# Patient Record
Sex: FEMALE | Race: WHITE | NOT HISPANIC OR LATINO | Employment: FULL TIME | URBAN - METROPOLITAN AREA
[De-identification: names, ages, dates, MRNs, and addresses within clinical notes are randomized per-mention and may not be internally consistent; named-entity substitution may affect disease eponyms.]

---

## 2018-02-14 ENCOUNTER — OFFICE VISIT (OUTPATIENT)
Dept: URGENT CARE | Facility: RETAIL CLINIC | Age: 48
End: 2018-02-14
Payer: COMMERCIAL

## 2018-02-14 VITALS
DIASTOLIC BLOOD PRESSURE: 86 MMHG | TEMPERATURE: 99.2 F | HEART RATE: 75 BPM | OXYGEN SATURATION: 100 % | SYSTOLIC BLOOD PRESSURE: 136 MMHG

## 2018-02-14 DIAGNOSIS — J01.90 ACUTE SINUSITIS WITH COEXISTING CONDITION REQUIRING PROPHYLACTIC TREATMENT: Primary | ICD-10-CM

## 2018-02-14 DIAGNOSIS — R05.9 COUGH: ICD-10-CM

## 2018-02-14 DIAGNOSIS — R09.81 NASAL SINUS CONGESTION: ICD-10-CM

## 2018-02-14 DIAGNOSIS — R43.8 BAD TASTE IN MOUTH: ICD-10-CM

## 2018-02-14 LAB
FLUAV AG UPPER RESP QL IA.RAPID: NORMAL
FLUBV AG UPPER RESP QL IA.RAPID: NORMAL

## 2018-02-14 PROCEDURE — 99203 OFFICE O/P NEW LOW 30 MIN: CPT | Performed by: PHYSICIAN ASSISTANT

## 2018-02-14 PROCEDURE — 87804 INFLUENZA ASSAY W/OPTIC: CPT | Mod: QW | Performed by: PHYSICIAN ASSISTANT

## 2018-02-14 RX ORDER — FLUTICASONE PROPIONATE 50 MCG
1-2 SPRAY, SUSPENSION (ML) NASAL DAILY
Qty: 1 BOTTLE | Refills: 0 | Status: ON HOLD | OUTPATIENT
Start: 2018-02-14 | End: 2019-10-22

## 2018-02-14 NOTE — LETTER
[address] [address]        2/14/2018    Zuleyma Amor was seen 2/14/2018 at [facility]. Please excuse Zuleyma from work yesterday and tomorrow due to illness. Zuleyma may return to work when no fever x 1 day and feeling better.      Cordially,        Fransisca Guevara, PAC

## 2018-02-14 NOTE — MR AVS SNAPSHOT
"              After Visit Summary   2018    Zuleyma Reed    MRN: [id number]           Patient Information     Date Of Birth          1970        Visit Information        Provider Department      2018 6:30 PM Fransisca Guevara PA-C [facility]        Today's Diagnoses     Cough    -  1    Nasal sinus congestion        Acute sinusitis with coexisting condition requiring prophylactic treatment           Follow-ups after your visit        Who to contact     You can reach your care team any time of the day by calling [phone].  Notification of test results:  If you have an abnormal lab result, we will notify you by phone as soon as possible.         Additional Information About Your Visit        MyChart Information     [employer] lets you send messages to your doctor, view your test results, renew your prescriptions, schedule appointments and more. To sign up, go to [email or web address] . Click on \"Log in\" on the left side of the screen, which will take you to the Welcome page. Then click on \"Sign up Now\" on the right side of the page.     You will be asked to enter the access code listed below, as well as some personal information. Please follow the directions to create your username and password.     Your access code is: [email or web address]  Expires: 5/15/2018  7:24 PM     Your access code will  in 90 days. If you need help or a new code, please call your [facility] clinic or [phone].        Care EveryWhere ID     This is your [facility] EveryWhere ID. This could be used by other organizations to access your [facility] medical records  [id number]        Your Vitals Were     Pulse Temperature Pulse Oximetry             75 99.2  F (37.3  C) (Oral) 100%          Blood Pressure from Last 3 Encounters:   18 136/86   06 122/70    Weight from Last 3 Encounters:   06 178 lb 1.6 oz (80.8 kg)              We Performed the Following     INFLUENZA A/B ANTIGEN        "   Today's Medication Changes          These changes are accurate as of 2/14/18  7:24 PM.  If you have any questions, ask your nurse or doctor.               Start taking these medicines.        Dose/Directions    amoxicillin-clavulanate 875-125 MG per tablet   Commonly known as:  AUGMENTIN   Used for:  Acute sinusitis with coexisting condition requiring prophylactic treatment        Dose:  1 tablet   Take 1 tablet by mouth 2 times daily for 10 days   Quantity:  20 tablet   Refills:  0       fluticasone 50 MCG/ACT spray   Commonly known as:  FLONASE   Used for:  Nasal sinus congestion        Dose:  1-2 spray   Spray 1-2 sprays into both nostrils daily   Quantity:  1 Bottle   Refills:  0            Where to get your medicines      These medications were sent to [address]  [address] [facility] [address]     Phone:  [phone]     amoxicillin-clavulanate 875-125 MG per tablet    fluticasone 50 MCG/ACT spray                Primary Care Provider Office Phone # Fax #    Speedy Quevedo [phone]       [facility] [address] [facility] [address]        Equal Access to Services     [facility]: Hadii mary jama hadasho Somichael, waaxda luqadaha, qaybta kaalmada glynn, seema kraft . So [facility] [phone].    ATENCIÓN: Si habla español, tiene a jeff disposición servicios gratuitos de asistencia lingüística. [facility] [phone].    We comply with applicable federal civil rights laws and [place] laws. We do not discriminate on the basis of race, color, national origin, age, disability, sex, sexual orientation, or gender identity.            Thank you!     Thank you for choosing [facility]  for your care. Our goal is always to provide you with excellent care. Hearing back from our patients is one way we can continue to improve our services. Please take a few minutes to complete the written survey that  you may receive in the mail after your visit with us. Thank you!             Your Updated Medication List - Protect others around you: Learn how to safely use, store and throw away your medicines at www.disposemymeds.org.          This list is accurate as of 2/14/18  7:24 PM.  Always use your most recent med list.                   Brand Name Dispense Instructions for use Diagnosis    amoxicillin-clavulanate 875-125 MG per tablet    AUGMENTIN    20 tablet    Take 1 tablet by mouth 2 times daily for 10 days    Acute sinusitis with coexisting condition requiring prophylactic treatment       fluticasone 50 MCG/ACT spray    FLONASE    1 Bottle    Spray 1-2 sprays into both nostrils daily    Nasal sinus congestion       SINUS & COLD-D PO

## 2018-02-15 NOTE — PROGRESS NOTES
Chief Complaint   Patient presents with     Sinus Problem     x2- 3 days     Fever     Headache     Dizziness         SUBJECTIVE:   Pt. presenting to [facility] -  with a chief complaint of funny taste in mouth. Sinus HA and pressure.  Some purulent nasal discharge.  Temp today - low grade. No cough.    See CC.  Hx of asthma no.  Onset of symptoms sudden  Course of illness is worsening.    Severity moderate  Current and Associated symptoms: fever, runny nose, stuffy nose, facial pain/pressure, headache, body aches and fatigue  Treatment measures tried include Tylenol/Ibuprofen and Decongestants.  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  Last antibiotic none > 6 years    Pregnancy Mirena IUD  Smoker no    Influenza A and B  -neg    ROS:  Energy level is <  ENT - denies ear pain, throat pain.    CP - Occ dry cough. No SOB or chest pain   GI- - appetite <. No nausea, vomiting or diarrhea.   No bowel or bladder changes   MSK - no joint pain or swelling   Skin: No rashes  No past medical history on file.  No past surgical history on file.  There is no problem list on file for this patient.    Current Outpatient Prescriptions   Medication     Pseudoephedrine-Naproxen Na (SINUS & COLD-D PO)     No current facility-administered medications for this visit.        OBJECTIVE:  /86  Pulse 75  Temp 99.2  F (37.3  C) (Oral)  SpO2 100%    GENERAL APPEARANCE: cooperative, alert and no distress. Appears well hydrated.  EYES: conjunctiva clear  HENT: Rt ear canal  clear and TM normal   Lt ear canal clear and TM normal   Nose marked congestion. thick discharge  Mouth without ulcers or lesions. no erythema. no exudate. Some PND  NECK: supple, few small shoddy NT ant nodes. No  posterior nodes.  RESP: lungs clear to auscultation - no rales, rhonchi or wheezes. Breathing easily.  CV: regular rates and rhythm  ABDOMEN:  soft, nontender, no HSM or masses and bowel sounds normal   SKIN: no suspicious lesions  or rashes  mod tenderness to palpate over tre max sinus areas.    Influenza A and B neg    ASSESSMENT:     Cough  Nasal sinus congestion  Acute sinusitis with coexisting condition requiring prophylactic treatment  Bad taste in mouth          PLAN:  Symptomatic measures   Prescriptions as below. Discussed indications, dosing, side affects and adverse reactions of medications with  Patient -Augmentin and Flonase.   Afrin x 3 days only  Eat yogurt daily or take a probiotic supplement when on antibiotics.  OTC cough suppressant/expectorant discussed  Salt water gargles - throat lozenges or honey/lemon tea if soothing and has a ST  saline nasal spray for  nasal congestion   Cool mist vaporizer   Stay in clean air environment.  > rest.  > fluids.  Contagiousness and hygiene discussed.  Fever and pain  control measures discussed.   If unable to swallow or any breathing difficulty to go to ED   See letter for work    Pt is comfortable with this plan.  Electronically signed,  ANTONIA Guevara, PAC

## 2018-02-15 NOTE — NURSING NOTE
"Chief Complaint   Patient presents with     Sinus Problem     x2- 3 days     Fever     Headache     Dizziness       Initial /86  Pulse 75  Temp 99.2  F (37.3  C) (Oral)  SpO2 100% Estimated body mass index is 26.3 kg/(m^2) as calculated from the following:    Height as of 5/17/06: 5' 9\" (1.753 m).    Weight as of 5/17/06: 178 lb 1.6 oz (80.8 kg).  Medication Reconciliation: complete   Zahida Dan      "

## 2018-03-08 ENCOUNTER — HOSPITAL ENCOUNTER (EMERGENCY)
Facility: CLINIC | Age: 48
Discharge: HOME | End: 2018-03-08
Attending: EMERGENCY MEDICINE | Admitting: EMERGENCY MEDICINE

## 2018-03-08 ENCOUNTER — APPOINTMENT (OUTPATIENT)
Dept: GENERAL RADIOLOGY | Facility: CLINIC | Age: 48
End: 2018-03-08
Attending: EMERGENCY MEDICINE

## 2018-03-08 VITALS
HEART RATE: 88 BPM | DIASTOLIC BLOOD PRESSURE: 80 MMHG | BODY MASS INDEX: 24.25 KG/M2 | RESPIRATION RATE: 20 BRPM | SYSTOLIC BLOOD PRESSURE: 158 MMHG | TEMPERATURE: 98.6 F | WEIGHT: 160 LBS | HEIGHT: 68 IN | OXYGEN SATURATION: 100 %

## 2018-03-08 DIAGNOSIS — S63.502A WRIST SPRAIN, LEFT, INITIAL ENCOUNTER: ICD-10-CM

## 2018-03-08 PROCEDURE — 73110 X-RAY EXAM OF WRIST: CPT | Mod: LT

## 2018-03-08 PROCEDURE — 99284 EMERGENCY DEPT VISIT MOD MDM: CPT

## 2018-03-08 PROCEDURE — 29125 APPL SHORT ARM SPLINT STATIC: CPT | Mod: LT

## 2018-03-08 NOTE — ED AVS SNAPSHOT
Emergency Department    [address] [facility] [address]    Phone:  [phone]    Fax:  [phone]                                       Zuleyma Reed   MRN: [id number]    Department:   Emergency Department   Date of Visit:  3/8/2018           Patient Information     Date Of Birth          1970        Your diagnoses for this visit were:     Wrist sprain, left, initial encounter        You were seen by Shar Verma MD.      Follow-up Information     Follow up with Shanti Alcantar MD In 1 week.    Specialty:  Orthopedics    Why:  recheck ed, If symptoms worsen    Contact information:    [facility]  [address] [id number] [phone]        Discharge References/Attachments     WRIST SPRAIN (ENGLISH)      24 Hour Appointment Hotline       To make an appointment at any [facility], call [phone] ([phone]). If you don't have a family doctor or clinic, we will help you find one. [place] clinics are conveniently located to serve the needs of you and your family.             Review of your medicines      Our records show that you are taking the medicines listed below. If these are incorrect, please call your family doctor or clinic.        Dose / Directions Last dose taken    fluticasone 50 MCG/ACT spray   Commonly known as:  FLONASE   Dose:  1-2 spray   Quantity:  1 Bottle        Spray 1-2 sprays into both nostrils daily   Refills:  0        SINUS & COLD-D PO        Refills:  0                Procedures and tests performed during your visit     Wrist XR, G/E 3 views, left      Orders Needing Specimen Collection     None      Pending Results     No orders found from 3/6/2018 to 3/9/2018.            Pending Culture Results     No orders found from 3/6/2018 to 3/9/2018.            Pending Results Instructions     If you had any lab results that were not finalized at the time of your Discharge, you can call the ED Lab Result RN at  [phone]. You will be contacted by this team for any positive Lab results or changes in treatment. The nurses are available 7 days a week from 10A to 6:30P.  You can leave a message 24 hours per day and they will return your call.        Test Results From Your Hospital Stay        3/8/2018  3:28 PM      Narrative     XR WRIST LEFT G/E 3 VIEWS 3/8/2018 3:18 PM    COMPARISON: None.    HISTORY: Injury.        Impression     IMPRESSION: No fractures are seen in the left wrist. Joints are  preserved and in normal alignment.    RANDI WONG MD                Clinical Quality Measure: Blood Pressure Screening     Your blood pressure was checked while you were in the emergency department today. The last reading we obtained was  BP: 158/80 . Please read the guidelines below about what these numbers mean and what you should do about them.  If your systolic blood pressure (the top number) is less than 120 and your diastolic blood pressure (the bottom number) is less than 80, then your blood pressure is normal. There is nothing more that you need to do about it.  If your systolic blood pressure (the top number) is 120-139 or your diastolic blood pressure (the bottom number) is 80-89, your blood pressure may be higher than it should be. You should have your blood pressure rechecked within a year by a primary care provider.  If your systolic blood pressure (the top number) is 140 or greater or your diastolic blood pressure (the bottom number) is 90 or greater, you may have high blood pressure. High blood pressure is treatable, but if left untreated over time it can put you at risk for heart attack, stroke, or kidney failure. You should have your blood pressure rechecked by a primary care provider within the next 4 weeks.  If your provider in the emergency department today gave you specific instructions to follow-up with your doctor or provider even sooner than that, you should follow that instruction and not wait for up to 4  "weeks for your follow-up visit.        Thank you for choosing [facility]       Thank you for choosing [facility] for your care. Our goal is always to provide you with excellent care. Hearing back from our patients is one way we can continue to improve our services. Please take a few minutes to complete the written survey that you may receive in the mail after you visit with us. Thank you!        [employer] Information     [employer] lets you send messages to your doctor, view your test results, renew your prescriptions, schedule appointments and more. To sign up, go to [email or web address] . Click on \"Log in\" on the left side of the screen, which will take you to the Welcome page. Then click on \"Sign up Now\" on the right side of the page.     You will be asked to enter the access code listed below, as well as some personal information. Please follow the directions to create your username and password.     Your access code is: [email or web address]  Expires: 5/15/2018  7:24 PM     Your access code will  in 90 days. If you need help or a new code, please call your [facility] clinic or [phone].        Care EveryWhere ID     This is your Care EveryWhere ID. This could be used by other organizations to access your [facility] medical records  [id number]        Equal Access to Services     STACEY MARTINEZ : Garrett Hickey, waaxda luqadaha, qaybta kaalmada aderuth annyaneil, seema alvarez. So [facility] [phone].    ATENCIÓN: Si habla español, tiene a jeff disposición servicios gratuitos de asistencia lingüística. Llame al [phone].    We comply with applicable federal civil rights laws and [place] laws. We do not discriminate on the basis of race, color, national origin, age, disability, sex, sexual orientation, or gender identity.            After Visit Summary       This is your record. Keep this with you and show to your community pharmacist(s) and doctor(s) at your next visit.                "

## 2018-03-08 NOTE — ED AVS SNAPSHOT
Emergency Department    [address] [place] [address]    Phone:  [phone]    Fax:  [phone]                                       Zuleyma Reed   MRN: [id number]    Department:   Emergency Department   Date of Visit:  3/8/2018           After Visit Summary Signature Page     I have received my discharge instructions, and my questions have been answered. I have discussed any challenges I see with this plan with the nurse or doctor.    ..........................................................................................................................................  Patient/Patient Representative Signature      ..........................................................................................................................................  Patient Representative Print Name and Relationship to Patient    ..................................................               ................................................  Date                                            Time    ..........................................................................................................................................  Reviewed by Signature/Title    ...................................................              ..............................................  Date                                                            Time
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
## 2018-05-11 ENCOUNTER — HOSPITAL ENCOUNTER (EMERGENCY)
Facility: CLINIC | Age: 48
Discharge: HOME | End: 2018-05-11
Attending: EMERGENCY MEDICINE | Admitting: EMERGENCY MEDICINE
Payer: COMMERCIAL

## 2018-05-11 ENCOUNTER — APPOINTMENT (OUTPATIENT)
Dept: GENERAL RADIOLOGY | Facility: CLINIC | Age: 48
End: 2018-05-11
Attending: EMERGENCY MEDICINE
Payer: COMMERCIAL

## 2018-05-11 VITALS
RESPIRATION RATE: 16 BRPM | TEMPERATURE: 98.6 F | OXYGEN SATURATION: 97 % | DIASTOLIC BLOOD PRESSURE: 99 MMHG | SYSTOLIC BLOOD PRESSURE: 153 MMHG | BODY MASS INDEX: 25.76 KG/M2 | WEIGHT: 170 LBS | HEIGHT: 68 IN

## 2018-05-11 DIAGNOSIS — R05.9 COUGH: ICD-10-CM

## 2018-05-11 DIAGNOSIS — M79.10 MYALGIA: ICD-10-CM

## 2018-05-11 DIAGNOSIS — J06.9 VIRAL UPPER RESPIRATORY TRACT INFECTION: ICD-10-CM

## 2018-05-11 DIAGNOSIS — R09.81 NASAL CONGESTION: ICD-10-CM

## 2018-05-11 DIAGNOSIS — J01.01 ACUTE RECURRENT MAXILLARY SINUSITIS: ICD-10-CM

## 2018-05-11 LAB
ANION GAP SERPL CALCULATED.3IONS-SCNC: 7 MMOL/L (ref 3–14)
BASOPHILS # BLD AUTO: 0.1 10E9/L (ref 0–0.2)
BASOPHILS NFR BLD AUTO: 1.8 %
BUN SERPL-MCNC: 7 MG/DL (ref 7–30)
CALCIUM SERPL-MCNC: 8.6 MG/DL (ref 8.5–10.1)
CHLORIDE SERPL-SCNC: 109 MMOL/L (ref 94–109)
CO2 SERPL-SCNC: 25 MMOL/L (ref 20–32)
CREAT SERPL-MCNC: 0.85 MG/DL (ref 0.52–1.04)
DIFFERENTIAL METHOD BLD: NORMAL
EOSINOPHIL # BLD AUTO: 0.2 10E9/L (ref 0–0.7)
EOSINOPHIL NFR BLD AUTO: 4 %
ERYTHROCYTE [DISTWIDTH] IN BLOOD BY AUTOMATED COUNT: 12.6 % (ref 10–15)
GFR SERPL CREATININE-BSD FRML MDRD: 72 ML/MIN/1.7M2
GLUCOSE SERPL-MCNC: 90 MG/DL (ref 70–99)
HCT VFR BLD AUTO: 42.9 % (ref 35–47)
HGB BLD-MCNC: 14.7 G/DL (ref 11.7–15.7)
IMM GRANULOCYTES # BLD: 0 10E9/L (ref 0–0.4)
IMM GRANULOCYTES NFR BLD: 0.4 %
LYMPHOCYTES # BLD AUTO: 2.2 10E9/L (ref 0.8–5.3)
LYMPHOCYTES NFR BLD AUTO: 38.8 %
MCH RBC QN AUTO: 30.7 PG (ref 26.5–33)
MCHC RBC AUTO-ENTMCNC: 34.3 G/DL (ref 31.5–36.5)
MCV RBC AUTO: 90 FL (ref 78–100)
MONOCYTES # BLD AUTO: 0.8 10E9/L (ref 0–1.3)
MONOCYTES NFR BLD AUTO: 15.2 %
NEUTROPHILS # BLD AUTO: 2.2 10E9/L (ref 1.6–8.3)
NEUTROPHILS NFR BLD AUTO: 39.8 %
NRBC # BLD AUTO: 0 10*3/UL
NRBC BLD AUTO-RTO: 0 /100
PLATELET # BLD AUTO: 213 10E9/L (ref 150–450)
POTASSIUM SERPL-SCNC: 3.7 MMOL/L (ref 3.4–5.3)
RBC # BLD AUTO: 4.79 10E12/L (ref 3.8–5.2)
SODIUM SERPL-SCNC: 141 MMOL/L (ref 133–144)
WBC # BLD AUTO: 5.5 10E9/L (ref 4–11)

## 2018-05-11 PROCEDURE — 25000125 ZZHC RX 250: Performed by: EMERGENCY MEDICINE

## 2018-05-11 PROCEDURE — 99284 EMERGENCY DEPT VISIT MOD MDM: CPT | Mod: 25

## 2018-05-11 PROCEDURE — 96361 HYDRATE IV INFUSION ADD-ON: CPT

## 2018-05-11 PROCEDURE — 96374 THER/PROPH/DIAG INJ IV PUSH: CPT

## 2018-05-11 PROCEDURE — 85025 COMPLETE CBC W/AUTO DIFF WBC: CPT | Performed by: EMERGENCY MEDICINE

## 2018-05-11 PROCEDURE — 71046 X-RAY EXAM CHEST 2 VIEWS: CPT

## 2018-05-11 PROCEDURE — 80048 BASIC METABOLIC PNL TOTAL CA: CPT | Performed by: EMERGENCY MEDICINE

## 2018-05-11 PROCEDURE — 25000128 H RX IP 250 OP 636: Performed by: EMERGENCY MEDICINE

## 2018-05-11 RX ORDER — OXYMETAZOLINE HYDROCHLORIDE 0.05 G/100ML
2 SPRAY NASAL ONCE
Status: COMPLETED | OUTPATIENT
Start: 2018-05-11 | End: 2018-05-11

## 2018-05-11 RX ORDER — SODIUM CHLORIDE 9 MG/ML
1000 INJECTION, SOLUTION INTRAVENOUS CONTINUOUS
Status: DISCONTINUED | OUTPATIENT
Start: 2018-05-11 | End: 2018-05-11 | Stop reason: HOSPADM

## 2018-05-11 RX ORDER — KETOROLAC TROMETHAMINE 15 MG/ML
15 INJECTION, SOLUTION INTRAMUSCULAR; INTRAVENOUS ONCE
Status: COMPLETED | OUTPATIENT
Start: 2018-05-11 | End: 2018-05-11

## 2018-05-11 RX ADMIN — SODIUM CHLORIDE 1000 ML: 9 INJECTION, SOLUTION INTRAVENOUS at 02:54

## 2018-05-11 RX ADMIN — KETOROLAC TROMETHAMINE 15 MG: 15 INJECTION, SOLUTION INTRAMUSCULAR; INTRAVENOUS at 02:57

## 2018-05-11 RX ADMIN — OXYMETAZOLINE HYDROCHLORIDE 2 SPRAY: 5 SPRAY NASAL at 02:59

## 2018-05-11 ASSESSMENT — ENCOUNTER SYMPTOMS
COUGH: 1
DIARRHEA: 0
SORE THROAT: 1
DYSURIA: 0
HEMATURIA: 0
FREQUENCY: 0
FEVER: 1
VOMITING: 0

## 2018-05-11 NOTE — ED NOTES
Bed: ED20  Expected date:   Expected time:   Means of arrival:   Comments:  Hold for a patient coming

## 2018-05-11 NOTE — DISCHARGE INSTRUCTIONS
Please return to the ED if you have worsening cough, fevers >101, chest pain, shortness of breath, intractable vomiting, or other acute changes.  Please follow up with your PCP in the next 2-3 days.      Use tylenol and ibuprofen for pain.  Drink plenty of fluids and rest.      Discharge Instructions  Upper Respiratory Infection    The upper respiratory tract includes the sinuses, nasal passages, pharynx, and larynx. A URI, or upper respiratory infection, is an infection of any of the parts of the upper airway. Symptoms include runny nose, congestion, sneezing, sore throat, cough, and fever. URIs are almost always caused by a virus. Antibiotics do not help with viral infections, so are generally not prescribed. A URI is very contagious through coughing and nasal secretions; make sure you wash your hands often and clean surfaces after sneezing, coughing or touching them. While you should start to improve in 3 - 5 days, remember that sometimes a cough can linger for several weeks.    Generally, every Emergency Department visit should have a follow-up clinic visit with either a primary or a specialty clinic/provider. Please follow-up as instructed by your emergency provider today.    Return to the Emergency Department if:    Any of your symptoms get much worse.    You seem very sick, like being too weak to get up.    You have chest pain or shortness of breath.     You have a severe headache.    You are vomiting (throwing up) so much you cannot keep fluids or medicines down.    You have confusion or seem unusually drowsy.    You have a seizure.    What can I do to help myself?    Fill any prescriptions the provider gave you and take them right away    If you have a fever, get plenty of rest and drink lots of fluids, especially water.    Using a humidifier or saline nose spray will also help loosen mucous.     Clothes or blankets will not change your fever. Do what is comfortable for you.    Bathing or sponging in  lukewarm water may help you feel better.    Acetaminophen (Tylenol ) or ibuprofen (Advil , Motrin ) will help bring fever down and may help you feel more comfortable. Be sure to read and follow the package directions, and ask your provider if you have questions.    Do not drink alcohol.    Decongestants may help you feel better. You may use decongestant nose sprays Afrin  (oxymetazoline) or Waqar-Synephrine  (phenylephrine hydrochloride) for up to 3 days, or may use a decongestant tablet like Sudafed  (pseudoephedrine).  If you were given a prescription for medicine here today, be sure to read all of the information (including the package insert) that comes with your prescription.  This will include important information about the medicine, its side effects, and any warnings that you need to know about.  The pharmacist who fills the prescription can provide more information and answer questions you may have about the medicine.  If you have questions or concerns that the pharmacist cannot address, please call or return to the Emergency Department.   Remember that you can always come back to the Emergency Department if you are not able to see your regular provider in the amount of time listed above, if you get any new symptoms, or if there is anything that worries you.

## 2018-05-11 NOTE — LETTER
May 11, 2018      To Whom It May Concern:      Zuleyma Reed was seen in our Emergency Department today, 05/11/18.  I expect her condition to improve over the next 3 days.  She may return to work/school when improved.    Sincerely,        Julio HOWARD RN

## 2018-05-11 NOTE — ED PROVIDER NOTES
"  History     Chief Complaint:  Cough    HPI   Zuleyma Reed is a 47 year old female who presents to the ED for evaluation of a cough. The patient reports that she has had a wet cough with congestion and a sore throat for the last 4 days. Her symptoms have progressively worsened, so she presented to the ED tonight for evaluation. She additionally notes having some associated fevers and feeling dehydrated. She has some chest \"burning\" with her cough. She denies any vomiting, diarrhea, ill contacts, or urinary symptoms. She has been taking Tylenol Cold & Flu for her symptoms, though has not used Afrin.    Allergies:  NKDA    Medications:    Flonase    Past Medical History:    No past pertinent family history.    Past Surgical History:    The patient does not have any pertinent past surgical history.    Family History:    No past pertinent family history.    Social History:  Marital Status:  Single [1]  Former smoker  Alcohol use: yes    Review of Systems   Constitutional: Positive for fever.   HENT: Positive for congestion and sore throat.    Respiratory: Positive for cough.    Gastrointestinal: Negative for diarrhea and vomiting.   Genitourinary: Negative for dysuria, frequency and hematuria.   All other systems reviewed and are negative.    Physical Exam     Patient Vitals for the past 24 hrs:   BP Temp Temp src Heart Rate Resp SpO2 Height Weight   05/11/18 0300 - - - - - 97 % - -   05/11/18 0245 - - - - - 97 % - -   05/11/18 0215 (!) 153/99 98.6  F (37  C) Oral 78 16 98 % 1.727 m (5' 8\") 77.1 kg (170 lb)     Physical Exam   General: Resting on the bed.  Ears, Nose, Throat:  External ears normal.  Nose normal.  No pharyngeal erythema, swelling or exudate.  Midline uvula.  no trismus.    Eyes:  Conjunctivae clear.  Pupils are equal, round, and reactive.   Neck: Normal range of motion.  Neck supple. minimal right submandibular LAD.    CV: Regular rate and rhythm.  No murmurs.      Respiratory: Effort normal and " breath sounds normal.  No wheezing or crackles.   Gastrointestinal: Soft.  No distension. There is no tenderness.    Neuro: Alert. Moving all extremities appropriately.  Normal speech.    Skin: Skin is warm and dry.  No rash noted.   Emergency Department Course     Imaging:  Radiographic findings were communicated with the patient who voiced understanding of the findings.  XR Chest 2 views:   No acute abnormality, as per radiology.     Laboratory:  CBC: WBC: 5.5, HGB: 14.7, PLT: 213  BMP: All WNL (Creatinine: 0.85)    Interventions:  0254 NS 1L IV  0257 Toradol, 15 mg, IV injection  0259 Afrin 2 sprays Nasal  Please see MAR for full list of medications administered in the ED.    Emergency Department Course:  Nursing notes and vitals reviewed. (0240) I performed an exam of the patient as documented above.     IV inserted. Medicine administered as documented above. Blood drawn. This was sent to the lab for further testing, results above.    The patient was sent for a Chest XR while in the emergency department, findings above.     (0340) I rechecked the patient and discussed the results of her workup thus far.     Findings and plan explained to the Patient. Patient discharged home with instructions regarding supportive care, medications, and reasons to return. The importance of close follow-up was reviewed. The patient was prescribed Augmentin.    I personally reviewed the laboratory results with the Patient and answered all related questions prior to discharge.     Impression & Plan      Medical Decision Making:  Zuleyma Reed is a 47 year old female, otherwise healthy, who presents with a cough, congestion, and generalized malaise. Vitals are unremarkable. Broad differential pursued including, but not limited to, URI, pneumonia, sinus infection, electrolyte or metabolic abnormality, etc. Overall she is well appearing and non toxic. She denies any urinary symptoms to suggest UTI. She has a benign abdominal exam. I  am not concerned for any acute surgical process. CBC shows no leukocytosis or anemia. BMP shows no metabolic, electrolyte, or renal abnormality. Chest xray was obtained and there is no evidence for any pneumonia, pneumothorax, or effusion. She is otherwise non toxic. Not concerning for meningitis or encephalitis. He was given Toradol and IV fluid with improvement in symptoms. I suggest she likely has a viral URI.  On reassessment the patient she feels improved but reports that she has had this on and off nasal congestion for 2-3 weeks.  She reports that she has not had increased drainage that has been colored.  She had low-grade temperatures at home.  Given the persistence of symptoms greater than 10-14 days opted to treat with Augmentin.  She reports this feels like her sinus infection.  Her sinus infections previously have responded well to Augmentin. I advised supportive cares including Afrin, saline, tylenol, and ibuprofen. I advised good fluid hydration. She was discharged in stable but improved condition. I advised follow up with her PCP and to return with any worsening symptoms.     Diagnosis:    ICD-10-CM   1. Nasal congestion R09.81   2. Cough R05   3. Myalgia M79.1   4. Viral upper respiratory tract infection J06.9    B97.89   5. Acute recurrent maxillary sinusitis J01.01     Disposition:  discharged to home    Discharge Medications:  New Prescriptions    AMOXICILLIN-CLAVULANATE (AUGMENTIN) 875-125 MG PER TABLET    Take 1 tablet by mouth 2 times daily for 7 days     Scribe Disclosure:  Chandni CARDONA, am serving as a scribe on 5/11/2018 at 2:20 AM to personally document services performed by Chandni Chong MD based on my observations and the provider's statements to me.     Chandni Leon  5/11/2018    EMERGENCY DEPARTMENT       Chandni Chong MD  05/11/18 0454

## 2018-05-11 NOTE — ED AVS SNAPSHOT
Emergency Department    [address] [place] [address]    Phone:  [phone]    Fax:  [phone]                                       Zuleyma Reed   MRN: [id number]    Department:   Emergency Department   Date of Visit:  5/11/2018           After Visit Summary Signature Page     I have received my discharge instructions, and my questions have been answered. I have discussed any challenges I see with this plan with the nurse or doctor.    ..........................................................................................................................................  Patient/Patient Representative Signature      ..........................................................................................................................................  Patient Representative Print Name and Relationship to Patient    ..................................................               ................................................  Date                                            Time    ..........................................................................................................................................  Reviewed by Signature/Title    ...................................................              ..............................................  Date                                                            Time

## 2018-05-11 NOTE — ED AVS SNAPSHOT
Emergency Department    [address] [facility] [address]    Phone:  [phone]    Fax:  [phone]                                       Zuleyma Reed   MRN: [id number]    Department:   Emergency Department   Date of Visit:  5/11/2018           Patient Information     Date Of Birth          1970        Your diagnoses for this visit were:     Nasal congestion     Cough     Myalgia     Viral upper respiratory tract infection     Acute recurrent maxillary sinusitis        You were seen by Chandni Chong MD.      Follow-up Information     Follow up with Jyotsna Ellis CNM. Schedule an appointment as soon as possible for a visit in 2 days.    Contact information:    [facility]  [address] [facility] [address]  [phone]          Discharge Instructions       Please return to the ED if you have worsening cough, fevers >101, chest pain, shortness of breath, intractable vomiting, or other acute changes.  Please follow up with your PCP in the next 2-3 days.      Use tylenol and ibuprofen for pain.  Drink plenty of fluids and rest.      Discharge Instructions  Upper Respiratory Infection    The upper respiratory tract includes the sinuses, nasal passages, pharynx, and larynx. A URI, or upper respiratory infection, is an infection of any of the parts of the upper airway. Symptoms include runny nose, congestion, sneezing, sore throat, cough, and fever. URIs are almost always caused by a virus. Antibiotics do not help with viral infections, so are generally not prescribed. A URI is very contagious through coughing and nasal secretions; make sure you wash your hands often and clean surfaces after sneezing, coughing or touching them. While you should start to improve in 3 - 5 days, remember that sometimes a cough can linger for several weeks.    Generally, every Emergency Department visit should have a follow-up clinic visit with either a primary or a specialty  clinic/provider. Please follow-up as instructed by your emergency provider today.    Return to the Emergency Department if:    Any of your symptoms get much worse.    You seem very sick, like being too weak to get up.    You have chest pain or shortness of breath.     You have a severe headache.    You are vomiting (throwing up) so much you cannot keep fluids or medicines down.    You have confusion or seem unusually drowsy.    You have a seizure.    What can I do to help myself?    Fill any prescriptions the provider gave you and take them right away    If you have a fever, get plenty of rest and drink lots of fluids, especially water.    Using a humidifier or saline nose spray will also help loosen mucous.     Clothes or blankets will not change your fever. Do what is comfortable for you.    Bathing or sponging in lukewarm water may help you feel better.    Acetaminophen (Tylenol ) or ibuprofen (Advil , Motrin ) will help bring fever down and may help you feel more comfortable. Be sure to read and follow the package directions, and ask your provider if you have questions.    Do not drink alcohol.    Decongestants may help you feel better. You may use decongestant nose sprays Afrin  (oxymetazoline) or Waqar-Synephrine  (phenylephrine hydrochloride) for up to 3 days, or may use a decongestant tablet like Sudafed  (pseudoephedrine).  If you were given a prescription for medicine here today, be sure to read all of the information (including the package insert) that comes with your prescription.  This will include important information about the medicine, its side effects, and any warnings that you need to know about.  The pharmacist who fills the prescription can provide more information and answer questions you may have about the medicine.  If you have questions or concerns that the pharmacist cannot address, please call or return to the Emergency Department.   Remember that you can always come back to the Emergency  Department if you are not able to see your regular provider in the amount of time listed above, if you get any new symptoms, or if there is anything that worries you.      24 Hour Appointment Hotline       To make an appointment at any [facility], call [phone] ([phone]). If you don't have a family doctor or clinic, we will help you find one. [facility] are conveniently located to serve the needs of you and your family.             Review of your medicines      START taking        Dose / Directions Last dose taken    amoxicillin-clavulanate 875-125 MG per tablet   Commonly known as:  AUGMENTIN   Dose:  1 tablet   Quantity:  14 tablet        Take 1 tablet by mouth 2 times daily for 7 days   Refills:  0          Our records show that you are taking the medicines listed below. If these are incorrect, please call your family doctor or clinic.        Dose / Directions Last dose taken    fluticasone 50 MCG/ACT spray   Commonly known as:  FLONASE   Dose:  1-2 spray   Quantity:  1 Bottle        Spray 1-2 sprays into both nostrils daily   Refills:  0        SINUS & COLD-D PO        Refills:  0                Prescriptions were sent or printed at these locations (1 Prescription)                   Other Prescriptions                Printed at Department/Unit printer (1 of 1)         amoxicillin-clavulanate (AUGMENTIN) 875-125 MG per tablet                Procedures and tests performed during your visit     Basic metabolic panel    CBC with platelets differential    XR Chest 2 Views      Orders Needing Specimen Collection     None      Pending Results     No orders found from 5/9/2018 to 5/12/2018.            Pending Culture Results     No orders found from 5/9/2018 to 5/12/2018.            Pending Results Instructions     If you had any lab results that were not finalized at the time of your Discharge, you can call the ED Lab Result RN at [phone]. You will be contacted by this team for any positive  Lab results or changes in treatment. The nurses are available 7 days a week from 10A to 6:30P.  You can leave a message 24 hours per day and they will return your call.        Test Results From Your Hospital Stay        5/11/2018  3:02 AM      Component Results     Component Value Ref Range & Units Status    WBC 5.5 4.0 - 11.0 10e9/L Final    RBC Count 4.79 3.8 - 5.2 10e12/L Final    Hemoglobin 14.7 11.7 - 15.7 g/dL Final    Hematocrit 42.9 35.0 - 47.0 % Final    MCV 90 78 - 100 fl Final    MCH 30.7 26.5 - 33.0 pg Final    MCHC 34.3 31.5 - 36.5 g/dL Final    RDW 12.6 10.0 - 15.0 % Final    Platelet Count 213 150 - 450 10e9/L Final    Diff Method Automated Method  Final    % Neutrophils 39.8 % Final    % Lymphocytes 38.8 % Final    % Monocytes 15.2 % Final    % Eosinophils 4.0 % Final    % Basophils 1.8 % Final    % Immature Granulocytes 0.4 % Final    Nucleated RBCs 0 0 /100 Final    Absolute Neutrophil 2.2 1.6 - 8.3 10e9/L Final    Absolute Lymphocytes 2.2 0.8 - 5.3 10e9/L Final    Absolute Monocytes 0.8 0.0 - 1.3 10e9/L Final    Absolute Eosinophils 0.2 0.0 - 0.7 10e9/L Final    Absolute Basophils 0.1 0.0 - 0.2 10e9/L Final    Abs Immature Granulocytes 0.0 0 - 0.4 10e9/L Final    Absolute Nucleated RBC 0.0  Final         5/11/2018  3:17 AM      Component Results     Component Value Ref Range & Units Status    Sodium 141 133 - 144 mmol/L Final    Potassium 3.7 3.4 - 5.3 mmol/L Final    Chloride 109 94 - 109 mmol/L Final    Carbon Dioxide 25 20 - 32 mmol/L Final    Anion Gap 7 3 - 14 mmol/L Final    Glucose 90 70 - 99 mg/dL Final    Urea Nitrogen 7 7 - 30 mg/dL Final    Creatinine 0.85 0.52 - 1.04 mg/dL Final    GFR Estimate 72 >60 mL/min/1.7m2 Final    Non  GFR Calc    GFR Estimate If Black 87 >60 mL/min/1.7m2 Final    African American GFR Calc    Calcium 8.6 8.5 - 10.1 mg/dL Final         5/11/2018  3:16 AM      Narrative     XR CHEST 2 VW   5/11/2018 3:13 AM     HISTORY: Cough.    COMPARISON:  5/5/2006.    FINDINGS: The heart size is normal. The lungs are clear. No  pneumothorax or pleural effusion.        Impression     IMPRESSION: No acute abnormality.    DANA IBRAHIM MD                Clinical Quality Measure: Blood Pressure Screening     Your blood pressure was checked while you were in the emergency department today. The last reading we obtained was  BP: (!) 153/99 . Please read the guidelines below about what these numbers mean and what you should do about them.  If your systolic blood pressure (the top number) is less than 120 and your diastolic blood pressure (the bottom number) is less than 80, then your blood pressure is normal. There is nothing more that you need to do about it.  If your systolic blood pressure (the top number) is 120-139 or your diastolic blood pressure (the bottom number) is 80-89, your blood pressure may be higher than it should be. You should have your blood pressure rechecked within a year by a primary care provider.  If your systolic blood pressure (the top number) is 140 or greater or your diastolic blood pressure (the bottom number) is 90 or greater, you may have high blood pressure. High blood pressure is treatable, but if left untreated over time it can put you at risk for heart attack, stroke, or kidney failure. You should have your blood pressure rechecked by a primary care provider within the next 4 weeks.  If your provider in the emergency department today gave you specific instructions to follow-up with your doctor or provider even sooner than that, you should follow that instruction and not wait for up to 4 weeks for your follow-up visit.        Thank you for choosing [facility]       Thank you for choosing [facility] for your care. Our goal is always to provide you with excellent care. Hearing back from our patients is one way we can continue to improve our services. Please take a few minutes to complete the written survey that you may receive in the mail after  "you visit with us. Thank you!        [employer] Information     [employer] lets you send messages to your doctor, view your test results, renew your prescriptions, schedule appointments and more. To sign up, go to [email or web address] . Click on \"Log in\" on the left side of the screen, which will take you to the Welcome page. Then click on \"Sign up Now\" on the right side of the page.     You will be asked to enter the access code listed below, as well as some personal information. Please follow the directions to create your username and password.     Your access code is: [email or web address]  Expires: 5/15/2018  8:24 PM     Your access code will  in 90 days. If you need help or a new code, please call your [facility] clinic or [phone].        Care EveryWhere ID     This is your Care EveryWhere ID. This could be used by other organizations to access your [facility] medical records  [id number]        Equal Access to Services     [facility] : Hadii mary jama hadasho Solyndsayali, waaxda luqadaha, qaybta kaalmada adeegyaneil, seema kraft . So [facility] [phone].    ATENCIÓN: Si habla español, tiene a jeff disposición servicios gratuitos de asistencia lingüística. Llame al [phone].    We comply with applicable federal civil rights laws and [place] laws. We do not discriminate on the basis of race, color, national origin, age, disability, sex, sexual orientation, or gender identity.            After Visit Summary       This is your record. Keep this with you and show to your community pharmacist(s) and doctor(s) at your next visit.                  "

## 2019-01-08 ENCOUNTER — VIRTUAL VISIT (OUTPATIENT)
Dept: FAMILY MEDICINE | Facility: OTHER | Age: 49
End: 2019-01-08

## 2019-01-08 NOTE — PROGRESS NOTES
"Date:   Clinician: Yoseph Zheng  Clinician NPI: [id number]  Patient: Zuleyma Reed  Patient : 1970  Patient Address: [address] [place] [address], [place] [address]  Patient Phone: [phone]  Visit Protocol: URI  Patient Summary:  Zuleyma is a 48 year old ( : 1970 ) female who initiated a Visit for cold, sinus infection, or influenza. When asked the question \"Please sign me up to receive news, health information and promotions from [employer].\", uZleyma responded \"No\".    Zuleyma states her symptoms started gradually 2-3 weeks ago. After her symptoms started, they improved and then got worse again.   Her symptoms consist of myalgia, a headache, malaise, facial pain or pressure, rhinitis, and nasal congestion. She is experiencing difficulty breathing due to nasal congestion but she is not short of breath.   Symptom details     Nasal secretions: The color of her mucus is yellow.    Facial pain or pressure: The facial pain or pressure does not feel worse when bending or leaning forward.     Headache: She states the headache is moderate (4-6 on a 10 point pain scale).      Zuleyma denies having enlarged lymph nodes, sore throat, ear pain, fever, cough, chills, wheezing, and teeth pain. She also denies taking antibiotic medication for the symptoms and having recent facial or sinus surgery in the past 60 days.   She has not recently been exposed to someone with influenza. Zuleyma has not been in close contact with any high risk individuals.   Zuleyma had 2 sinus infections within the past year.   Weight: 175 lbs   Zuleyma does not smoke or use smokeless tobacco.   She denies pregnancy and denies breastfeeding. She has menstruated in the past month.   Additional information as reported by the patient (free text): augmentin is the one drug that helps with my Sinus infections.   MEDICATIONS: Sudafed PE Pressure+Pain oral, vitamin A-vitamin D3-vitamin E-vitamin K oral, ALLERGIES: NKDA  Clinician Response:  Deaamber Amor,  Based on " the information provided, you have acute bacterial sinusitis, also known as a sinus infection. Sinus infections are caused by bacteria or a virus and symptoms are almost always identical. The difference between the 2 types of infections is timing.  Sinus infections start as viral infections and symptoms improve on their own in about 7 days. If symptoms have not improved after 7 days or have even worsened, a bacterial infection may have developed.  Medication information  I am prescribing:     Amoxicillin-pot clavulanate 875-125 mg oral tablet. Take 1 tablet by mouth every 12 hours for 10 days. There are no refills with this prescription.   Antibiotics can cause an allergic reaction even if you have taken them without a problem in the past. If you develop a new rash, swelling, or difficulty breathing, stop the medication and be seen in a clinic or urgent care immediately.  A yeast infection is a side effect of taking antibiotics in some women. Please use [facility] to get treatment if you have symptoms of a yeast infection.  If you become pregnant during this course of treatment, stop taking the medication and contact your primary care provider.  Unless you are allergic to the over-the-counter medication(s) below, I recommend using:   A sinus irrigation kit such as Sinus Rinse, Neti Pot, SinuCleanse, or store brand. Be sure to use sterile or previously boiled water to prevent unwanted infections.   Over-the-counter medications do not require a prescription. Ask the pharmacist if you have any questions.  Self care  The following tips will keep you as comfortable as possible while you recover:     Rest    Drink plenty of water and other liquids    Take a hot shower to loosen congestion     When to seek care  Please be seen in a clinic or urgent care if any of the following occur:     Symptoms do not start to improve after 3 days of treatment    New symptoms develop, or symptoms become worse      Diagnosis: Acute bacterial  sinusitis  Diagnosis ICD: J01.90  Prescription: amoxicillin-pot clavulanate 875-125 mg oral tablet 20 tablet, 10 days supply. Take 1 tablet by mouth every 12 hours for 10 days. Refills: 0, Refill as needed: no, Allow substitutions: yes  Pharmacy: [facility]/pharmacy #[id number] - [phone] - [address] [facility], [place] [address]

## 2019-02-02 ENCOUNTER — OFFICE VISIT (OUTPATIENT)
Dept: URGENT CARE | Facility: URGENT CARE | Age: 49
End: 2019-02-02
Payer: COMMERCIAL

## 2019-02-02 VITALS
RESPIRATION RATE: 14 BRPM | HEART RATE: 69 BPM | SYSTOLIC BLOOD PRESSURE: 136 MMHG | BODY MASS INDEX: 27.58 KG/M2 | WEIGHT: 182 LBS | OXYGEN SATURATION: 98 % | HEIGHT: 68 IN | TEMPERATURE: 98.6 F | DIASTOLIC BLOOD PRESSURE: 80 MMHG

## 2019-02-02 DIAGNOSIS — K04.7 TOOTH INFECTION: Primary | ICD-10-CM

## 2019-02-02 PROCEDURE — 99214 OFFICE O/P EST MOD 30 MIN: CPT | Mod: 25 | Performed by: PHYSICIAN ASSISTANT

## 2019-02-02 PROCEDURE — 96372 THER/PROPH/DIAG INJ SC/IM: CPT | Performed by: PHYSICIAN ASSISTANT

## 2019-02-02 RX ORDER — KETOROLAC TROMETHAMINE 30 MG/ML
60 INJECTION, SOLUTION INTRAMUSCULAR; INTRAVENOUS ONCE
Status: COMPLETED
Start: 2019-02-02 | End: 2019-02-02

## 2019-02-02 RX ORDER — KETOROLAC TROMETHAMINE 10 MG/1
10 TABLET, FILM COATED ORAL EVERY 6 HOURS PRN
Qty: 20 TABLET | Refills: 0 | Status: SHIPPED | OUTPATIENT
Start: 2019-02-02 | End: 2019-04-11

## 2019-02-02 RX ADMIN — KETOROLAC TROMETHAMINE 60 MG: 30 INJECTION, SOLUTION INTRAMUSCULAR; INTRAVENOUS at 13:04

## 2019-02-02 ASSESSMENT — PAIN SCALES - GENERAL: PAINLEVEL: SEVERE PAIN (7)

## 2019-02-02 ASSESSMENT — MIFFLIN-ST. JEOR: SCORE: 1514.05

## 2019-02-02 NOTE — NURSING NOTE
"Chief Complaint   Patient presents with     Mouth Problem     Root canal 1/30/19 followed by fever of 103 on thursday. she is currently on amoxicillin and vicodin for pain. Swelling in mouth, cheeks, lips, and nose. Pain radiating to sinuses. Ice packs have helped with pain and swelling, she is able to eat ice cream.        Initial /80 (BP Location: Right arm, Patient Position: Chair, Cuff Size: Adult Regular)   Pulse 69   Temp 98.6  F (37  C) (Tympanic)   Resp 14   Ht 1.727 m (5' 8\")   Wt 82.6 kg (182 lb)   SpO2 98%   BMI 27.67 kg/m   Estimated body mass index is 27.67 kg/m  as calculated from the following:    Height as of this encounter: 1.727 m (5' 8\").    Weight as of this encounter: 82.6 kg (182 lb).    Medication Reconciliation: complete  Joy Bo MA    "

## 2019-02-02 NOTE — PROGRESS NOTES
SUBJECTIVE:   Zuleyma Reed is a 48 year old female presenting with a chief complaint of   Chief Complaint   Patient presents with     Mouth Problem     Root canal 1/30/19 followed by fever of 103 on thursday. she is currently on amoxicillin and vicodin for pain. Swelling in mouth, cheeks, lips, and nose. Pain radiating to sinuses. Ice packs have helped with pain and swelling, she is able to eat ice cream.        She is an established patient of [facility].    Dental pain    Onset of symptoms was 4 day(s) ago.  Course of illness is worsening.    Severity moderate  Current and Associated symptoms: dental pain and sinus pain  Treatment measures tried include Tylenol/Ibuprofen.  Predisposing factors include recently had root canal but now has worsening swelling and pain.      Review of Systems   Constitutional: Negative for chills, fatigue, fever and unexpected weight change.   HENT: Positive for dental problem. Negative for congestion, ear pain, postnasal drip, rhinorrhea, sinus pressure, sore throat and trouble swallowing.    Eyes: Negative for pain, redness and visual disturbance.   Respiratory: Negative for cough, chest tightness, shortness of breath and wheezing.    Cardiovascular: Negative for chest pain and palpitations.   Gastrointestinal: Negative for abdominal pain, diarrhea, nausea and vomiting.   Musculoskeletal: Negative for arthralgias, back pain and myalgias.   Skin: Negative for rash.       History reviewed. No pertinent past medical history.  History reviewed. No pertinent family history.  Current Outpatient Medications   Medication Sig Dispense Refill     amoxicillin-clavulanate (AUGMENTIN) 875-125 MG tablet Take 1 tablet by mouth 2 times daily for 10 days 20 tablet 0     amoxicillin-clavulanate (AUGMENTIN) 875-125 MG tablet Take 1 tablet by mouth 2 times daily 20 tablet 0     ketorolac (TORADOL) 10 MG tablet Take 1 tablet (10 mg) by mouth every 6 hours as needed for moderate pain 20 tablet 0      "fluticasone (FLONASE) 50 MCG/ACT spray Spray 1-2 sprays into both nostrils daily 1 Bottle 0     Pseudoephedrine-Naproxen Na (SINUS & COLD-D PO)        Social History     Tobacco Use     Smoking status: Former Smoker     Smokeless tobacco: Never Used   Substance Use Topics     Alcohol use: Yes       OBJECTIVE  /80 (BP Location: Right arm, Patient Position: Chair, Cuff Size: Adult Regular)   Pulse 69   Temp 98.6  F (37  C) (Tympanic)   Resp 14   Ht 1.727 m (5' 8\")   Wt 82.6 kg (182 lb)   SpO2 98%   BMI 27.67 kg/m      Physical Exam   Constitutional: She appears well-developed and well-nourished. No distress.   HENT:   Head: Normocephalic.   Right Ear: Tympanic membrane and ear canal normal.   Left Ear: Tympanic membrane and ear canal normal.   Mouth/Throat: Oropharynx is clear and moist.       Redness and swelling at gumline of left front tooth. No drainage/discharge    Eyes: Conjunctivae are normal. Pupils are equal, round, and reactive to light.   Cardiovascular: Normal rate and normal heart sounds.   Pulmonary/Chest: Effort normal and breath sounds normal.   Skin: Skin is warm and dry. No rash noted.   Psychiatric: She has a normal mood and affect. Her behavior is normal.       Labs:  No results found for this or any previous visit (from the past 24 hour(s)).    X-Ray was not done.    ASSESSMENT:      ICD-10-CM    1. Tooth infection K04.7 amoxicillin-clavulanate (AUGMENTIN) 875-125 MG tablet     ketorolac (TORADOL) injection 60 mg     amoxicillin-clavulanate (AUGMENTIN) 875-125 MG tablet     ketorolac (TORADOL) 10 MG tablet        Medical Decision Making:    Differential Diagnosis:  Dental abscess    Serious Comorbid Conditions:  Adult:  None    PLAN:    Will switch to Augmentin two times daily x 10 day. Gave torodol 60mg IM for pain relief. She has Vicodin at home from dentist. Follow up with dentist this week.     Followup:    If not improving or if condition worsens, follow up with your Primary Care " Provider    Patient Instructions

## 2019-02-06 ASSESSMENT — ENCOUNTER SYMPTOMS
RHINORRHEA: 0
ABDOMINAL PAIN: 0
UNEXPECTED WEIGHT CHANGE: 0
BACK PAIN: 0
SHORTNESS OF BREATH: 0
VOMITING: 0
TROUBLE SWALLOWING: 0
WHEEZING: 0
MYALGIAS: 0
DIARRHEA: 0
FATIGUE: 0
EYE REDNESS: 0
CHEST TIGHTNESS: 0
PALPITATIONS: 0
ARTHRALGIAS: 0
SINUS PRESSURE: 0
NAUSEA: 0
EYE PAIN: 0
COUGH: 0
CHILLS: 0
FEVER: 0
SORE THROAT: 0

## 2019-04-11 ENCOUNTER — OFFICE VISIT (OUTPATIENT)
Dept: FAMILY MEDICINE | Facility: CLINIC | Age: 49
End: 2019-04-11
Payer: COMMERCIAL

## 2019-04-11 VITALS
DIASTOLIC BLOOD PRESSURE: 92 MMHG | WEIGHT: 186 LBS | RESPIRATION RATE: 16 BRPM | SYSTOLIC BLOOD PRESSURE: 133 MMHG | HEART RATE: 94 BPM | TEMPERATURE: 99.1 F | BODY MASS INDEX: 28.28 KG/M2

## 2019-04-11 DIAGNOSIS — M25.552 HIP PAIN, LEFT: ICD-10-CM

## 2019-04-11 DIAGNOSIS — M25.532 PAIN IN BOTH WRISTS: ICD-10-CM

## 2019-04-11 DIAGNOSIS — M25.531 PAIN IN BOTH WRISTS: ICD-10-CM

## 2019-04-11 DIAGNOSIS — M54.50 BILATERAL LOW BACK PAIN WITHOUT SCIATICA, UNSPECIFIED CHRONICITY: Primary | ICD-10-CM

## 2019-04-11 PROCEDURE — 99214 OFFICE O/P EST MOD 30 MIN: CPT | Performed by: FAMILY MEDICINE

## 2019-04-11 RX ORDER — CYCLOBENZAPRINE HCL 5 MG
TABLET ORAL
Qty: 90 TABLET | Refills: 0 | Status: ON HOLD | OUTPATIENT
Start: 2019-04-11 | End: 2019-10-22

## 2019-04-11 ASSESSMENT — PAIN SCALES - GENERAL: PAINLEVEL: SEVERE PAIN (6)

## 2019-04-11 NOTE — PATIENT INSTRUCTIONS
Acute Injury Clinic  [facility] in [place] now offers an Acute Injury Clinic for orthopedic injuries such as broken bones, strains, sprains or tears. You can walk right in! Providers who specialize in sports medicine will see you without an appointment.  Our Acute Injury Clinic gets you to the right expert, right off the bat. Unless your injury is life-threatening, you can come straight to the clinic instead of going to the emergency room and having to make a follow-up appointment with an orthopedic specialist. We have imaging, pharmacy and physical therapy services on site, too.   Acute injury care hours:  Monday-Thursday, 8 a.m. - 8 p.m.  Friday, 8 a.m. - 5 p.m.  Saturday, 8 a.m.- 2 p.m.  [address] [place] [address]  Conditions we treat  Sports concussions   Fractures   Shoulder, elbow, wrist, knee or foot injuries   Muscle strain   Tears or sprains to ligaments and tendons   Running injuries    Or Check your insurance [facility]  About [facility] [place] is located just off [address] [place] [address] near [facility].  [facility] [place] is your first stop after sustaining an acute injury. Our Orthopedic Urgent Care is available to patients from 8 AM - 8 PM daily. From sports injuries to accidents, the specialists at our OU will diagnose and treat your injury on the spot.  Aside from Orthopedic Urgent Care, the clinic in [place] offers programming tailored to various orthopedic specialties, including physical therapy, hand therapy and a sports performance space. [facility] also is home to the [place] [facility], as well as on-site care suites that cater to [facility] Recovery program, specifically total joint replacement.

## 2019-04-11 NOTE — PROGRESS NOTES
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  And since then has been having pain in her lower back  Both wrists also bothering her she thinks from the position of the keyboard  No numbness or weakness     She describes that the work desk doesn't seem to be ergonomic for her and complains of discomfort   She is taking a lot of ibuprofen  Pain is worse at night especially with her back  No loss of control of bowel or bladder, no numbness or weakness down the legs  Last night she threw up because of what she feels was intolerance from the ibuprofen  She has not thrown up since then  No fevers or chills chest pain or shortness of breath  History of trauma: none   History of abdominal aortic aneurysm or male age 65-75 ever smoked: none     Denies any history of ulcers or kidney disease or any known contraindication to NSAIDs      Problem list, Medication list, Allergies, and Medical/Social/Surgical histories reviewed in [facility] and updated as appropriate.        REVIEW OF SYSTEMS  General: negative for fever, constitutional symptoms or weight loss  Resp: negative for chest pain or shortness of breath  CV: negative for chest pain  : negative for dysuria , incontinence, frequency  Musculoskeletal: as above  Neurologic: negative for ataxia, saddle anesthesia, fecal or urinary incontinence, one sided weakness,  Paresthesias  Constitutional, HEENT, cardiovascular, pulmonary, gi and gu systems are negative, except as otherwise noted.    Physical Exam:  Vitals: BP (!) 133/92   Pulse 94   Temp 99.1  F (37.3  C) (Oral)   Resp 16   Wt 84.4 kg (186 lb)   Breastfeeding? No   BMI 28.28 kg/m     BMI= Body mass index is 28.28 kg/m .  Constitutional: healthy, alert and no acute distress   Head: atraumatic  CARDIO: regular in rate and rhythm no murmurs rubs or gallops  RESP: lungs clear to auscultation  ABDOMEN: soft nontender  NEURO: Patellar reflexes intact and equal b/l  BACK:  Straight leg raise intact, No spine tenderness  Positive bilateral lumbar paraspinal muscle tenderness to palpation, strength intact and equal b/l lower extremities. Sensory intact. Rectal exam declined/deferred.   Patient had pain and tenderness over the left SI joint  Also increased pain in the groin with flexion adduction internal rotation of left hip  Bilateral wrist pain over volar aspect of wrist. Negative finkelstein. Negative tinnel. Negative phalen test  No erythema no warmth no swelling   GAIT: intact  Psychiatric: mentation appears normal and affect normal/bright      Impression:    ICD-10-CM    1. Bilateral low back pain without sciatica, unspecified chronicity M54.5 cyclobenzaprine (FLEXERIL) 5 MG tablet     ORTHO  REFERRAL   2. Hip pain, left M25.552 cyclobenzaprine (FLEXERIL) 5 MG tablet     ORTHO  REFERRAL   3. Pain in both wrists M25.531 cyclobenzaprine (FLEXERIL) 5 MG tablet    M25.532 ORTHO  REFERRAL         Plan:  Prescribed with flexeril   Do not take with other sedating meds  Sedating medications given. Aware not to drive or operate machinery while on these medications. Caution with .   Instructions for back care and return precautions discussed.    back pain stretching excercises discussed. supportive treatment.  considery physical therapy if not better despite supportive treatment.  Recommend orthopedic follow up for back and hip pain and wrist pain  activity modifications advised.  Over the counter medications discussed. Patient aware to avoid NSAIDS if with any kidney disease or ulcers. Proper dosing of over the counter medications likewise discussed.  Adverse reaction  to medication discussed.  aware to come in right away if with any fever or chills, worsening symptoms, headache, bowel or bladder incontinence, motor or sensory deficits or gait disturbances.   follow-up recommended.    Patient given bilateral wrist brace  Work note with recommendations for ergonomic work station given  Restrictions also given. No working past 6 hour shift at this time  Restrictions until seen by orthopedics    Alarm signs or symptoms discussed, if present recommend go to ER       Merary Carrillo MD

## 2019-04-11 NOTE — LETTER
[facility]  [address] [address]  Phone: [phone]    April 11, 2019        Zuleyma Reed  [address]  [facility] [address]          To whom it may concern:    RE: Zuleyma Reed    Patient was seen and treated today at our clinic.  Patient needs an ergonomic work station adjusted to her height and adequate desk space to avoid twisting of the back and hip.     Please contact me for questions or concerns.      Sincerely,        Merary Carrillo MD

## 2019-04-12 ENCOUNTER — ANCILLARY PROCEDURE (OUTPATIENT)
Dept: GENERAL RADIOLOGY | Facility: CLINIC | Age: 49
End: 2019-04-12
Attending: PEDIATRICS
Payer: COMMERCIAL

## 2019-04-12 ENCOUNTER — OFFICE VISIT (OUTPATIENT)
Dept: ORTHOPEDICS | Facility: CLINIC | Age: 49
End: 2019-04-12
Payer: OTHER MISCELLANEOUS

## 2019-04-12 VITALS
WEIGHT: 186 LBS | SYSTOLIC BLOOD PRESSURE: 122 MMHG | BODY MASS INDEX: 28.19 KG/M2 | DIASTOLIC BLOOD PRESSURE: 84 MMHG | HEIGHT: 68 IN

## 2019-04-12 DIAGNOSIS — M25.552 LEFT HIP PAIN: ICD-10-CM

## 2019-04-12 DIAGNOSIS — M54.50 ACUTE BILATERAL LOW BACK PAIN WITHOUT SCIATICA: ICD-10-CM

## 2019-04-12 DIAGNOSIS — M25.552 LEFT HIP PAIN: Primary | ICD-10-CM

## 2019-04-12 PROCEDURE — 73502 X-RAY EXAM HIP UNI 2-3 VIEWS: CPT

## 2019-04-12 PROCEDURE — 99214 OFFICE O/P EST MOD 30 MIN: CPT | Mod: 25 | Performed by: PEDIATRICS

## 2019-04-12 ASSESSMENT — MIFFLIN-ST. JEOR: SCORE: 1532.19

## 2019-04-12 NOTE — PROGRESS NOTES
Sports Medicine Clinic Visit    PCP: Jyotsna Ellis    Zuleyma Reed is a 48 year old female who is seen in consultation as a referral from Dr Carrillo presenting with low back and left hip pain.     Injury: No specific injury or accident.  Currently using work station that is not ergonomic.    **  Began initially lower back. Has not been sleeping well. Tried muscle relaxant.  Now radiating to left lateral hip, anterior hip.  No N/T.  No radiating symptoms.  No prior back issues.    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 Has been at [facility] [employer] [facility] [employer] for the past 1 month. Has sit-stand desk. Keyboard is not ergonomic either, having to extend wrists.  Has adjusted chair.      Location of Pain: bilateral lower back pain radiating to left buttock, left hip pain radiating to groin   Duration of Pain: 1 month(s)  Rating of Pain at worst: 7/10  Rating of Pain Currently: 5/10  Symptoms are better with: Flexeril   Symptoms are worse with: prolonged sitting and standing   Additional Features:   Positive: pain    Negative: paresthesias, numbness and weakness  Other evaluation and/or treatments so far consists of: stretching, Ibuprofen   Prior History of related problems: none     Social History: [employer] workforce.      Zuleyma was asked to complete the Oswestry Low Back Disability Index and Avani Start Back screening tool.  today in the office.  Disability score: 44.44%.     Review of Systems  Musculoskeletal: as above  Remainder of review of systems is negative including constitutional, CV, pulmonary, GI, Skin and Neurologic except as noted in HPI or medical history.    PMHx: No prior significant low back issues  PSHx: Noncontributory  fam hx: Noncontributory    Social History     Socioeconomic History     Marital status: Single     Spouse name: Not on file     Number of children: Not on file     Years of education: Not on file     Highest education level: Not on file   Occupational History     Not on file   Social Needs     Financial resource strain: Not on  "file     Food insecurity:     Worry: Not on file     Inability: Not on file     Transportation needs:     Medical: Not on file     Non-medical: Not on file   Tobacco Use     Smoking status: Former Smoker     Smokeless tobacco: Never Used   Substance and Sexual Activity     Alcohol use: Yes     Drug use: No     Sexual activity: Not on file   Lifestyle     Physical activity:     Days per week: Not on file     Minutes per session: Not on file     Stress: Not on file   Relationships     Social connections:     Talks on phone: Not on file     Gets together: Not on file     Attends Judaism service: Not on file     Active member of club or organization: Not on file     Attends meetings of clubs or organizations: Not on file     Relationship status: Not on file     Intimate partner violence:     Fear of current or ex partner: Not on file     Emotionally abused: Not on file     Physically abused: Not on file     Forced sexual activity: Not on file   Other Topics Concern     Not on file   Social History Narrative     Not on file       Objective  /84   Ht 1.727 m (5' 8\")   Wt 84.4 kg (186 lb)   BMI 28.28 kg/m      GENERAL APPEARANCE: healthy, alert and no distress   GAIT: NORMAL  SKIN: no suspicious lesions or rashes  NEURO: Normal strength and tone, mentation intact and speech normal  PSYCH:  mentation appears normal and affect normal/bright  HEENT: no scleral icterus  CV: no extremity edema  RESP: nonlabored breathing    Low back exam:    Inspection:       no visible deformity in the low back       normal skin       normal vascular       normal lymphatic    ROM:       limited flexion due to pain       limited extension due to pain    Tender:       paraspinal muscles    Non Tender:       remainder of lumbar spine    Strength:       hip flexion 5/5       knee extension 5/5       ankle dorsiflexion 5/5       ankle plantarflexion 5/5       dorsiflexion of the great toe 5/5    Reflexes:       patellar (L3, L4) " symmetric normal       achilles tendons (S1) symmetric normal    Sensation:      grossly intact throughout lower extremities    Skin:       well perfused       capillary refill brisk    Special tests:       straight leg raise left neg        straight leg raise right neg       Minimal change with RAGHAV        slump test with low back pain      Radiology:  Visualized radiographs of left hip, and reviewed the images with the patient.  Impression: No acute bony abnormality.  Hip joint spaces appear preserved.    Recent Results (from the past 744 hour(s))   XR Pelvis w Hip LT 1 View    Narrative    PELVIS AND HIP LEFT ONE VIEW   4/12/2019 2:06 PM     HISTORY: Left hip pain.    COMPARISON: None.      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 Joint spaces are well-preserved. No acute  fracture or subluxation. There is an IUD in place.    HARVINDER PARK MD         Assessment:  1. Left hip pain    2. Acute bilateral low back pain without sciatica        Plan:  Discussed the assessment with the patient.  Likely related to position/posture, ergonomics.  Some radiating symptoms, but does not clearly appear neurologic currently.    We discussed the following: symptom treatment, activity modification/rest, imaging, rehab, injection therapy, chiropractic care, acupuncture, massage therapy and medication. Following discussion, plan:  Topical Treatments: Ice, Heat or Topical Analgesics as needed  Over the counter medication: Patient's preferred OTC medication as needed  Prescription Medication: Has prescription for muscle relaxant, may continue.  Discussed potential for imaging of the low back.  I do not think required currently.  Activity Modification: Discussed what to alter/avoid  Work Restrictions letter provided  Rehab: Physical Therapy: Referral placed  Follow up: 3-4 weeks, sooner if needed.  We briefly discussed additional considerations of chiropractic care,  acupuncture, massage, also potential for injection therapy if advanced imaging ultimately obtained.  Questions answered. The patient indicates understanding of these issues and agrees with the plan.    Israel Munoz DO, CAQ    CC: Dr Carrillo          Disclaimer: This note consists of symbols derived from keyboarding, dictation and/or voice recognition software. As a result, there may be errors in the script that have gone undetected. Please consider this when interpreting information found in this chart.

## 2019-04-12 NOTE — LETTER
4/12/2019         RE: Zuleyma Reed  [address]  [place] [address]        Dear Colleague,    Thank you for referring your patient, Zuleyma Reed, to the [facility]. Please see a copy of my visit note below.    Sports Medicine Clinic Visit    PCP: Jyotsna Ellis    Zuleyma Reed is a 48 year old female who is seen in consultation as a referral from Dr Carrillo presenting with low back and left hip pain.     Injury: No specific injury or accident.  Currently using work station that is not ergonomic.    **  Began initially lower back. Has not been sleeping well. Tried muscle relaxant.  Now radiating to left lateral hip, anterior hip.  No N/T.  No radiating symptoms.  No prior back issues.    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 Has been at [facility] for the past 1 month. Has sit-stand desk. Keyboard is not ergonomic either, having to extend wrists.  Has adjusted chair.      Location of Pain: bilateral lower back pain radiating to left buttock, left hip pain radiating to groin   Duration of Pain: 1 month(s)  Rating of Pain at worst: 7/10  Rating of Pain Currently: 5/10  Symptoms are better with: Flexeril   Symptoms are worse with: prolonged sitting and standing   Additional Features:   Positive: pain    Negative: paresthesias, numbness and weakness  Other evaluation and/or treatments so far consists of: stretching, Ibuprofen   Prior History of related problems: none     Social History: [employer] workforce.      Zuleyma was asked to complete the Oswestry Low Back Disability Index and Avani Start Back screening tool.  today in the office.  Disability score: 44.44%.     Review of Systems  Musculoskeletal: as above  Remainder of review of systems is negative including constitutional, CV, pulmonary, GI, Skin and Neurologic except as noted in HPI or medical history.    PMHx: No prior significant low back issues  PSHx: Noncontributory  fam hx: Noncontributory    Social History     Socioeconomic History  "    Marital status: Single     Spouse name: Not on file     Number of children: Not on file     Years of education: Not on file     Highest education level: Not on file   Occupational History     Not on file   Social Needs     Financial resource strain: Not on file     Food insecurity:     Worry: Not on file     Inability: Not on file     Transportation needs:     Medical: Not on file     Non-medical: Not on file   Tobacco Use     Smoking status: Former Smoker     Smokeless tobacco: Never Used   Substance and Sexual Activity     Alcohol use: Yes     Drug use: No     Sexual activity: Not on file   Lifestyle     Physical activity:     Days per week: Not on file     Minutes per session: Not on file     Stress: Not on file   Relationships     Social connections:     Talks on phone: Not on file     Gets together: Not on file     Attends Quaker service: Not on file     Active member of club or organization: Not on file     Attends meetings of clubs or organizations: Not on file     Relationship status: Not on file     Intimate partner violence:     Fear of current or ex partner: Not on file     Emotionally abused: Not on file     Physically abused: Not on file     Forced sexual activity: Not on file   Other Topics Concern     Not on file   Social History Narrative     Not on file       Objective  /84   Ht 1.727 m (5' 8\")   Wt 84.4 kg (186 lb)   BMI 28.28 kg/m       GENERAL APPEARANCE: healthy, alert and no distress   GAIT: NORMAL  SKIN: no suspicious lesions or rashes  NEURO: Normal strength and tone, mentation intact and speech normal  PSYCH:  mentation appears normal and affect normal/bright  HEENT: no scleral icterus  CV: no extremity edema  RESP: nonlabored breathing    Low back exam:    Inspection:       no visible deformity in the low back       normal skin       normal vascular       normal lymphatic    ROM:       limited flexion due to pain       limited extension due to pain    Tender:       " paraspinal muscles    Non Tender:       remainder of lumbar spine    Strength:       hip flexion 5/5       knee extension 5/5       ankle dorsiflexion 5/5       ankle plantarflexion 5/5       dorsiflexion of the great toe 5/5    Reflexes:       patellar (L3, L4) symmetric normal       achilles tendons (S1) symmetric normal    Sensation:      grossly intact throughout lower extremities    Skin:       well perfused       capillary refill brisk    Special tests:       straight leg raise left neg        straight leg raise right neg       Minimal change with RAGHAV        slump test with low back pain      Radiology:  Visualized radiographs of left hip, and reviewed the images with the patient.  Impression: No acute bony abnormality.  Hip joint spaces appear preserved.    Recent Results (from the past 744 hour(s))   XR Pelvis w Hip LT 1 View    Narrative    PELVIS AND HIP LEFT ONE VIEW   4/12/2019 2:06 PM     HISTORY: Left hip pain.    COMPARISON: None.      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 Joint spaces are well-preserved. No acute  fracture or subluxation. There is an IUD in place.    HARVINDER PARK MD         Assessment:  1. Left hip pain    2. Acute bilateral low back pain without sciatica        Plan:  Discussed the assessment with the patient.  Likely related to position/posture, ergonomics.  Some radiating symptoms, but does not clearly appear neurologic currently.    We discussed the following: symptom treatment, activity modification/rest, imaging, rehab, injection therapy, chiropractic care, acupuncture, massage therapy and medication. Following discussion, plan:  Topical Treatments: Ice, Heat or Topical Analgesics as needed  Over the counter medication: Patient's preferred OTC medication as needed  Prescription Medication: Has prescription for muscle relaxant, may continue.  Discussed potential for imaging of the low back.  I do not think  required currently.  Activity Modification: Discussed what to alter/avoid  Work Restrictions letter provided  Rehab: Physical Therapy: Referral placed  Follow up: 3-4 weeks, sooner if needed.  We briefly discussed additional considerations of chiropractic care, acupuncture, massage, also potential for injection therapy if advanced imaging ultimately obtained.  Questions answered. The patient indicates understanding of these issues and agrees with the plan.    Israel Munoz DO, MICHAEL    CC: Dr Carrillo          Disclaimer: This note consists of symbols derived from keyboarding, dictation and/or voice recognition software. As a result, there may be errors in the script that have gone undetected. Please consider this when interpreting information found in this chart.        Again, thank you for allowing me to participate in the care of your patient.        Sincerely,        Israel Munoz DO

## 2019-04-12 NOTE — LETTER
REPORT OF WORK ABILITY    NOTE TO EMPLOYEE: You must promptly provide a copy of this report to your  employer or worker's compensation insurer, and Qualified Rehabilitation Consultant.    Date: 4/12/2019                     Employee Name: Zuleyma Reed         YOB: 1970  Medical Record Number: [id number]   Soc.Sec.No: [id number]  Employer: None                Date of Injury: 3/18/19  Managed Care Organization / Insurance Company Name: UNKNOWN    Diagnosis: low back pain with radiation, left hip pain  Work Related: yes     MMI: NO  Permanent Partial Disability (PPD) likely: UNKNOWN    EMPLOYEE IS ABLE TO WORK: Return to work with restrictions on next scheduled work date.     RESTRICTIONS IF ANY:  Restrict to maximum 6 hour work shift per 24 hour period.  Stand:  Occasionally (2-4 hours)  Sit:  Occasionally (2-4 hours)  Walk: Occasionally (2-4 hours)   Advise she should have the ability to get up and move for 5 minutes at least every 30 minutes if doing seated work.  Kneel/[place]:  avoid  Lift/carry:  10 lb. or less  Push/Pull:  10 lb. or less  Bend:  avoid  Stoop:  avoid  Twist:  avoid    OTHER RESTRICTIONS: None    TREATMENT PLAN/NOTES: change work position for comfort, best work height mid chest to mid thigh, may need to restrict work activities for comfort, start PT, continue medications as discussed and heat/cold pack for comfort and Rest as needed.          Israel BECKER   Subjective   Ms. Alayna Gomez is a 62 y.o. female who presents to the ED with c/o HTN. Her daughter gives most of her medical history 2/2 language barrier. She reports that a few days before Christmas Ms. Gomez began having waxing and waning blood pressures up to 200/100. On Christmas, Ms. Gomez developed a blood pressure of 220/120 so she went to the [facility]. She then had an appointment with her PCP a week later but had to come back into the ED again because she has continued to have waxing and waning blood pressures. She states that her blood pressure typically goes up when standing and goes down when lying down. During her episodes of hypertension she states that she is imbalanced, shakes, nauseous, and has blurred vision as well as abdominal pain. She also notes a cyst to the back of her head. She has a hx of HTN and anxiety. She states that many years ago she took medications of her anxiety once but it gave her palpitations so she stopped taking it. She has a hx of her grandfather passing away 2/2 HTN. No other acute complaints at this time.            History provided by:  Patient and relative  Hypertension   Severity:  Moderate  Onset quality:  Gradual  Duration:  1 month  Timing:  Intermittent  Progression:  Unchanged  Notable PTA blood pressures:  220/120  Associated symptoms: abdominal pain, anxiety, dizziness and nausea        Review of Systems   Eyes: Positive for visual disturbance (Blurred vision).   Gastrointestinal: Positive for abdominal pain and nausea.   Neurological: Positive for dizziness and tremors.   Psychiatric/Behavioral: The patient is nervous/anxious.    All other systems reviewed and are negative.      Past Medical History:   Diagnosis Date   • Hypertension        No Known Allergies    Past Surgical History:   Procedure Laterality Date   • HYSTERECTOMY         History reviewed. No pertinent family history.    Social History     Socioeconomic History   • Marital status:      Spouse name:  Not on file   • Number of children: Not on file   • Years of education: Not on file   • Highest education level: Not on file   Tobacco Use   • Smoking status: Never Smoker   Substance and Sexual Activity   • Alcohol use: No     Frequency: Never   • Drug use: No         Objective   Physical Exam   Constitutional: She is oriented to person, place, and time. She appears well-developed and well-nourished. No distress.   HENT:   Head: Normocephalic and atraumatic.   Nose: Nose normal.   Eyes: Conjunctivae are normal. No scleral icterus.   Neck: Normal range of motion. Neck supple.   Cardiovascular: Normal rate, regular rhythm and normal heart sounds.   No murmur heard.  Pulmonary/Chest: Effort normal and breath sounds normal. No respiratory distress.   Abdominal: Soft. There is no tenderness.   Musculoskeletal: Normal range of motion.   Neurological: She is alert and oriented to person, place, and time.   Skin: Skin is warm and dry. She is not diaphoretic.   Psychiatric: She has a normal mood and affect. Her behavior is normal.   Nursing note and vitals reviewed.      Procedures         ED Course  ED Course as of Jan 22 2129   Sun Jan 20, 2019   1359 I spoke with the patient, her , her daughter in great detail about hypertension as well as anxiety leading to hypertension.  They are very relieved.  The patient has now relaxed.  Her blood pressure slowly declined without further treatment.    Current untreated pressure is 140/80.  [MS]   1401 Dr. Park is at bedside re-evaluating the patient and updating them on plan.   [AT]      ED Course User Index  [AT] Sugar Alva  [MS] Rai Park MD       No results found for this or any previous visit (from the past 24 hour(s)).  Note: In addition to lab results from this visit, the labs listed above may include labs taken at another facility or during a different encounter within the last 24 hours. Please correlate lab times with ED admission and discharge times  "for further clarification of the services performed during this visit.    No orders to display     Vitals:    01/20/19 1236 01/20/19 1252 01/20/19 1345   BP: (!) 184/99 159/85 140/80   BP Location: Left arm     Patient Position: Sitting     Pulse: 74  62   Resp: 18     Temp: 98.4 °F (36.9 °C)     TempSrc: Oral     SpO2: 97%  94%   Weight: 93 kg (205 lb 0.4 oz)     Height: 160 cm (63\")       Medications - No data to display  ECG/EMG Results (last 24 hours)     Procedure Component Value Units Date/Time    ECG 12 Lead [[id number]] Collected:  01/20/19 1245     Updated:  01/20/19 1246        ECG 12 Lead   Final Result   Test Reason : DIZZY   Blood Pressure : **/** mmHG   Vent. Rate : 068 BPM     Atrial Rate : 068 BPM      P-R Int : 148 ms          QRS Dur : 098 ms       QT Int : 420 ms       P-R-T Axes : 059 017 040 degrees      QTc Int : 446 ms      Normal sinus rhythm   T wave abnormality, consider anterior ischemia   Abnormal ECG   No previous ECGs available   Confirmed by JOSUÉ PARK MD ([id number]) on 1/22/2019 3:51:39 PM      Referred By:  ED MD           Confirmed By:JOSUÉ PARK MD                      [facility]    Final diagnoses:   Uncontrolled hypertension   Anxiety       Documentation assistance provided by chong Alva.  Information recorded by the scribe was done at my direction and has been verified and validated by me.     Sugar Alva  01/20/19 1350       Sugar Alva  01/20/19 1401       Josué Park MD  01/22/19 4417    "

## 2019-04-18 ENCOUNTER — THERAPY VISIT (OUTPATIENT)
Dept: PHYSICAL THERAPY | Facility: CLINIC | Age: 49
End: 2019-04-18
Payer: COMMERCIAL

## 2019-04-18 DIAGNOSIS — M54.50 ACUTE BILATERAL LOW BACK PAIN WITHOUT SCIATICA: ICD-10-CM

## 2019-04-18 DIAGNOSIS — M54.42 BILATERAL LOW BACK PAIN WITH LEFT-SIDED SCIATICA: ICD-10-CM

## 2019-04-18 DIAGNOSIS — M25.552 LEFT HIP PAIN: ICD-10-CM

## 2019-04-18 PROCEDURE — 97161 PT EVAL LOW COMPLEX 20 MIN: CPT | Mod: GP | Performed by: PHYSICAL THERAPIST

## 2019-04-18 PROCEDURE — 97110 THERAPEUTIC EXERCISES: CPT | Mod: GP | Performed by: PHYSICAL THERAPIST

## 2019-04-18 ASSESSMENT — ACTIVITIES OF DAILY LIVING (ADL)
STEPPING_UP_AND_DOWN_CURBS: NO DIFFICULTY AT ALL
WALKING_UP_STEEP_HILLS: SLIGHT DIFFICULTY
WALKING_INITIALLY: MODERATE DIFFICULTY
HOS_ADL_COUNT: 17
DEEP_SQUATTING: MODERATE DIFFICULTY
WALKING_15_MINUTES_OR_GREATER: SLIGHT DIFFICULTY
GOING_DOWN_1_FLIGHT_OF_STAIRS: SLIGHT DIFFICULTY
GOING_UP_1_FLIGHT_OF_STAIRS: SLIGHT DIFFICULTY
STANDING_FOR_15_MINUTES: SLIGHT DIFFICULTY
TWISTING/PIVOTING_ON_INVOLVED_LEG: SLIGHT DIFFICULTY
GETTING_INTO_AND_OUT_OF_A_BATHTUB: NO DIFFICULTY AT ALL
HOS_ADL_HIGHEST_POTENTIAL_SCORE: 68
LIGHT_TO_MODERATE_WORK: SLIGHT DIFFICULTY
WALKING_APPROXIMATELY_10_MINUTES: SLIGHT DIFFICULTY
ROLLING_OVER_IN_BED: MODERATE DIFFICULTY
HEAVY_WORK: MODERATE DIFFICULTY
RECREATIONAL_ACTIVITIES: MODERATE DIFFICULTY
SITTING_FOR_15_MINUTES: SLIGHT DIFFICULTY
PUTTING_ON_SOCKS_AND_SHOES: SLIGHT DIFFICULTY
WALKING_DOWN_STEEP_HILLS: MODERATE DIFFICULTY
HOS_ADL_SCORE(%): 67.65
HOS_ADL_ITEM_SCORE_TOTAL: 46
GETTING_INTO_AND_OUT_OF_AN_AVERAGE_CAR: MODERATE DIFFICULTY

## 2019-04-18 NOTE — PROGRESS NOTES
[facility] Initial Evaluation  Subjective:  The history is provided by the patient. No  was used.   Zuleyma Reed is a 48 year old female with a left hip (Started in the low back and now it is in the left hip> R hip) condition.  Occurance: sitting.  Condition occurred: at work.  This is a new condition     Patient reports pain:  Anterior and groin.  Radiates to:  Low back and hip.  Pain is described as aching (spasm) and is intermittent and reported as 5/10.     Symptoms are exacerbated by sitting Relieved by: standing, walking.  Since onset symptoms are gradually worsening.        General health as reported by patient is good.  Past medical history: mental illness, depression, migraines, pain at night/rest.  Medical allergies: no.  Other surgeries include:  Orthopedic surgery.  Medication history: Muscle relaxants.  Current occupation is Patient , 6 hours per day on work restrictions    .  Patient is working in normal job with restrictions.      Barriers include:  None as reported by the patient.    Red flags:  None as reported by the patient.    Goal: perform a full work day without increased pain.                    Objective:  System    Physical Exam      Luis Felipe Lumbar Evaluation    Posture:  Sitting: good  Standing: good  Lordosis: WNL  Lateral Shift: no  Correction of Posture: better    Movement Loss:  Flexion (Flex): mod  Extension (EXT): major      Test Movements:  FIS: During: increases  After: worse    Repeat FIS: During: increases  After: worse and peripheralizing  Mechanical Response: DecrROM  EIS: During: increases  After: worse    Repeat EIS: During: increases  After: worse      EIL: During: increases  After: worse    Repeat EIL: During: increases  After: worse        Static Tests:          Lying Prone in Extension: decreases/ better. increases ROM and centralizing symptoms    Conclusion: derangement  Principle of  Treatment:      Extension: pt responding to sustained extension in lying with 2 pillows under the chest and elevated HOB x3-5 minutes                                           ROS    Assessment/Plan:    Patient is a 48 year old female with lumbar and left side hip complaints.    Patient has the following significant findings with corresponding treatment plan.                Diagnosis 1: Low Back Pain with Radiculopathy into B hips  Pain -  hot/cold therapy, manual therapy, self management, education, directional preference exercise and home program  Decreased ROM/flexibility - manual therapy and therapeutic exercise  Decreased joint mobility - manual therapy and therapeutic exercise      Previous and current functional limitations:  (See Goal Flow Sheet for this information)    Short term and Long term goals: (See Goal Flow Sheet for this information)     Communication ability:  Patient appears to be able to clearly communicate and understand verbal and written communication and follow directions correctly.  Treatment Explanation - The following has been discussed with the patient:   RX ordered/plan of care  Anticipated outcomes  Possible risks and side effects  This patient would benefit from PT intervention to resume normal activities.   Rehab potential is fair.    Frequency:  2 X week, once daily  Duration:  for 6 weeks  Discharge Plan:  Achieve all LTG.  Independent in home treatment program.  Reach maximal therapeutic benefit.    Please refer to the daily flowsheet for treatment today, total treatment time and time spent performing 1:1 timed codes.

## 2019-04-19 ENCOUNTER — HEALTH MAINTENANCE LETTER (OUTPATIENT)
Age: 49
End: 2019-04-19

## 2019-04-24 ENCOUNTER — OFFICE VISIT (OUTPATIENT)
Dept: ORTHOPEDICS | Facility: CLINIC | Age: 49
End: 2019-04-24
Payer: OTHER MISCELLANEOUS

## 2019-04-24 VITALS
BODY MASS INDEX: 28.19 KG/M2 | SYSTOLIC BLOOD PRESSURE: 128 MMHG | WEIGHT: 186 LBS | DIASTOLIC BLOOD PRESSURE: 84 MMHG | HEIGHT: 68 IN

## 2019-04-24 DIAGNOSIS — M54.50 ACUTE BILATERAL LOW BACK PAIN WITHOUT SCIATICA: ICD-10-CM

## 2019-04-24 DIAGNOSIS — M25.552 LEFT HIP PAIN: Primary | ICD-10-CM

## 2019-04-24 PROCEDURE — 99213 OFFICE O/P EST LOW 20 MIN: CPT | Performed by: PEDIATRICS

## 2019-04-24 ASSESSMENT — MIFFLIN-ST. JEOR: SCORE: 1532.19

## 2019-04-24 NOTE — PROGRESS NOTES
"Sports Medicine Clinic Visit    PCP: Jyotsna Ellis    Zuleyma Reed is a 48 year old female who is seen in f/u up for    Left hip pain  Acute bilateral low back pain without sciatica. Since last visit on 4/12/2019 patient has continued to have pain. She states she was barely able to move for the past 2 days.  Did begin PT on Friday, and feels this has increased her pain.  Woke up with a spasm on Saturday which caused her to fall.    Would also like to discuss her wrist pain.  She states it needs to be documented since it was part of the original w/c order.  Pain when she was working at a certain work station.  Caused her to have her wrists in a extended positions as she was working.  She states that since they have had her at different work stations following her issues, she has not had any pain in her wrists.       **  Pain is mostly in her back. Very occasional pain radiating into the front. Pain with laughing, sneezing, and coughing. Pain is located over the low band distribution. Reports a pulling sensation. Reports that something doesn't feel right in the back. For treatment, she was taking 1 tablet of Flexeril (5 mg). Improves when she is walking.     Review of Systems  All other systems reviewed and are negative unless noted above.    Past medical history: Reviewed. No pertinent past medical history.   Past surgical history: Reviewed. No pertinent past surgical history.     This document serves as a record of the services and decisions personally performed and made by DO MICHAEL Torres. It was created on his behalf by Arpita Sanabria, a trained medical scribe. The creation of this document is based the provider's statements to the medical scribe.    Scribe Arpita Sanabria 1:19 PM 4/24/2019    Objective  /84   Ht 1.727 m (5' 8\")   Wt 84.4 kg (186 lb)   BMI 28.28 kg/m      GENERAL APPEARANCE: healthy, alert and no distress   GAIT: methodical   SKIN: no suspicious lesions or rashes  NEURO: Normal " strength and tone, mentation intact and speech normal  PSYCH:  mentation appears normal and affect normal/bright  HEENT: no scleral icterus  CV: no extremity edema  RESP: nonlabored breathing       Exam  Low back exam:    Inspection:       no visible deformity in the low back       normal skin       normal vascular       normal lymphatic    ROM:       limited flexion due to pain, hands to mid thigh       limited extension, no pain       Hip flexion: grossly full but painful.     Tender:       Nontender to palpation     Sensation:       grossly intact throughout lower extremities    Special tests:        straight leg raise left negative         straight leg raise right negative        slump test negative      Radiology  None reviewed.     Assessment:  1. Left hip pain    2. Acute bilateral low back pain without sciatica        Plan:  Discussed the assessment with the patient.   Reviewed course.  We discussed potential for additional imaging, given lack of significant improvement so far.  I do not think MRI is required currently, in the absence of trauma.  However, MRI offered; patient deferred today.  Topical Treatments: Ice, Heat or Topical Analgesics (OTC icyhot, salonpas, or OTC lidocaine patch) as needed    Over the counter medication: Patient's preferred OTC medication as needed.   Also discussed she can continue taking Flexeril as prescribed as needed   Activity Modification: discussed.  Work Restrictions updated  Rehab: Continue with physical therapy.   Follow up: 4 weeks. Consider MRI at that time if not improving, or sooner if any concerns with her course.  Questions answered. The patient indicates understanding of these issues and agrees with the plan.    Israel Munoz, , CAQ         Disclaimer: This note consists of symbols derived from keyboarding, dictation and/or voice recognition software. As a result, there may be errors in the script that have gone undetected. Please consider this when  interpreting information found in this chart.    The information in this document, created by a scribe for me, accurately reflects the services I personally performed and the decisions made by me. I have reviewed and approved this document for accuracy.

## 2019-04-24 NOTE — LETTER
REPORT OF WORK ABILITY    NOTE TO EMPLOYEE: You must promptly provide a copy of this report to your  employer or worker's compensation insurer, and Qualified Rehabilitation Consultant.    Date: April 24, 2019                   Employee Name: Zuleyma Reed         YOB: 1970  Medical Record Number: [id number]   Soc.Sec.No: [id number]  Employer: None                Date of Injury: 3/18/19  Managed Care Organization / Insurance Company Name: UNKNOWN    Diagnosis: low back pain with radiation, left hip pain  Work Related: yes     MMI: NO  Permanent Partial Disability (PPD) likely: UNKNOWN    EMPLOYEE IS ABLE TO WORK: Return to work with restrictions on next scheduled work date.     RESTRICTIONS IF ANY:  Restrict to maximum 6 hour work shift per 24 hour period.  Stand:  Occasionally (2-4 hours)  Sit:  Occasionally (2-4 hours)  Walk: Occasionally (2-4 hours)   Advise she should have the ability to get up and move for 5 minutes at least every 30 minutes if doing seated work.  Kneel/[place]:  avoid  Lift/carry:  10 lb. or less  Push/Pull:  10 lb. or less  Bend:  avoid  Stoop:  avoid  Twist:  avoid    OTHER RESTRICTIONS: None    TREATMENT PLAN/NOTES: change work position for comfort, best work height mid chest to mid thigh, may need to restrict work activities for comfort, continue PT, may continue medications as discussed and heat/cold pack for comfort and Rest as needed.          Israel ARRIAGA.

## 2019-04-24 NOTE — PATIENT INSTRUCTIONS
- Ice/heat as needed   - Can try over-the-counter IcyHot, SalonPas patches, Lidocaine patches.  - Continue with physical therapy.  - Workability letter updated.    - Follow up in 4 weeks.

## 2019-04-24 NOTE — LETTER
REPORT OF WORK ABILITY    NOTE TO EMPLOYEE: You must promptly provide a copy of this report to your  employer or worker's compensation insurer, and Qualified Rehabilitation Consultant.    Date: April 24, 2019                   Employee Name: Zuleyma Reed         YOB: 1970  Medical Record Number: [id number]   Soc.Sec.No: [id number]  Employer: None                Date of Injury: 3/18/19  Managed Care Organization / Insurance Company Name: UNKNOWN    Diagnosis: low back pain with radiation, left hip pain  Work Related: yes     MMI: NO  Permanent Partial Disability (PPD) likely: UNKNOWN    EMPLOYEE IS ABLE TO WORK: Return to work with restrictions on next scheduled work date. Days missed this week (Mon, 4/22 and Tues, 4/23) should be medically excused due to increase in symptoms.     RESTRICTIONS IF ANY:  Restrict to maximum 6 hour work shift per 24 hour period.  Stand:  Occasionally (2-4 hours)  Sit:  Occasionally (2-4 hours)  Walk: Occasionally (2-4 hours)   Advise she should have the ability to get up and move for 5 minutes at least every 30 minutes if doing seated work.  Kneel/[place]:  avoid  Lift/carry:  10 lb. or less  Push/Pull:  10 lb. or less  Bend:  avoid  Stoop:  avoid  Twist:  avoid    OTHER RESTRICTIONS: None    TREATMENT PLAN/NOTES: change work position for comfort, best work height mid chest to mid thigh, may need to restrict work activities for comfort, continue PT, may continue medications as discussed and heat/cold pack for comfort and Rest as needed.        Israel BECKER

## 2019-04-24 NOTE — LETTER
4/24/2019         RE: Zuleyma Reed  [address]  [place] [address]        Dear Colleague,    Thank you for referring your patient, Zuleyma Reed, to the [facility] [place]. Please see a copy of my visit note below.    Sports Medicine Clinic Visit    PCP: Jyotsna Ellis    Zuleyma Reed is a 48 year old female who is seen in f/u up for    Left hip pain  Acute bilateral low back pain without sciatica. Since last visit on 4/12/2019 patient has continued to have pain. She states she was barely able to move for the past 2 days.  Did begin PT on Friday, and feels this has increased her pain.  Woke up with a spasm on Saturday which caused her to fall.    Would also like to discuss her wrist pain.  She states it needs to be documented since it was part of the original w/c order.  Pain when she was working at a certain work station.  Caused her to have her wrists in a extended positions as she was working.  She states that since they have had her at different work stations following her issues, she has not had any pain in her wrists.       **  Pain is mostly in her back. Very occasional pain radiating into the front. Pain with laughing, sneezing, and coughing. Pain is located over the low band distribution. Reports a pulling sensation. Reports that something doesn't feel right in the back. For treatment, she was taking 1 tablet of Flexeril (5 mg). Improves when she is walking.     Review of Systems  All other systems reviewed and are negative unless noted above.    Past medical history: Reviewed. No pertinent past medical history.   Past surgical history: Reviewed. No pertinent past surgical history.     This document serves as a record of the services and decisions personally performed and made by DO MICHAEL Torres. It was created on his behalf by Arpita Sanabria, a trained medical scribe. The creation of this document is based the provider's statements to the medical  "harjite.    Tito Sanabria 1:19 PM 4/24/2019    Objective  /84   Ht 1.727 m (5' 8\")   Wt 84.4 kg (186 lb)   BMI 28.28 kg/m       GENERAL APPEARANCE: healthy, alert and no distress   GAIT: methodical   SKIN: no suspicious lesions or rashes  NEURO: Normal strength and tone, mentation intact and speech normal  PSYCH:  mentation appears normal and affect normal/bright  HEENT: no scleral icterus  CV: no extremity edema  RESP: nonlabored breathing       Exam  Low back exam:    Inspection:       no visible deformity in the low back       normal skin       normal vascular       normal lymphatic    ROM:       limited flexion due to pain, hands to mid thigh       limited extension, no pain       Hip flexion: grossly full but painful.     Tender:       Nontender to palpation     Sensation:       grossly intact throughout lower extremities    Special tests:        straight leg raise left negative         straight leg raise right negative        slump test negative      Radiology  None reviewed.     Assessment:  1. Left hip pain    2. Acute bilateral low back pain without sciatica        Plan:  Discussed the assessment with the patient.   Reviewed course.  We discussed potential for additional imaging, given lack of significant improvement so far.  I do not think MRI is required currently, in the absence of trauma.  However, MRI offered; patient deferred today.  Topical Treatments: Ice, Heat or Topical Analgesics (OTC icyhot, salonpas, or OTC lidocaine patch) as needed    Over the counter medication: Patient's preferred OTC medication as needed.   Also discussed she can continue taking Flexeril as prescribed as needed   Activity Modification: discussed.  Work Restrictions updated  Rehab: Continue with physical therapy.   Follow up: 4 weeks. Consider MRI at that time if not improving, or sooner if any concerns with her course.  Questions answered. The patient indicates understanding of these issues and agrees with the " plan.    Israel Munoz DO, CAQ         Disclaimer: This note consists of symbols derived from keyboarding, dictation and/or voice recognition software. As a result, there may be errors in the script that have gone undetected. Please consider this when interpreting information found in this chart.    The information in this document, created by a scribe for me, accurately reflects the services I personally performed and the decisions made by me. I have reviewed and approved this document for accuracy.          Again, thank you for allowing me to participate in the care of your patient.        Sincerely,        Israel Munoz DO

## 2019-04-25 ENCOUNTER — TELEPHONE (OUTPATIENT)
Dept: ORTHOPEDICS | Facility: CLINIC | Age: 49
End: 2019-04-25

## 2019-04-25 ENCOUNTER — THERAPY VISIT (OUTPATIENT)
Dept: PHYSICAL THERAPY | Facility: CLINIC | Age: 49
End: 2019-04-25
Payer: COMMERCIAL

## 2019-04-25 DIAGNOSIS — M54.42 BILATERAL LOW BACK PAIN WITH LEFT-SIDED SCIATICA: ICD-10-CM

## 2019-04-25 PROCEDURE — 97140 MANUAL THERAPY 1/> REGIONS: CPT | Mod: GP | Performed by: PHYSICAL THERAPIST

## 2019-04-25 PROCEDURE — 97110 THERAPEUTIC EXERCISES: CPT | Mod: GP | Performed by: PHYSICAL THERAPIST

## 2019-04-25 NOTE — TELEPHONE ENCOUNTER
Patient wants the issue with her wrist addressed in her workability letter. Please give her a call if questions

## 2019-04-25 NOTE — LETTER
REPORT OF WORK ABILITY    NOTE TO EMPLOYEE: You must promptly provide a copy of this report to your  employer or worker's compensation insurer, and Qualified Rehabilitation Consultant.    Date: April 29, 2019                 Employee Name: Zuleyma Reed         YOB: 1970  Medical Record Number: [id number]   Soc.Sec.No: [id number]  Employer: None                Date of Injury: 3/18/19  Managed Care Organization / Insurance Company Name: UNKNOWN    Diagnosis: low back pain with radiation, left hip pain,    bilateral wrist pain (resolved)  Work Related: yes     MMI: NO  Permanent Partial Disability (PPD) likely: UNKNOWN    EMPLOYEE IS ABLE TO WORK: Return to work with restrictions on next scheduled work date. Days missed this week (Mon, 4/22 and Tues, 4/23) should be medically excused due to increase in symptoms.     RESTRICTIONS IF ANY:  Restrict to maximum 6 hour work shift per 24 hour period.  Stand:  Occasionally (2-4 hours)  Sit:  Occasionally (2-4 hours)  Walk: Occasionally (2-4 hours)   Advise she should have the ability to get up and move for 5 minutes at least every 30 minutes if doing seated work.  Kneel/[place]:  avoid  Lift/carry:  10 lb. or less  Push/Pull:  10 lb. or less  Bend:  avoid  Stoop:  avoid  Twist:  Avoid  No restrictions for wrists.    OTHER RESTRICTIONS: None    TREATMENT PLAN/NOTES: change work position for comfort, best work height mid chest to mid thigh, may need to restrict work activities for comfort, continue PT, may continue medications as discussed and heat/cold pack for comfort and Rest as needed.        Israel BECKER

## 2019-04-27 NOTE — TELEPHONE ENCOUNTER
Her wrist issue was noted in the chart note from her most recent visit. She indicated that her symptoms had resolved. As such, we didn't discuss further, I didn't do an evaluation of the wrist.   If needed, can update/revise the workability letter to include wrist, but would be to include that she had wrist pain that resolved and does not require further evaluation or treatment. Or separate letter is ok by me for her to submit.  Thanks.  Israel Munoz, , CAQ

## 2019-04-29 NOTE — TELEPHONE ENCOUNTER
Called and spoke with patient.  She states  needs this on the workability. Workability updated with no restrictions for wrist pain.  Would like it emailed to [email or web address]   Lupe Adames MS ATC

## 2019-04-30 ENCOUNTER — THERAPY VISIT (OUTPATIENT)
Dept: PHYSICAL THERAPY | Facility: CLINIC | Age: 49
End: 2019-04-30
Payer: COMMERCIAL

## 2019-04-30 DIAGNOSIS — M54.42 BILATERAL LOW BACK PAIN WITH LEFT-SIDED SCIATICA: ICD-10-CM

## 2019-04-30 PROCEDURE — 97140 MANUAL THERAPY 1/> REGIONS: CPT | Mod: GP | Performed by: PHYSICAL THERAPIST

## 2019-04-30 PROCEDURE — 97110 THERAPEUTIC EXERCISES: CPT | Mod: GP | Performed by: PHYSICAL THERAPIST

## 2019-05-06 ENCOUNTER — THERAPY VISIT (OUTPATIENT)
Dept: PHYSICAL THERAPY | Facility: CLINIC | Age: 49
End: 2019-05-06
Payer: OTHER MISCELLANEOUS

## 2019-05-06 DIAGNOSIS — M54.42 BILATERAL LOW BACK PAIN WITH LEFT-SIDED SCIATICA: ICD-10-CM

## 2019-05-06 PROCEDURE — 97110 THERAPEUTIC EXERCISES: CPT | Mod: GP | Performed by: PHYSICAL THERAPIST

## 2019-05-06 PROCEDURE — 97140 MANUAL THERAPY 1/> REGIONS: CPT | Mod: GP | Performed by: PHYSICAL THERAPIST

## 2019-05-21 ENCOUNTER — OFFICE VISIT (OUTPATIENT)
Dept: ORTHOPEDICS | Facility: CLINIC | Age: 49
End: 2019-05-21
Payer: OTHER MISCELLANEOUS

## 2019-05-21 VITALS
WEIGHT: 188 LBS | DIASTOLIC BLOOD PRESSURE: 88 MMHG | BODY MASS INDEX: 28.49 KG/M2 | SYSTOLIC BLOOD PRESSURE: 122 MMHG | HEIGHT: 68 IN

## 2019-05-21 DIAGNOSIS — M25.552 LEFT HIP PAIN: Primary | ICD-10-CM

## 2019-05-21 DIAGNOSIS — M54.50 ACUTE BILATERAL LOW BACK PAIN WITHOUT SCIATICA: ICD-10-CM

## 2019-05-21 PROCEDURE — 99213 OFFICE O/P EST LOW 20 MIN: CPT | Performed by: PEDIATRICS

## 2019-05-21 ASSESSMENT — MIFFLIN-ST. JEOR: SCORE: 1541.26

## 2019-05-21 NOTE — PROGRESS NOTES
"Sports Medicine Clinic Visit    PCP: No Ref-Primary, Physician    Zuleyma Reed is a 48 year old female who is seen in f/u up as an AIC for    Left hip pain  Acute bilateral low back pain without sciatica. Since last visit on 4/24/2019 patient has had improvement in her back pain.  Feels that PT has been helpful.     She would like to return to work full duty.    **  Now at [employer] full time.        Review of Systems  All other systems reviewed and are negative unless noted above.    PMHx, PSHx: Reviewed.  Unchanged.    Objective  /88   Ht 1.727 m (5' 8\")   Wt 85.3 kg (188 lb)   BMI 28.59 kg/m      GENERAL APPEARANCE: healthy, alert and no distress   GAIT: NORMAL  SKIN: no suspicious lesions or rashes  NEURO: Normal strength and tone, mentation intact and speech normal  PSYCH:  mentation appears normal and affect normal/bright  HEENT: no scleral icterus  CV: no extremity edema  RESP: nonlabored breathing      Exam  Lumbar spine:  Grossly full range of motion of the low back with flexion, extension, lateral bending.  No pain with motion.  Lower extremity strength grossly intact.      Radiology  None new today.    Assessment:  1. Left hip pain    2. Acute bilateral low back pain without sciatica        Plan:  Discussed the assessment with the patient.  Symptoms have improved.  Finish physical therapy course.  Updated letter for work.  Return to work full duty with no restrictions.  Follow up: Will leave as needed.  Questions answered. The patient indicates understanding of these issues and agrees with the plan.    Israel Munoz, DO, CAQ          Disclaimer: This note consists of symbols derived from keyboarding, dictation and/or voice recognition software. As a result, there may be errors in the script that have gone undetected. Please consider this when interpreting information found in this chart.      "

## 2019-05-21 NOTE — LETTER
REPORT OF WORK ABILITY    NOTE TO EMPLOYEE: You must promptly provide a copy of this report to your  employer or worker's compensation insurer, and Qualified Rehabilitation Consultant.    Date: May 21, 2019                 Employee Name: Zuleyma Reed         YOB: 1970  Medical Record Number: [id number]   Soc.Sec.No: [id number]  Employer: None                Date of Injury: 3/18/19  Managed Care Organization / Insurance Company Name: UNKNOWN    Diagnosis: low back pain with radiation, left hip pain, both improved    bilateral wrist pain (resolved)  Work Related: yes     MMI: NO  Permanent Partial Disability (PPD) likely: UNKNOWN    EMPLOYEE IS ABLE TO WORK: Return to work with no restrictions on next scheduled work date.      RESTRICTIONS IF ANY:  None.    OTHER RESTRICTIONS: None    TREATMENT PLAN/NOTES: continue home exercises from [employer]

## 2019-05-21 NOTE — LETTER
"    5/21/2019         RE: Zuleyma Reed  [address]  [place] [address]        Dear Colleague,    Thank you for referring your patient, Zuleyma Reed, to the [facility] [place]. Please see a copy of my visit note below.    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 Since last visit on 4/24/2019 patient has had improvement in her back pain.  Feels that PT has been helpful.     She would like to return to work full duty.    **  Now at [employer] full time.        Review of Systems  All other systems reviewed and are negative unless noted above.    PMHx, PSHx: Reviewed.  Unchanged.    Objective  /88   Ht 1.727 m (5' 8\")   Wt 85.3 kg (188 lb)   BMI 28.59 kg/m       GENERAL APPEARANCE: healthy, alert and no distress   GAIT: NORMAL  SKIN: no suspicious lesions or rashes  NEURO: Normal strength and tone, mentation intact and speech normal  PSYCH:  mentation appears normal and affect normal/bright  HEENT: no scleral icterus  CV: no extremity edema  RESP: nonlabored breathing      Exam  Lumbar spine:  Grossly full range of motion of the low back with flexion, extension, lateral bending.  No pain with motion.  Lower extremity strength grossly intact.      Radiology  None new today.    Assessment:  1. Left hip pain    2. Acute bilateral low back pain without sciatica        Plan:  Discussed the assessment with the patient.  Symptoms have improved.  Finish physical therapy course.  Updated letter for work.  Return to work full duty with no restrictions.  Follow up: Will leave as needed.  Questions answered. The patient indicates understanding of these issues and agrees with the plan.    Israel Munoz, DO, CAQ          Disclaimer: This note consists of symbols derived from keyboarding, dictation and/or voice " recognition software. As a result, there may be errors in the script that have gone undetected. Please consider this when interpreting information found in this chart.        Again, thank you for allowing me to participate in the care of your patient.        Sincerely,        Israel Munoz, DO

## 2019-05-23 ENCOUNTER — OFFICE VISIT (OUTPATIENT)
Dept: PEDIATRICS | Facility: CLINIC | Age: 49
End: 2019-05-23
Payer: COMMERCIAL

## 2019-05-23 ENCOUNTER — OFFICE VISIT (OUTPATIENT)
Dept: PSYCHOLOGY | Facility: CLINIC | Age: 49
End: 2019-05-23
Payer: COMMERCIAL

## 2019-05-23 ENCOUNTER — ANCILLARY PROCEDURE (OUTPATIENT)
Dept: ULTRASOUND IMAGING | Facility: CLINIC | Age: 49
End: 2019-05-23
Attending: NURSE PRACTITIONER
Payer: COMMERCIAL

## 2019-05-23 VITALS
TEMPERATURE: 98.7 F | WEIGHT: 185.6 LBS | BODY MASS INDEX: 28.22 KG/M2 | SYSTOLIC BLOOD PRESSURE: 120 MMHG | DIASTOLIC BLOOD PRESSURE: 80 MMHG | OXYGEN SATURATION: 100 % | HEART RATE: 95 BPM

## 2019-05-23 DIAGNOSIS — F41.8 DEPRESSION WITH ANXIETY: Primary | ICD-10-CM

## 2019-05-23 DIAGNOSIS — E04.9 THYROID ENLARGED: ICD-10-CM

## 2019-05-23 PROCEDURE — 99214 OFFICE O/P EST MOD 30 MIN: CPT | Performed by: NURSE PRACTITIONER

## 2019-05-23 PROCEDURE — 76536 US EXAM OF HEAD AND NECK: CPT | Performed by: RADIOLOGY

## 2019-05-23 RX ORDER — ESCITALOPRAM OXALATE 10 MG/1
10 TABLET ORAL DAILY
Qty: 30 TABLET | Refills: 1 | Status: SHIPPED | OUTPATIENT
Start: 2019-05-23 | End: 2019-06-17 | Stop reason: ALTCHOICE

## 2019-05-23 ASSESSMENT — PATIENT HEALTH QUESTIONNAIRE - PHQ9
5. POOR APPETITE OR OVEREATING: MORE THAN HALF THE DAYS
SUM OF ALL RESPONSES TO PHQ QUESTIONS 1-9: 21

## 2019-05-23 ASSESSMENT — ANXIETY QUESTIONNAIRES
3. WORRYING TOO MUCH ABOUT DIFFERENT THINGS: NEARLY EVERY DAY
IF YOU CHECKED OFF ANY PROBLEMS ON THIS QUESTIONNAIRE, HOW DIFFICULT HAVE THESE PROBLEMS MADE IT FOR YOU TO DO YOUR WORK, TAKE CARE OF THINGS AT HOME, OR GET ALONG WITH OTHER PEOPLE: VERY DIFFICULT
5. BEING SO RESTLESS THAT IT IS HARD TO SIT STILL: NOT AT ALL
GAD7 TOTAL SCORE: 11
2. NOT BEING ABLE TO STOP OR CONTROL WORRYING: NEARLY EVERY DAY
7. FEELING AFRAID AS IF SOMETHING AWFUL MIGHT HAPPEN: NOT AT ALL
6. BECOMING EASILY ANNOYED OR IRRITABLE: SEVERAL DAYS
1. FEELING NERVOUS, ANXIOUS, OR ON EDGE: MORE THAN HALF THE DAYS

## 2019-05-23 NOTE — NURSING NOTE
"Chief Complaint   Patient presents with     Depression     has been about 6-7 years since she has been on medication     Anxiety       Initial /80 (BP Location: Right arm, Patient Position: Sitting, Cuff Size: Adult Regular)   Pulse 95   Temp 98.7  F (37.1  C) (Temporal)   Wt 84.2 kg (185 lb 9.6 oz)   SpO2 100%   Breastfeeding? No   BMI 28.22 kg/m   Estimated body mass index is 28.22 kg/m  as calculated from the following:    Height as of 5/21/19: 1.727 m (5' 8\").    Weight as of this encounter: 84.2 kg (185 lb 9.6 oz).  Medication Reconciliation: complete      MICHAEL Andrews      "

## 2019-05-23 NOTE — PROGRESS NOTES
Patient had appointment to establish care with PCP. [facility] services were offered. Patient reported worsening depression, and discussed concerns with how it impacts all areas of life. Patient stated that she is interested and motivated to participate in mental health care. Patient reported history of suicidal thoughts, with a previous suicide attempt 10 years ago. Patient reported that she experiences suicidal thoughts without plan or intent. Patient stated that she could not attempt to kill herself because of her three pets. Patient denied any safety concerns with leaving the clinic today. Explained the role of the [facility] and provided contact information for the [facility].  Patient needing appointment after 3:30pm due to work schedule.  New appointment scheduled, with [facility] alerting patient if earlier appointment becomes available.     Cheli Al, [facility]   Behavioral Health Clinician

## 2019-05-23 NOTE — PATIENT INSTRUCTIONS
PLAN:   1.   Symptomatic therapy suggested: start on the lexapro once a day.    2.  Orders Placed This Encounter   Medications     escitalopram (LEXAPRO) 10 MG tablet     Sig: Take 1 tablet (10 mg) by mouth daily     Dispense:  30 tablet     Refill:  1     3. Patient needs to follow up in if no improvement,or sooner if worsening of symptoms or other symptoms develop.  Initiated consultation with FREDDY Ojeda, Behavioral Health Clinician for mental health counseling.   Thyroid ultrasound   Follow up in 1 month.

## 2019-05-23 NOTE — PROGRESS NOTES
Subjective     Zuleyma Reed is a 48 year old female who presents to clinic today for the following health issues:  Has been seeing Jyotsna Ellis at [facility]   HPI   Abnormal Mood Symptoms  Onset: ongoing for many years, getting worse     Description:   Depression: YES  Anxiety: YES    Accompanying Signs & Symptoms:  Still participating in activities that you used to enjoy: no  Fatigue: YES  Irritability: YES  Difficulty concentrating: YES  Changes in appetite: YES- increased eating  Problems with sleep: YES  Heart racing/beating fast : YES  Thoughts of hurting yourself or others: yes    History:   Recent stress: YES- 2 years ago  Prior depression hospitalization: 2001   Family history of depression: YES  Family history of anxiety: YES    Precipitating factors:   Alcohol/drug use: no    Alleviating factors:  none    Therapies Tried and outcome: Wellbutrin (Bupropion) and xanax was the last two medications she remembers. Has tried multiple medications in the past.  Has not been on medications for several years but having increasing stressors at home  Made a complete lifestyle change a few years to started new job   Gave up soda   Lost 80 lbs and had more energy   Then went through a recent loss of a relationship where he had been seeing other woman  Had lost one of her jobs due to recent breakdown   Lives about 30 miles away in [place]   Did not like Wellbutrin in the past. States always in a fog  Has been on multiple medications in the past and has seen psychiatry in the past   Does not remember anything that has worked for her in the past   Has been on Zoloft may citalopram   Has not seen a counselor in years  Has suicidal thoughts all the time but no  plan but says she would not do anything and keeps going for her animals  Has a dog and 2 cats.       Also has issues with her right knee and has had to have surgery on this knee in past         Patient Active Problem List   Diagnosis      Bilateral low back pain with left-sided sciatica     Depression with anxiety     History reviewed. No pertinent surgical history.    Social History     Tobacco Use     Smoking status: Former Smoker     Smokeless tobacco: Never Used   Substance Use Topics     Alcohol use: Yes     Family History   Problem Relation Age of Onset     Lupus Father      Autoimmune Disease Paternal Aunt         ALS     Autoimmune Disease Paternal Aunt         crohn's         Current Outpatient Medications   Medication Sig Dispense Refill     cyclobenzaprine (FLEXERIL) 5 MG tablet May take 1 or 2 tablets 3 times a day as needed for muscle spasm and pain. Sedating. Preferably take at bedtime 90 tablet 0     fluticasone (FLONASE) 50 MCG/ACT spray Spray 1-2 sprays into both nostrils daily 1 Bottle 0     Pseudoephedrine-Naproxen Na (SINUS & COLD-D PO)        No Known Allergies  BP Readings from Last 3 Encounters:   05/23/19 120/80   05/21/19 122/88   04/24/19 128/84    Wt Readings from Last 3 Encounters:   05/23/19 84.2 kg (185 lb 9.6 oz)   05/21/19 85.3 kg (188 lb)   04/24/19 84.4 kg (186 lb)         Reviewed and updated as needed this visit by Provider         Review of Systems   ROS COMP: CONSTITUTIONAL:POSITIVE  for weight gain and NEGATIVE  for sweats  ENT/MOUTH: NEGATIVE for ear, mouth and throat problems  RESP:NEGATIVE for significant cough or SOB  CV:NEGATIVE for chest pain/chest pressure and syncope or near-syncope  GI: NEGATIVE for nausea, poor appetite and vomiting  MUSCULOSKELETAL: NEGATIVE for significant arthralgias or myalgia  NEURO: NEGATIVE for weakness, dizziness or paresthesias  GYN: does have Mirena IUD and was wondering if that is component as well.   ENDOCRINE: NEGATIVE for HX thyroid disease, polydipsia, polyphagia and polyuria  PSYCHIATRIC: POSITIVE forHx anxiety, Hx depression and suicidal thoughts with out a plan. Suicidal attempt in 1990 or 91  and NEGATIVE foralcohol abuse, suicidal thoughts with specific plan and  thoughts of hurting someone else      Objective    /80 (BP Location: Right arm, Patient Position: Sitting, Cuff Size: Adult Regular)   Pulse 95   Temp 98.7  F (37.1  C) (Temporal)   Wt 84.2 kg (185 lb 9.6 oz)   SpO2 100%   Breastfeeding? No   BMI 28.22 kg/m    Body mass index is 28.22 kg/m .  Physical Exam   GENERAL APPEARANCE: alert, active and no distress  NECK: no adenopathy  Thyroid exam reveals thyroid enlarged.  RESP: lungs clear to auscultation - no rales, rhonchi or wheezes  CV: regular rates and rhythm and no murmur, click or rub  MS: extremities normal- no gross deformities noted and peripheral pulses normal  SKIN: Skin color, texture, turgor normal.   NEURO: Normal strength and tone, mentation intact and speech normal  PSYCH: mentation appears normal and affect normal/bright  MENTAL STATUS EXAM:  Alert, oriented, thought content appropriate, affect: flat, when questioned about suicide, the patient expresses suicidal ideation without plan, no homicidal ideation,mentation appears normal., patient appears normal, good hygiene, affect abnormal , sad, anxious, oriented X 3  MENTAL STATUS EXAM:  Appearance/Behavior: No apparent distress, Casually groomed and Dressed appropriately for weather  Speech: Normal  Mood/Affect: depressed affect, anxiety and labile  Insight: Fair    Diagnostic Test Results:  none         Assessment & Plan     Zuleyma was seen today for depression and anxiety.    Diagnoses and all orders for this visit:    Depression with anxiety  -     Start:  escitalopram (LEXAPRO) 10 MG tablet; Take 1 tablet (10 mg) by mouth daily  Initiate medication with Lexapro 10 mg q day  Reviewed concept of depression as function of biochemical imbalance of neurotransmitters/rationale for treatment.  Risks and benefits of medication(s) reviewed with patient.  Questions answered.  Counseling advised  Followup appointment in 1 month(s)  Patient instructed to call for significant side effects medications  "or problems  Patient advised immediate presentation to hospital for suicidal thought, etc.  Initiated consultation with FREDDY Ojeda, Behavioral Health Clinician for mental health counseling.       Thyroid enlarged  -     US Thyroid; Future  Will follow up and/or notify patient of  results via My Chart to determine further need for followup         See Patient Instructions  Patient Instructions     PLAN:   1.   Symptomatic therapy suggested: start on the lexapro once a day.    2.  Orders Placed This Encounter   Medications     escitalopram (LEXAPRO) 10 MG tablet     Sig: Take 1 tablet (10 mg) by mouth daily     Dispense:  30 tablet     Refill:  1     3. Patient needs to follow up in if no improvement,or sooner if worsening of symptoms or other symptoms develop.  Initiated consultation with FREDDY Ojeda, Behavioral Health Clinician for mental health counseling.   Thyroid ultrasound   Follow up in 1 month.      BMI:   Estimated body mass index is 28.22 kg/m  as calculated from the following:    Height as of 5/21/19: 1.727 m (5' 8\").    Weight as of this encounter: 84.2 kg (185 lb 9.6 oz).   Weight management plan: Discussed healthy diet and exercise guidelines          No follow-ups on file.    Hannah Parker, BERTRAM CNP  [facility]      "

## 2019-05-24 ASSESSMENT — ANXIETY QUESTIONNAIRES: GAD7 TOTAL SCORE: 11

## 2019-05-24 NOTE — RESULT ENCOUNTER NOTE
Homer Reed,  Attached are your test results.  Very small cyst and nodule present   No further imaging needed   Please contact us if you have any questions.    Hannah Parker, CNP

## 2019-05-28 ENCOUNTER — MYC MEDICAL ADVICE (OUTPATIENT)
Dept: PEDIATRICS | Facility: CLINIC | Age: 49
End: 2019-05-28

## 2019-05-28 DIAGNOSIS — E04.1 THYROID NODULE: Primary | ICD-10-CM

## 2019-05-29 NOTE — TELEPHONE ENCOUNTER
Please call patient and let her know not unusual to have nodule in thyroid and no further imaging recommended.However,   There is some slight calcification present so what we could do is have her see endocrinology to be sure how we need to follow her     Please call [phone] to make appointment  if you do not hear from referrals in the next few days.

## 2019-06-17 ENCOUNTER — OFFICE VISIT (OUTPATIENT)
Dept: PEDIATRICS | Facility: CLINIC | Age: 49
End: 2019-06-17
Payer: COMMERCIAL

## 2019-06-17 ENCOUNTER — OFFICE VISIT (OUTPATIENT)
Dept: PSYCHOLOGY | Facility: CLINIC | Age: 49
End: 2019-06-17
Payer: COMMERCIAL

## 2019-06-17 VITALS
HEART RATE: 71 BPM | SYSTOLIC BLOOD PRESSURE: 130 MMHG | OXYGEN SATURATION: 99 % | BODY MASS INDEX: 28.51 KG/M2 | TEMPERATURE: 98.7 F | DIASTOLIC BLOOD PRESSURE: 70 MMHG | WEIGHT: 187.5 LBS

## 2019-06-17 DIAGNOSIS — F41.8 DEPRESSION WITH ANXIETY: Primary | ICD-10-CM

## 2019-06-17 DIAGNOSIS — Z13.29 SCREENING FOR THYROID DISORDER: ICD-10-CM

## 2019-06-17 DIAGNOSIS — Z13.0 SCREENING FOR DISORDER OF BLOOD AND BLOOD-FORMING ORGANS: ICD-10-CM

## 2019-06-17 DIAGNOSIS — Z13.1 SCREENING FOR DIABETES MELLITUS (DM): ICD-10-CM

## 2019-06-17 DIAGNOSIS — Z13.6 CARDIOVASCULAR SCREENING; LDL GOAL LESS THAN 160: ICD-10-CM

## 2019-06-17 PROCEDURE — 99214 OFFICE O/P EST MOD 30 MIN: CPT | Performed by: NURSE PRACTITIONER

## 2019-06-17 RX ORDER — BUPROPION HYDROCHLORIDE 150 MG/1
150 TABLET, EXTENDED RELEASE ORAL 2 TIMES DAILY
Qty: 60 TABLET | Refills: 0 | Status: SHIPPED | OUTPATIENT
Start: 2019-06-17 | End: 2019-07-15

## 2019-06-17 ASSESSMENT — ANXIETY QUESTIONNAIRES
5. BEING SO RESTLESS THAT IT IS HARD TO SIT STILL: SEVERAL DAYS
6. BECOMING EASILY ANNOYED OR IRRITABLE: SEVERAL DAYS
IF YOU CHECKED OFF ANY PROBLEMS ON THIS QUESTIONNAIRE, HOW DIFFICULT HAVE THESE PROBLEMS MADE IT FOR YOU TO DO YOUR WORK, TAKE CARE OF THINGS AT HOME, OR GET ALONG WITH OTHER PEOPLE: VERY DIFFICULT
2. NOT BEING ABLE TO STOP OR CONTROL WORRYING: MORE THAN HALF THE DAYS
3. WORRYING TOO MUCH ABOUT DIFFERENT THINGS: MORE THAN HALF THE DAYS
1. FEELING NERVOUS, ANXIOUS, OR ON EDGE: MORE THAN HALF THE DAYS
GAD7 TOTAL SCORE: 11
7. FEELING AFRAID AS IF SOMETHING AWFUL MIGHT HAPPEN: SEVERAL DAYS

## 2019-06-17 ASSESSMENT — PATIENT HEALTH QUESTIONNAIRE - PHQ9
5. POOR APPETITE OR OVEREATING: MORE THAN HALF THE DAYS
SUM OF ALL RESPONSES TO PHQ QUESTIONS 1-9: 19

## 2019-06-17 NOTE — PATIENT INSTRUCTIONS
PLAN:   1.   Symptomatic therapy suggested: stop the lexapro and will start on Wellbutrin.  2.  Orders Placed This Encounter   Medications     buPROPion (WELLBUTRIN SR) 150 MG 12 hr tablet     Sig: Take 1 tablet (150 mg) by mouth 2 times daily     Dispense:  60 tablet     Refill:  0     Orders Placed This Encounter   Procedures     TSH with free T4 reflex     Vitamin D Deficiency     Lipid panel reflex to direct LDL Fasting     Comprehensive metabolic panel     CBC with platelets     MENTAL HEALTH REFERRAL  - Adult; Psychiatry and Medication Management; Psychiatry; [facility]: Psychiatry Clinic [phone]; We will contact you to schedule the appointment or please call with any questions       3. Patient needs to follow up in if no improvement,or sooner if worsening of symptoms or other symptoms develop.  CONSULTATION/REFERRAL to Pyschiatry  Make appointment for fasting labs   Will follow up and/or notify patient of  results via My Chart to determine further need for followup  Follow up in 2 weeks on the new medication   Initiated consultation with FREDDY Ojeda, Behavioral Health Clinician for mental health counseling.

## 2019-06-17 NOTE — PROGRESS NOTES
Patient had follow up appointment with her primary care provider. [facility] services were re- offered due to concerns about worsening depression. Patient denied changes in frequency or severity of suicidal thoughts. She stated that she continues to have thoughts, but reported that they are not everyday. Patient stated that distractions help to improve symptoms including going to work, talking to co-workers, and spending time with friends. Patient stated that being alone makes thoughts worsen, but reported that she is alone at night due to living alone and not having many friends. Patient stated that she is tired of constant depression and wants to be able to feel better, clean her home, and feel accomplished with tasks at home. Patient stated that has not considered plans to end her life, denied taking any steps to end her level, and denied intent. Patient reported that she wants to feel better, but feels overwhelmed with knowing where to start and how to begin. Patient confirmed no guns in the home, denied need for changes at home to feel more safe. Patient provided [facility] crisis phone number.  She continues to state that she lives for her pets. Patient has intake with [facility] scheduled for 6/28, with request to be seen sooner if later afternoon appointment becomes available.     Cheli Al, [facility]   Behavioral Health Clinician

## 2019-06-17 NOTE — PROGRESS NOTES
"Subjective     Zuleyma Reed is a 49 year old female who presents to clinic today for the following health issues:    HPI   Depression and Anxiety Follow-Up    How are you doing with your depression since your last visit? Worsened     How are you doing with your anxiety since your last visit?  No change    Are you having other symptoms that might be associated with depression or anxiety? Yes:  hard to wake up, feels \"weird\"    Have you had a significant life event? No     Do you have any concerns with your use of alcohol or other drugs? No  Has not seen counseling yet   Last Friday overslept   Whately weak and legs felt funny and just felt like her body felt like lead   Has suicidal thoughts and is worried she is going to lose her job   Has been on multiple medications in the past zoloft, lexapro, wellbutrin she thinks  Was on disability for depression in her past and got off in 2011 and worked hard to be off of it at that time   Was on disability for depression at least 6 years  Was hospitalized in 2003     Social History     Tobacco Use     Smoking status: Former Smoker     Smokeless tobacco: Never Used   Substance Use Topics     Alcohol use: Yes     Drug use: No     PHQ-9 SCORE 5/23/2019   PHQ-9 Total Score 21       FAVIAN-7 SCORE 5/23/2019   Total Score 11       [facility] Follow-up to PHQ 5/23/2019   PHQ-9 9. Suicide Ideation past 2 weeks More than half the days       In the past two weeks have you had thoughts of suicide or self-harm?  Yes  In the past two weeks have you thought of a plan or intent to harm yourself? No.  Do you have concerns about your personal safety or the safety of others?   No    Suicide Assessment Five-step Evaluation and Treatment (SAFE-T)    Amount of exercise or physical activity: None    Problems taking medications regularly: No    Medication side effects: possible side effects. Hard to wake up at times feels \"weird\"    Diet: regular (no restrictions)    Patient Active Problem List   Diagnosis "     Bilateral low back pain with left-sided sciatica     Depression with anxiety     History reviewed. No pertinent surgical history.    Social History     Tobacco Use     Smoking status: Former Smoker     Smokeless tobacco: Never Used   Substance Use Topics     Alcohol use: Yes     Family History   Problem Relation Age of Onset     Lupus Father      Autoimmune Disease Paternal Aunt         ALS     Autoimmune Disease Paternal Aunt         crohn's         Current Outpatient Medications   Medication Sig Dispense Refill     cyclobenzaprine (FLEXERIL) 5 MG tablet May take 1 or 2 tablets 3 times a day as needed for muscle spasm and pain. Sedating. Preferably take at bedtime 90 tablet 0     escitalopram (LEXAPRO) 10 MG tablet Take 1 tablet (10 mg) by mouth daily 30 tablet 1     fluticasone (FLONASE) 50 MCG/ACT spray Spray 1-2 sprays into both nostrils daily 1 Bottle 0     Pseudoephedrine-Naproxen Na (SINUS & COLD-D PO)        No Known Allergies  BP Readings from Last 3 Encounters:   06/17/19 130/70   05/23/19 120/80   05/21/19 122/88    Wt Readings from Last 3 Encounters:   06/17/19 85 kg (187 lb 8 oz)   05/23/19 84.2 kg (185 lb 9.6 oz)   05/21/19 85.3 kg (188 lb)              Reviewed and updated as needed this visit by Provider         Review of Systems   ROS COMP: Constitutional, HEENT, cardiovascular, pulmonary, gi and gu systems are negative, except as otherwise noted.      Objective    /70 (BP Location: Right arm, Patient Position: Sitting, Cuff Size: Adult Regular)   Pulse 71   Temp 98.7  F (37.1  C) (Temporal)   Wt 85 kg (187 lb 8 oz)   SpO2 99%   Breastfeeding? No   BMI 28.51 kg/m    Body mass index is 28.51 kg/m .  Physical Exam   GENERAL APPEARANCE: alert, active and no distress  NECK: normal and supple  RESP: lungs clear to auscultation - no rales, rhonchi or wheezes  CV: regular rates and rhythm and no murmur, click or rub  MS: extremities normal- no gross deformities noted  NEURO: Normal strength  and tone, mentation intact and speech normal  PSYCH: mentation appears normal, patient appearance--, affect flat, anxious, fatigued and worried  MENTAL STATUS EXAM:  Appearance/Behavior: No apparent distress, Casually groomed and Dressed appropriately for weather  Speech: Normal  Mood/Affect: depressed affect and anxiety  Insight: Adequate and Fair    Diagnostic Test Results:  Pending orders and results           Assessment & Plan     Zuleyma was seen today for depression and anxiety.    Diagnoses and all orders for this visit:    Depression with anxiety  -     Vitamin D Deficiency; Future  -     buPROPion (WELLBUTRIN SR) 150 MG 12 hr tablet; Take 1 tablet (150 mg) by mouth 2 times daily  -     MENTAL HEALTH REFERRAL  - Adult; Psychiatry and Medication Management; Psychiatry; [facility]: Psychiatry Clinic [phone]; We will contact you to schedule the appointment or please call with any questions    CARDIOVASCULAR SCREENING; LDL GOAL LESS THAN 160  -     Lipid panel reflex to direct LDL Fasting; Future    Screening for diabetes mellitus (DM)  -     Comprehensive metabolic panel; Future    Screening for thyroid disorder  -     TSH with free T4 reflex; Future    Screening for disorder of blood and blood-forming organs  -     CBC with platelets; Future      Patient Instructions     PLAN:   1.   Symptomatic therapy suggested: stop the lexapro and will start on Wellbutrin.  Reviewed concept of depression as function of biochemical imbalance of neurotransmitters/rationale for treatment.  Risks and benefits of medication(s) reviewed with patient.  Questions answered.  Counseling advised  Refer to psychiatrist  Followup appointment in 2 month(s)  Patient instructed to call for significant side effects medications or problems  Patient advised immediate presentation to hospital for suicidal thought, etc.  No-harm verbal contract agreed upon      2.  Orders Placed This Encounter   Medications     buPROPion (WELLBUTRIN SR) 150 MG 12  "hr tablet     Sig: Take 1 tablet (150 mg) by mouth 2 times daily     Dispense:  60 tablet     Refill:  0     Orders Placed This Encounter   Procedures     TSH with free T4 reflex     Vitamin D Deficiency     Lipid panel reflex to direct LDL Fasting     Comprehensive metabolic panel     CBC with platelets     MENTAL HEALTH REFERRAL  - Adult; Psychiatry and Medication Management; Psychiatry; [facility]: Psychiatry Clinic [phone]; We will contact you to schedule the appointment or please call with any questions       3. Patient needs to follow up in if no improvement,or sooner if worsening of symptoms or other symptoms develop.  CONSULTATION/REFERRAL to Pyschiatry  Make appointment for fasting labs   Will follow up and/or notify patient of  results via My Chart to determine further need for followup  Follow up in 2 weeks on the new medication   Initiated consultation with FREDDY Ojeda, Behavioral Health Clinician for mental health counseling.     BMI:   Estimated body mass index is 28.51 kg/m  as calculated from the following:    Height as of 5/21/19: 1.727 m (5' 8\").    Weight as of this encounter: 85 kg (187 lb 8 oz).   Weight management plan: Discussed healthy diet and exercise guidelines    25 min spent in direct face to face time with this pt, greater than 50% in counseling and coordination of care.    No follow-ups on file.    BERTRAM Gilmore CNP  [facility]      "

## 2019-06-17 NOTE — LETTER
[address] [address]  Phone: [phone]  Fax: [phone]    June 17, 2019        Zuleyma Reed  [address]  [place] [address]          To whom it may concern:    RE: Zuleyma Reed    Patient was seen and treated today at our clinic.  She has missed work this last Friday due to medical issues related to visit today     Please contact me for questions or concerns.      Sincerely,        BERTRAM Gilmore CNP

## 2019-06-17 NOTE — NURSING NOTE
"Chief Complaint   Patient presents with     Depression     Anxiety       Initial /70 (BP Location: Right arm, Patient Position: Sitting, Cuff Size: Adult Regular)   Pulse 71   Temp 98.7  F (37.1  C) (Temporal)   Wt 85 kg (187 lb 8 oz)   SpO2 99%   Breastfeeding? No   BMI 28.51 kg/m   Estimated body mass index is 28.51 kg/m  as calculated from the following:    Height as of 5/21/19: 1.727 m (5' 8\").    Weight as of this encounter: 85 kg (187 lb 8 oz).  Medication Reconciliation: complete      MICHAEL Andrews      "

## 2019-06-18 ASSESSMENT — ANXIETY QUESTIONNAIRES: GAD7 TOTAL SCORE: 11

## 2019-06-21 ENCOUNTER — TELEPHONE (OUTPATIENT)
Dept: ORTHOPEDICS | Facility: CLINIC | Age: 49
End: 2019-06-21

## 2019-06-25 ENCOUNTER — TELEPHONE (OUTPATIENT)
Dept: PSYCHIATRY | Facility: CLINIC | Age: 49
End: 2019-06-25

## 2019-06-25 NOTE — TELEPHONE ENCOUNTER
PSYCHIATRY CLINIC PHONE INTAKE     SERVICES REQUESTED / INTERESTED IN          Med Management    Presenting Problem and Brief History                              What would you like to be seen for? (brief description):  Patient's medication was changed from lexapro to wellbutrin about a week ago because of bad side effects. Her PCP is prescribing medication at this time but wants her to see a psychiatrist because of the severity of her past.    Have you received a mental health diagnosis? Yes   Which one (s): Depression and Anxiety  Is there any history of developmental delay?  No   Are you currently seeing a mental health provider?  No            Who / month last seen:    Do you have mental health records elsewhere?  Yes  Will you sign a release so we can obtain them?  Yes    Have you ever been hospitalized for psychiatric reasons?  Yes  Describe:  1) 1991 for suicide attempt, 2) 2003 - didn't trust herself    Do you have current thoughts of self-harm?  Yes  - no plan or intent  Do you currently have thoughts of harming others?  No       Substance Use History     Do you have any history of alcohol / illicit drug use?  No  Describe:    Have you ever received treatment for this?  No    Describe:       Social History     Does the patient have a guardian?  No    Name / number:   Have you had an ACT team in last 12 months?  No  Describe:    Do you have any current or past legal issues?  No  Describe:    OK to leave a detailed voicemail?  Yes    Medical/ Surgical History                                   Patient Active Problem List   Diagnosis     Bilateral low back pain with left-sided sciatica     Depression with anxiety          Medications             Current Outpatient Medications   Medication Sig Dispense Refill     buPROPion (WELLBUTRIN SR) 150 MG 12 hr tablet Take 1 tablet (150 mg) by mouth 2 times daily 60 tablet 0     cyclobenzaprine (FLEXERIL) 5 MG tablet May take 1 or 2 tablets 3 times a day as needed for  muscle spasm and pain. Sedating. Preferably take at bedtime 90 tablet 0     fluticasone (FLONASE) 50 MCG/ACT spray Spray 1-2 sprays into both nostrils daily 1 Bottle 0     Pseudoephedrine-Naproxen Na (SINUS & COLD-D PO)            DISPOSITION      Completed phone screen with patient. Added to AGE wait list for first available provider.    Marion Lim,

## 2019-06-26 DIAGNOSIS — Z13.29 SCREENING FOR THYROID DISORDER: ICD-10-CM

## 2019-06-26 DIAGNOSIS — Z13.6 CARDIOVASCULAR SCREENING; LDL GOAL LESS THAN 160: ICD-10-CM

## 2019-06-26 DIAGNOSIS — Z13.0 SCREENING FOR DISORDER OF BLOOD AND BLOOD-FORMING ORGANS: ICD-10-CM

## 2019-06-26 DIAGNOSIS — Z13.1 SCREENING FOR DIABETES MELLITUS (DM): ICD-10-CM

## 2019-06-26 DIAGNOSIS — F41.8 DEPRESSION WITH ANXIETY: ICD-10-CM

## 2019-06-26 LAB
ALBUMIN SERPL-MCNC: 4 G/DL (ref 3.4–5)
ALP SERPL-CCNC: 63 U/L (ref 40–150)
ALT SERPL W P-5'-P-CCNC: 21 U/L (ref 0–50)
ANION GAP SERPL CALCULATED.3IONS-SCNC: 5 MMOL/L (ref 3–14)
AST SERPL W P-5'-P-CCNC: 13 U/L (ref 0–45)
BILIRUB SERPL-MCNC: 0.3 MG/DL (ref 0.2–1.3)
BUN SERPL-MCNC: 10 MG/DL (ref 7–30)
CALCIUM SERPL-MCNC: 8.9 MG/DL (ref 8.5–10.1)
CHLORIDE SERPL-SCNC: 109 MMOL/L (ref 94–109)
CHOLEST SERPL-MCNC: 162 MG/DL
CO2 SERPL-SCNC: 28 MMOL/L (ref 20–32)
CREAT SERPL-MCNC: 0.92 MG/DL (ref 0.52–1.04)
DEPRECATED CALCIDIOL+CALCIFEROL SERPL-MC: 81 UG/L (ref 20–75)
ERYTHROCYTE [DISTWIDTH] IN BLOOD BY AUTOMATED COUNT: 12.7 % (ref 10–15)
GFR SERPL CREATININE-BSD FRML MDRD: 74 ML/MIN/{1.73_M2}
GLUCOSE SERPL-MCNC: 100 MG/DL (ref 70–99)
HCT VFR BLD AUTO: 46.4 % (ref 35–47)
HDLC SERPL-MCNC: 57 MG/DL
HGB BLD-MCNC: 15.7 G/DL (ref 11.7–15.7)
LDLC SERPL CALC-MCNC: 91 MG/DL
MCH RBC QN AUTO: 30.4 PG (ref 26.5–33)
MCHC RBC AUTO-ENTMCNC: 33.8 G/DL (ref 31.5–36.5)
MCV RBC AUTO: 90 FL (ref 78–100)
NONHDLC SERPL-MCNC: 105 MG/DL
PLATELET # BLD AUTO: 275 10E9/L (ref 150–450)
POTASSIUM SERPL-SCNC: 4.1 MMOL/L (ref 3.4–5.3)
PROT SERPL-MCNC: 7.4 G/DL (ref 6.8–8.8)
RBC # BLD AUTO: 5.16 10E12/L (ref 3.8–5.2)
SODIUM SERPL-SCNC: 142 MMOL/L (ref 133–144)
TRIGL SERPL-MCNC: 69 MG/DL
TSH SERPL DL<=0.005 MIU/L-ACNC: 1.87 MU/L (ref 0.4–4)
WBC # BLD AUTO: 8.2 10E9/L (ref 4–11)

## 2019-06-26 PROCEDURE — 85027 COMPLETE CBC AUTOMATED: CPT | Performed by: NURSE PRACTITIONER

## 2019-06-26 PROCEDURE — 80053 COMPREHEN METABOLIC PANEL: CPT | Performed by: NURSE PRACTITIONER

## 2019-06-26 PROCEDURE — 36415 COLL VENOUS BLD VENIPUNCTURE: CPT | Performed by: NURSE PRACTITIONER

## 2019-06-26 PROCEDURE — 82306 VITAMIN D 25 HYDROXY: CPT | Performed by: NURSE PRACTITIONER

## 2019-06-26 PROCEDURE — 84443 ASSAY THYROID STIM HORMONE: CPT | Performed by: NURSE PRACTITIONER

## 2019-06-26 PROCEDURE — 80061 LIPID PANEL: CPT | Performed by: NURSE PRACTITIONER

## 2019-06-26 NOTE — RESULT ENCOUNTER NOTE
Homer Reed,    Attached are your test results.  -Normal red blood cell (hgb) levels, normal white blood cell count and normal platelet levels.  -Cholesterol levels (LDL,HDL, Triglycerides) are normal.  ADVISE: rechecking in 1 year.   -Liver and gallbladder tests are normal (ALT,AST, Alk phos, bilirubin), kidney function is normal (Cr, GFR), sodium is normal, potassium is normal, calcium is normal, glucose is normal.  -TSH (thyroid stimulating hormone) level is normal which indicates normal thyroid function.   Please contact us if you have any questions.    Hannah Parker, CNP

## 2019-06-27 NOTE — RESULT ENCOUNTER NOTE
Homer Reed,    Attached are your test results.  -Vitamin D level is above  normal and not sure exactly how much you are taking in a day but need to decrease the amount    Please contact us if you have any questions.    Hannah Parker, CNP

## 2019-06-28 ENCOUNTER — OFFICE VISIT (OUTPATIENT)
Dept: PSYCHOLOGY | Facility: CLINIC | Age: 49
End: 2019-06-28
Payer: COMMERCIAL

## 2019-06-28 DIAGNOSIS — F43.10 PTSD (POST-TRAUMATIC STRESS DISORDER): ICD-10-CM

## 2019-06-28 DIAGNOSIS — F33.1 MODERATE EPISODE OF RECURRENT MAJOR DEPRESSIVE DISORDER (H): Primary | ICD-10-CM

## 2019-06-28 PROCEDURE — 90791 PSYCH DIAGNOSTIC EVALUATION: CPT | Performed by: SOCIAL WORKER
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
## 2019-06-28 NOTE — TELEPHONE ENCOUNTER
Await update from pt.  If doing well, then place at [facility] with PPD zero.  Thanks.  Israel Munoz, , CAQ

## 2019-06-28 NOTE — TELEPHONE ENCOUNTER
LVM for patient to call back with an update on how she is doing in order to complete w/c paperwork  Lupe Adames MS ATC

## 2019-07-01 ASSESSMENT — ANXIETY QUESTIONNAIRES
2. NOT BEING ABLE TO STOP OR CONTROL WORRYING: SEVERAL DAYS
IF YOU CHECKED OFF ANY PROBLEMS ON THIS QUESTIONNAIRE, HOW DIFFICULT HAVE THESE PROBLEMS MADE IT FOR YOU TO DO YOUR WORK, TAKE CARE OF THINGS AT HOME, OR GET ALONG WITH OTHER PEOPLE: SOMEWHAT DIFFICULT
5. BEING SO RESTLESS THAT IT IS HARD TO SIT STILL: SEVERAL DAYS
GAD7 TOTAL SCORE: 5
7. FEELING AFRAID AS IF SOMETHING AWFUL MIGHT HAPPEN: NOT AT ALL
6. BECOMING EASILY ANNOYED OR IRRITABLE: NOT AT ALL
3. WORRYING TOO MUCH ABOUT DIFFERENT THINGS: SEVERAL DAYS
1. FEELING NERVOUS, ANXIOUS, OR ON EDGE: SEVERAL DAYS

## 2019-07-01 ASSESSMENT — PATIENT HEALTH QUESTIONNAIRE - PHQ9
SUM OF ALL RESPONSES TO PHQ QUESTIONS 1-9: 16
5. POOR APPETITE OR OVEREATING: SEVERAL DAYS

## 2019-07-02 ASSESSMENT — ANXIETY QUESTIONNAIRES: GAD7 TOTAL SCORE: 5

## 2019-07-15 DIAGNOSIS — F41.8 DEPRESSION WITH ANXIETY: ICD-10-CM

## 2019-07-15 RX ORDER — BUPROPION HYDROCHLORIDE 150 MG/1
150 TABLET, EXTENDED RELEASE ORAL 2 TIMES DAILY
Qty: 60 TABLET | Refills: 0 | Status: SHIPPED | OUTPATIENT
Start: 2019-07-15 | End: 2019-08-15

## 2019-07-15 NOTE — LETTER
July 15, 2019      Zuleyma Reed  [address]  [place] [address]              Dear Zuleyma,    We recently received a refill request from your pharmacy requesting a refill of : Wellbutrin.    A review of your chart indicates that your last office visit was on 6/17.  An appointment was required in 2 weeks with your provider. We have authorized a one time 30 day refill of your medication to allow time for you to schedule.     Please call the clinic at [phone], or log in to your [email or web address] account at [email or web address] to schedule your appointment within that time frame so there is no delay in next month's refill.    Thank you for taking an active role in your healthcare.    Sincerely,      JONATHAN Norris CNP    If you need assistance with your [employer] log in, please call [phone].

## 2019-07-15 NOTE — TELEPHONE ENCOUNTER
LOV- 6/17 says: Follow up in 2 weeks on the new medication. Saw [facility] on 6/28. Sent barb and letter.  Chelsea Garcia RN

## 2019-07-17 ENCOUNTER — OFFICE VISIT (OUTPATIENT)
Dept: PEDIATRICS | Facility: CLINIC | Age: 49
End: 2019-07-17
Payer: COMMERCIAL

## 2019-07-17 ENCOUNTER — ANCILLARY PROCEDURE (OUTPATIENT)
Dept: GENERAL RADIOLOGY | Facility: CLINIC | Age: 49
End: 2019-07-17
Attending: FAMILY MEDICINE
Payer: COMMERCIAL

## 2019-07-17 VITALS
OXYGEN SATURATION: 100 % | TEMPERATURE: 100.1 F | DIASTOLIC BLOOD PRESSURE: 84 MMHG | WEIGHT: 188.1 LBS | SYSTOLIC BLOOD PRESSURE: 128 MMHG | HEART RATE: 99 BPM | BODY MASS INDEX: 28.6 KG/M2

## 2019-07-17 DIAGNOSIS — L03.119 CELLULITIS OF FOOT: Primary | ICD-10-CM

## 2019-07-17 DIAGNOSIS — R50.9 FEVER, UNSPECIFIED FEVER CAUSE: ICD-10-CM

## 2019-07-17 DIAGNOSIS — Z23 NEED FOR DIPHTHERIA-TETANUS-PERTUSSIS (TDAP) VACCINE: ICD-10-CM

## 2019-07-17 PROBLEM — Z97.5 IUD (INTRAUTERINE DEVICE) IN PLACE: Status: ACTIVE | Noted: 2017-05-15

## 2019-07-17 PROCEDURE — 90471 IMMUNIZATION ADMIN: CPT | Performed by: FAMILY MEDICINE

## 2019-07-17 PROCEDURE — 96372 THER/PROPH/DIAG INJ SC/IM: CPT | Mod: 59 | Performed by: FAMILY MEDICINE

## 2019-07-17 PROCEDURE — 99214 OFFICE O/P EST MOD 30 MIN: CPT | Mod: 25 | Performed by: FAMILY MEDICINE

## 2019-07-17 PROCEDURE — 73630 X-RAY EXAM OF FOOT: CPT | Mod: LT | Performed by: RADIOLOGY

## 2019-07-17 PROCEDURE — 90715 TDAP VACCINE 7 YRS/> IM: CPT | Performed by: FAMILY MEDICINE

## 2019-07-17 RX ORDER — SULFAMETHOXAZOLE/TRIMETHOPRIM 800-160 MG
1 TABLET ORAL 2 TIMES DAILY
Qty: 20 TABLET | Refills: 0 | Status: SHIPPED | OUTPATIENT
Start: 2019-07-17 | End: 2019-07-17

## 2019-07-17 RX ORDER — SULFAMETHOXAZOLE/TRIMETHOPRIM 800-160 MG
1 TABLET ORAL 2 TIMES DAILY
Qty: 20 TABLET | Refills: 0 | Status: SHIPPED | OUTPATIENT
Start: 2019-07-17 | End: 2019-07-22

## 2019-07-17 RX ORDER — KETOROLAC TROMETHAMINE 30 MG/ML
30 INJECTION, SOLUTION INTRAMUSCULAR; INTRAVENOUS ONCE
Status: COMPLETED | OUTPATIENT
Start: 2019-07-17 | End: 2019-07-17

## 2019-07-17 RX ORDER — HYDROCODONE BITARTRATE AND ACETAMINOPHEN 5; 325 MG/1; MG/1
1 TABLET ORAL EVERY 6 HOURS PRN
Qty: 30 TABLET | Refills: 0 | Status: ON HOLD | OUTPATIENT
Start: 2019-07-17 | End: 2019-10-22

## 2019-07-17 RX ADMIN — KETOROLAC TROMETHAMINE 30 MG: 30 INJECTION, SOLUTION INTRAMUSCULAR; INTRAVENOUS at 10:20

## 2019-07-17 NOTE — LETTER
[address] [address]  [phone]  [phone]      7/17/2019    Re: Zuleyma NAM Reed      TO WHOM IT MAY CONCERN:    Zuleyma BROWNING Gagandeeplizbeth  was seen on 7/17/2019.  Please excuse her  until  Cleared by a provider.    Cordially    Geeta Adam MD

## 2019-07-17 NOTE — NURSING NOTE
Clinic Administered Medication Documentation      Injectable Medication Documentation    Patient was given Ketorolac Tromethamine (Toradol). Prior to medication administration, verified patients identity using patient s name and date of birth. Please see MAR and medication order for additional information. Patient instructed to remain in clinic for 15 minutes and report any adverse reaction to staff immediately .      Was entire vial of medication used? Yes  Vial/Syringe: Single dose vial  Expiration Date:  01/2021  Was this medication supplied by the patient? No     Screening Questionnaire for Adult Immunization    Are you sick today?   No   Do you have allergies to medications, food, a vaccine component or latex?   No   Have you ever had a serious reaction after receiving a vaccination?   No   Do you have a long-term health problem with heart disease, lung disease, asthma, kidney disease, metabolic disease (e.g. diabetes), anemia, or other blood disorder?   No   Do you have cancer, leukemia, HIV/AIDS, or any other immune system problem?   No   In the past 3 months, have you taken medications that affect  your immune system, such as prednisone, other steroids, or anticancer drugs; drugs for the treatment of rheumatoid arthritis, Crohn s disease, or psoriasis; or have you had radiation treatments?   No   Have you had a seizure, or a brain or other nervous system problem?   No   During the past year, have you received a transfusion of blood or blood     products, or been given immune (gamma) globulin or antiviral drug?   No   For women: Are you pregnant or is there a chance you could become        pregnant during the next month?   No   Have you received any vaccinations in the past 4 weeks?   No     Immunization questionnaire answers were all negative.        Per orders of Dr. Adam, injection of tdap given by Kari Heaton. Patient instructed to remain in clinic for 15 minutes afterwards, and to report any  adverse reaction to me immediately.       Screening performed by Kari Heaton on 7/17/2019 at 10:18 AM.

## 2019-07-17 NOTE — PROGRESS NOTES
Subjective     Zuleyma Reed is a 49 year old female who presents to clinic today for the following health issues:    HPI   Patient here with concerns for redness, tenderness and swelling starting from 3rd and 4th toe and extending to mid foot of dorsum of left foot. Present for 3 days but worse today and associated with low grade temperature and chills. She thought it was poison IVY so has been putting an over the counter poison ivy cream on it for the past 2 days.        Reviewed and updated as needed this visit by Provider  Tobacco  Allergies  Meds  Problems  Med Hx  Surg Hx  Fam Hx         Review of Systems   ROS COMP: Constitutional, HEENT, cardiovascular, pulmonary, GI, , musculoskeletal, neuro, skin, endocrine and psych systems are negative, except as otherwise noted.      Objective    /84   Pulse 99   Temp 100.1  F (37.8  C)   Wt 85.3 kg (188 lb 1.6 oz)   SpO2 100%   BMI 28.60 kg/m    Body mass index is 28.6 kg/m .  Physical Exam   GENERAL: healthy, alert and no distress  MS: 3rd and 4th toe of left foot is swollen and quit erythematous with erythema extending to mid foot of dorsum of left foot. A border was drawn around foot.  Results for orders placed or performed in visit on 07/17/19   XR Foot Left G/E 3 Views    Narrative    Exam: 3 views of the right foot dated 7/17/2019.    COMPARISON: None.    CLINICAL HISTORY: Cellulitis.    FINDINGS: AP, oblique, and lateral views of the left foot were  obtained. The bones are well aligned. The joint spaces are  well-maintained. No displaced fractures. No erosive changes.      Impression    IMPRESSION: No acute bone abnormality in the left foot.    HARINDER HANSEN MD             Assessment & Plan     1. Cellulitis of foot  Explained to patient about infection and possibility of foreign body, X-rays without foreign body or rash, advised close follow up in a day. Tylenol/ibuprofen, received Toradol 30 mg IM at the clinic with some relief. Warm  compress to affected area twice daily.  - ketorolac (TORADOL) injection 30 mg  - HYDROcodone-acetaminophen (NORCO) 5-325 MG tablet; Take 1 tablet by mouth every 6 hours as needed for pain  Dispense: 30 tablet; Refill: 0  - sulfamethoxazole-trimethoprim (BACTRIM DS/SEPTRA DS) 800-160 MG tablet; Take 1 tablet by mouth 2 times daily  Dispense: 20 tablet; Refill: 0  - XR Foot Left G/E 3 Views  - Erythrocyte sedimentation rate auto; Future  - CBC with platelets and differential; Future    2. Fever, unspecified fever cause  Due to above.  - ketorolac (TORADOL) injection 30 mg  - HYDROcodone-acetaminophen (NORCO) 5-325 MG tablet; Take 1 tablet by mouth every 6 hours as needed for pain  Dispense: 30 tablet; Refill: 0  - sulfamethoxazole-trimethoprim (BACTRIM DS/SEPTRA DS) 800-160 MG tablet; Take 1 tablet by mouth 2 times daily  Dispense: 20 tablet; Refill: 0  - XR Foot Left G/E 3 Views    3. Need for diphtheria-tetanus-pertussis (Tdap) vaccine  - TDAP, IM (10 - 64 YRS) - Adacel         Return in about 1 day (around 7/18/2019) for recheck cellulitis.    Geeta Adam MD  [facility]

## 2019-07-18 ENCOUNTER — NURSE TRIAGE (OUTPATIENT)
Dept: NURSING | Facility: CLINIC | Age: 49
End: 2019-07-18

## 2019-07-18 NOTE — TELEPHONE ENCOUNTER
Foot infection, left foot.  She is supposed to be seen again today. There are no available appointments at the clinic. We talked about urgent care locations.  I connected her with [facility] to see if they have any openings, as requested. I advised she not drive since she has been taking narcotics. She is aware of that.  Connie Rico RN-[facility]

## 2019-07-19 ENCOUNTER — OFFICE VISIT (OUTPATIENT)
Dept: PEDIATRICS | Facility: CLINIC | Age: 49
End: 2019-07-19
Payer: COMMERCIAL

## 2019-07-19 VITALS
SYSTOLIC BLOOD PRESSURE: 133 MMHG | TEMPERATURE: 99.2 F | DIASTOLIC BLOOD PRESSURE: 85 MMHG | WEIGHT: 188 LBS | OXYGEN SATURATION: 96 % | BODY MASS INDEX: 28.59 KG/M2 | HEART RATE: 90 BPM

## 2019-07-19 DIAGNOSIS — K59.00 CONSTIPATION, UNSPECIFIED CONSTIPATION TYPE: ICD-10-CM

## 2019-07-19 DIAGNOSIS — L03.119 CELLULITIS OF FOOT: Primary | ICD-10-CM

## 2019-07-19 PROCEDURE — 99214 OFFICE O/P EST MOD 30 MIN: CPT | Performed by: FAMILY MEDICINE

## 2019-07-19 RX ORDER — POLYETHYLENE GLYCOL 3350 17 G/17G
1 POWDER, FOR SOLUTION ORAL DAILY
Qty: 10 PACKET | Refills: 0 | Status: ON HOLD | OUTPATIENT
Start: 2019-07-19 | End: 2019-10-22

## 2019-07-19 NOTE — PATIENT INSTRUCTIONS
Patient Education     Constipation (Adult)  Constipation means that you have bowel movements that are less frequent than usual. Stools often become very hard and difficult to pass.  Constipation is very common. At some point in life, it affects almost everyone. Since everyone's bowel habits are different, what is constipation to one person may not be to another. Your healthcare provider may do tests to diagnose constipation. It depends on what he or she finds when evaluating you.    Symptoms of constipation include:    Abdominal pain    Bloating    Vomiting    Painful bowel movements    Itching, swelling, bleeding, or pain around the anus  Causes  Constipation can have many causes. These include:    Diet low in fiber    Too much dairy    Not drinking enough liquids    Lack of exercise or physical activity (especially true for older adults)    Changes in lifestyle or daily routine, including pregnancy, aging, work, and travel    Frequent use or misuse of laxatives    Ignoring the urge to have a bowel movement or delaying it until later    Medicines, such as certain prescription pain medicines, iron supplements, antacids, certain antidepressants, and calcium supplements    Diseases like irritable bowel syndrome, bowel obstructions, stroke, diabetes, thyroid disease, Parkinson disease, hemorrhoids, and colon cancer  Complications  Potential complications of constipation can include:    Hemorrhoids    Rectal bleeding from hemorrhoids or anal fissures (skin tears)    Hernias    Dependency on laxatives    Chronic constipation    Fecal impaction, a severe form of constipation in which a large amount of hard stool is in your rectum that you can't pass    Bowel obstruction or perforation  Home care  All treatment should be done after talking with your healthcare provider. This is especially true if you have another medical problems, are taking prescription medicines, or are an older adult. Treatment most often involves  lifestyle changes. You may also need medicines. Your healthcare provider will tell you which will work best for you. Follow the advice below to help avoid this problem in the future.  Lifestyle changes  These lifestyle changes can help prevent constipation:    Diet. Eat a high-fiber diet, with fresh fruit and vegetables, and reduce dairy intake, meats, and processed foods    Fluids. It's important to get enough fluids each day. Drink plenty of water when you eat more fiber. If you are on diet that limits the amount of fluid you can have, talk about this with your healthcare provider.    Regular exercise. Check with your healthcare provider first.  Medicines  Take any medicines as directed. Some laxatives are safe to use only every now and then. Others can be taken on a regular basis. While laxatives don't cause bowel dependence, they are treating the symptoms. So your constipation may return if you don't make other changes. Talk with your healthcare provider or pharmacist if you have questions.  Prescription pain medicines can cause constipation. If you are taking this kind of medicine, ask your healthcare provider if you should also take a stool softener.  Medicines you may take to treat constipation include:    Fiber supplements    Stool softeners    Laxatives    Enemas    Rectal suppositories  Follow-up care  Follow up with your healthcare provider if symptoms don't get better in the next few days. You may need to have more tests or see a specialist.  Call 911  Call 911 if any of these occur:    Trouble breathing    Stiff, rigid abdomen that is severely painful to touch    Confusion    Fainting or loss of consciousness    Rapid heart rate    Chest pain  When to seek medical advice  Call your healthcare provider right away if any of these occur:    Fever of 100.4 F (38 C) or higher, or as directed by your healthcare provider    Failure to resume normal bowel movements    Pain in your abdomen or back gets  worse    Nausea or vomiting    Swelling in your abdomen    Blood in the stool    Black, tarry stool    Involuntary weight loss    Weakness  Date Last Reviewed: 6/1/2018 2000-2018 [facility]. [address] [place] [address], [place] [address]. All rights reserved. This information is not intended as a substitute for professional medical care. Always follow your healthcare professional's instructions.

## 2019-07-22 ENCOUNTER — OFFICE VISIT (OUTPATIENT)
Dept: PEDIATRICS | Facility: CLINIC | Age: 49
End: 2019-07-22
Payer: COMMERCIAL

## 2019-07-22 VITALS
WEIGHT: 186.9 LBS | SYSTOLIC BLOOD PRESSURE: 122 MMHG | TEMPERATURE: 99.4 F | OXYGEN SATURATION: 99 % | BODY MASS INDEX: 27.68 KG/M2 | DIASTOLIC BLOOD PRESSURE: 82 MMHG | HEART RATE: 98 BPM | HEIGHT: 69 IN

## 2019-07-22 DIAGNOSIS — L03.032 CELLULITIS OF FOURTH TOE OF LEFT FOOT: Primary | ICD-10-CM

## 2019-07-22 PROCEDURE — 99213 OFFICE O/P EST LOW 20 MIN: CPT | Performed by: INTERNAL MEDICINE

## 2019-07-22 RX ORDER — CEPHALEXIN 500 MG/1
500 CAPSULE ORAL 4 TIMES DAILY
Qty: 28 CAPSULE | Refills: 0 | Status: SHIPPED | OUTPATIENT
Start: 2019-07-22 | End: 2019-09-06

## 2019-07-22 ASSESSMENT — PATIENT HEALTH QUESTIONNAIRE - PHQ9
5. POOR APPETITE OR OVEREATING: SEVERAL DAYS
SUM OF ALL RESPONSES TO PHQ QUESTIONS 1-9: 15

## 2019-07-22 ASSESSMENT — ANXIETY QUESTIONNAIRES
1. FEELING NERVOUS, ANXIOUS, OR ON EDGE: NOT AT ALL
6. BECOMING EASILY ANNOYED OR IRRITABLE: SEVERAL DAYS
IF YOU CHECKED OFF ANY PROBLEMS ON THIS QUESTIONNAIRE, HOW DIFFICULT HAVE THESE PROBLEMS MADE IT FOR YOU TO DO YOUR WORK, TAKE CARE OF THINGS AT HOME, OR GET ALONG WITH OTHER PEOPLE: VERY DIFFICULT
7. FEELING AFRAID AS IF SOMETHING AWFUL MIGHT HAPPEN: SEVERAL DAYS
GAD7 TOTAL SCORE: 5
2. NOT BEING ABLE TO STOP OR CONTROL WORRYING: SEVERAL DAYS
5. BEING SO RESTLESS THAT IT IS HARD TO SIT STILL: NOT AT ALL
3. WORRYING TOO MUCH ABOUT DIFFERENT THINGS: SEVERAL DAYS

## 2019-07-22 ASSESSMENT — MIFFLIN-ST. JEOR: SCORE: 1539.21

## 2019-07-22 NOTE — PROGRESS NOTES
Follow up on cellulitis left foot    Zuleyma Reed is a 49 year old female who presents to clinic today for the following health issues:    HPI     Follow up cellulitis left foot.  Seen by Dr. Adam on 07/17/19.  Swelling has gone down a lot.  She can now bend her toes, fever gone.  Bactrim is causing vertigo and tinnitus.  Patient noticing these side effects a half an hour to an hour after taking the medication.    Patient has noticed improvement but still has some tenderness.  Swelling has gone down quite a bit.    Patient Active Problem List   Diagnosis     Bilateral low back pain with left-sided sciatica     Depression with anxiety     Allergic rhinitis     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     Dysthymic disorder     Family history of autoimmune disorder     Generalized anxiety disorder     IUD (intrauterine device) in place     Major depressive disorder, recurrent episode (H)     Primary localized osteoarthrosis, lower leg     Tension type headache     History reviewed. No pertinent surgical history.    Social History     Tobacco Use     Smoking status: Former Smoker     Smokeless tobacco: Never Used   Substance Use Topics     Alcohol use: Yes     Family History   Problem Relation Age of Onset     Lupus Father      Autoimmune Disease Paternal Aunt         ALS     Autoimmune Disease Paternal Aunt         crohn's         Current Outpatient Medications   Medication Sig Dispense Refill     buPROPion (WELLBUTRIN SR) 150 MG 12 hr tablet TAKE 1 TABLET (150 MG) BY MOUTH 2 TIMES DAILY 60 tablet 0     cephALEXin (KEFLEX) 500 MG capsule Take 1 capsule (500 mg) by mouth 4 times daily for 7 days 28 capsule 0     cyclobenzaprine (FLEXERIL) 5 MG tablet May take 1 or 2 tablets 3 times a day as needed for muscle spasm and pain. Sedating. Preferably take at bedtime 90 tablet 0     fluticasone (FLONASE) 50 MCG/ACT spray Spray 1-2 sprays into both nostrils daily 1 Bottle 0     levonorgestrel (MIRENA) 20  "MCG/24HR IUD 1 each by Intrauterine route once       polyethylene glycol (MIRALAX/GLYCOLAX) packet Take 17 g by mouth daily 10 packet 0     Pseudoephedrine-Naproxen Na (SINUS & COLD-D PO)        No Known Allergies      Reviewed and updated as needed this visit by Provider         Review of Systems   ROS COMP: Constitutional, HEENT, cardiovascular, pulmonary, gi and gu systems are negative, except as otherwise noted.      Objective    /82   Pulse 98   Temp 99.4  F (37.4  C) (Temporal)   Ht 1.74 m (5' 8.5\")   Wt 84.8 kg (186 lb 14.4 oz)   SpO2 99%   BMI 28.00 kg/m    Body mass index is 28 kg/m .  Physical Exam   GENERAL: healthy, alert and no distress  SKIN: Left foot examination reveals tenderness at the base of the fourth left foot toe with skin discoloration.  The previous margin of cellulitis were marked and it is clear that the cellulitis has rescinded considerably from the margin.  Edema has resolved.    Diagnostic Test Results:  Labs reviewed in Epic        Assessment & Plan     1.  Cellulitis left foot mostly involving the fourth toe.  Markedly improved.  She is unable to tolerate Septra very well.  She is having quite a bit of side effects including dizziness.  So I switched her to Keflex 500 4 times daily for 7 days.  If not better she will return.  She could return to work.     BMI:   Estimated body mass index is 28 kg/m  as calculated from the following:    Height as of this encounter: 1.74 m (5' 8.5\").    Weight as of this encounter: 84.8 kg (186 lb 14.4 oz).               No follow-ups on file.    Raymond Gardner MD  [facility]      "

## 2019-07-22 NOTE — LETTER
July 22, 2019      Zuleyma Reed  [address]  [place] [address]        To Whom It May Concern:    Zuleyma Reed was seen in our clinic. She may return to work today. No restriction.    Sincerely,        Raymond Gardner MD

## 2019-07-23 ASSESSMENT — ANXIETY QUESTIONNAIRES: GAD7 TOTAL SCORE: 5

## 2019-08-05 ENCOUNTER — TELEPHONE (OUTPATIENT)
Dept: PEDIATRICS | Facility: CLINIC | Age: 49
End: 2019-08-05

## 2019-08-05 ENCOUNTER — OFFICE VISIT (OUTPATIENT)
Dept: PEDIATRICS | Facility: CLINIC | Age: 49
End: 2019-08-05
Payer: COMMERCIAL

## 2019-08-05 VITALS
HEART RATE: 91 BPM | OXYGEN SATURATION: 99 % | SYSTOLIC BLOOD PRESSURE: 139 MMHG | TEMPERATURE: 99 F | DIASTOLIC BLOOD PRESSURE: 91 MMHG

## 2019-08-05 DIAGNOSIS — L03.116 CELLULITIS OF FOOT, LEFT: Primary | ICD-10-CM

## 2019-08-05 PROCEDURE — 96372 THER/PROPH/DIAG INJ SC/IM: CPT | Performed by: INTERNAL MEDICINE

## 2019-08-05 PROCEDURE — 99213 OFFICE O/P EST LOW 20 MIN: CPT | Mod: 25 | Performed by: INTERNAL MEDICINE

## 2019-08-05 RX ORDER — CEFTRIAXONE SODIUM 1 G
1 VIAL (EA) INJECTION ONCE
Status: COMPLETED | OUTPATIENT
Start: 2019-08-05 | End: 2019-08-05

## 2019-08-05 RX ORDER — SULFAMETHOXAZOLE/TRIMETHOPRIM 800-160 MG
1 TABLET ORAL 2 TIMES DAILY
Qty: 20 TABLET | Refills: 0 | Status: SHIPPED | OUTPATIENT
Start: 2019-08-05 | End: 2019-09-06

## 2019-08-05 RX ADMIN — Medication 1 G: at 13:26

## 2019-08-05 NOTE — TELEPHONE ENCOUNTER
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 T- 99.8 today and yesterday and normally runs low. Tired.  It had cleared. She didn't tolerate abx well. She is aware that we're fully booked today.   Chelsea Garcia RN

## 2019-08-05 NOTE — PROGRESS NOTES
Subjective     Zuleyma Reed is a 49 year old female who presents to clinic today for the following health issues:    HPI   Left foot cellulitis    49-year-old lady had left foot cellulitis for which she had x-ray of the foot on 7 5/17/2019.  She was placed on Septra and responded to it well.  Cellulitis completely resolved.  On 8/3/2019 she noticed that the left foot third and fourth toe swelled up and got painful and red.  The skin started peeling and then she noticed advancement of swelling and redness on the dorsal part of the foot.  Now is painful to walk.  No fever or chills.  On her own accord she started taking Septra DS twice daily on Saturday so had 4 doses before she came to see me today.  Over the weekend she was not responding like she thought she would with Septra so she decided to come in.    Patient Active Problem List   Diagnosis     Bilateral low back pain with left-sided sciatica     Depression with anxiety     Allergic rhinitis     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     Dysthymic disorder     Family history of autoimmune disorder     Generalized anxiety disorder     IUD (intrauterine device) in place     Major depressive disorder, recurrent episode (H)     Primary localized osteoarthrosis, lower leg     Tension type headache     No past surgical history on file.    Social History     Tobacco Use     Smoking status: Former Smoker     Smokeless tobacco: Never Used   Substance Use Topics     Alcohol use: Yes     Family History   Problem Relation Age of Onset     Lupus Father      Autoimmune Disease Paternal Aunt         ALS     Autoimmune Disease Paternal Aunt         crohn's         Current Outpatient Medications   Medication Sig Dispense Refill     sulfamethoxazole-trimethoprim (BACTRIM DS/SEPTRA DS) 800-160 MG tablet Take 1 tablet by mouth 2 times daily for 10 days 20 tablet 0     buPROPion (WELLBUTRIN SR) 150 MG 12 hr tablet TAKE 1 TABLET (150 MG) BY MOUTH 2 TIMES DAILY 60  tablet 0     cyclobenzaprine (FLEXERIL) 5 MG tablet May take 1 or 2 tablets 3 times a day as needed for muscle spasm and pain. Sedating. Preferably take at bedtime 90 tablet 0     fluticasone (FLONASE) 50 MCG/ACT spray Spray 1-2 sprays into both nostrils daily 1 Bottle 0     levonorgestrel (MIRENA) 20 MCG/24HR IUD 1 each by Intrauterine route once       polyethylene glycol (MIRALAX/GLYCOLAX) packet Take 17 g by mouth daily 10 packet 0     Pseudoephedrine-Naproxen Na (SINUS & COLD-D PO)        No Known Allergies    Reviewed and updated as needed this visit by Provider         Review of Systems   ROS COMP: Constitutional, HEENT, cardiovascular, pulmonary, GI, , musculoskeletal, neuro, skin, endocrine and psych systems are negative, except as otherwise noted.      Objective    BP (!) 139/91   Pulse 91   Temp 99  F (37.2  C) (Oral)   SpO2 99%   There is no height or weight on file to calculate BMI.  Physical Exam   GENERAL: healthy, alert and no distress  MS: no gross musculoskeletal defects noted, no edema  SKIN: Left foot examination reveals superficial peeling of the epidermal layer of the web between the third and fourth as well as third and fifth toe.  There is swelling of these toes along with swelling of the distal portion of the left foot.  Erythema is noted.  Tenderness is present.  No drainage or break in skin noted.  The ankle and lower leg exams are normal.      Diagnostic Test Results:  Labs reviewed in Epic        Assessment & Plan     1.  Left leg recurrent cellulitis.  Reason for recurrence is unclear.  No obvious break in the skin noted.  Last time she responded well to Septra DS twice daily but this time seems like she is not.  There is no reason to do CBC with differential or blood cultures since she is already been on Septra for the past 2 days.  I decided to treat her with IM ceftriaxone 1 g and continue with Septra DS twice daily.  But advised to get back to me in the next 24 to 48 hours and  "see how she is responding.  Patient agreeable with the plan.    BMI:   Estimated body mass index is 28 kg/m  as calculated from the following:    Height as of 7/22/19: 1.74 m (5' 8.5\").    Weight as of 7/22/19: 84.8 kg (186 lb 14.4 oz).               No follow-ups on file.    Raymond Gardner MD  [facility]      "

## 2019-08-05 NOTE — NURSING NOTE
Clinic Administered Medication Documentation      Injectable Medication Documentation    Patient was given Ceftriaxone Sodium (Rocephin). Prior to medication administration, verified patients identity using patient s name and date of birth. Please see MAR and medication order for additional information. Patient instructed to remain in clinic for 15 minutes and report any adverse reaction to staff immediately .      Was entire vial of medication used? Yes  Vial/Syringe: Single dose vial  Expiration Date:  8/2021  Was this medication supplied by the patient? No

## 2019-08-06 ENCOUNTER — TELEPHONE (OUTPATIENT)
Dept: PEDIATRICS | Facility: CLINIC | Age: 49
End: 2019-08-06

## 2019-08-06 NOTE — TELEPHONE ENCOUNTER
[facility] CLINICAL DOCUMENTATION    Form Documentation Form or Letter Request    Type or form/letter needing completion: [facility]  Provider: Raymond Gardner MD  Has provider seen patient for office visit related to reason for form request? Yes  Date form needed: ASAP  Once completed: Fax form to: [facility] Fax# [phone]    Form and message routed to Dr. Gardner to complete on 08/08/19.    Ann Morales CMA

## 2019-08-09 NOTE — TELEPHONE ENCOUNTER
[employer]ML[employer] form completed by Dr. Gardner.  Original given to patient, copy sent to [facility] for scanning.    Ann Morales CMA

## 2019-08-15 DIAGNOSIS — F41.8 DEPRESSION WITH ANXIETY: ICD-10-CM

## 2019-08-15 RX ORDER — BUPROPION HYDROCHLORIDE 150 MG/1
TABLET, EXTENDED RELEASE ORAL
Qty: 60 TABLET | Refills: 0 | Status: SHIPPED | OUTPATIENT
Start: 2019-08-15 | End: 2019-09-09

## 2019-08-15 NOTE — TELEPHONE ENCOUNTER
LOV- 8/5. Last seen for depression on 6/17 says to [facility] in 2 weeks and saw [facility] on 6/28. Sent Ket. Sent x1.   Chelsea Garcia RN

## 2019-08-20 PROBLEM — M54.42 BILATERAL LOW BACK PAIN WITH LEFT-SIDED SCIATICA: Status: RESOLVED | Noted: 2019-04-18 | Resolved: 2019-08-20

## 2019-08-20 NOTE — PROGRESS NOTES
Patient did not return for follow up treatments.  Goal status and current objective information is therefore unknown.  Discharge from PT services at this time for this episode of treatment. Please see attached documentation under this episode of care for further information including dates of service, start of care date, referring physician, Dx, treatment plan, treatments, etc.    Please contact me with any questions or concerns.    Thank you for your referral.    Chandni Boswell, PT, DPT

## 2019-08-26 ENCOUNTER — PRE VISIT (OUTPATIENT)
Dept: ENDOCRINOLOGY | Facility: CLINIC | Age: 49
End: 2019-08-26

## 2019-08-26 NOTE — TELEPHONE ENCOUNTER
Left message for pt to call back to clinic to go over Pre-Visit questions for upcoming appointment on 9/6/19 with Dr Suárez.   Cheli Yao, CMA

## 2019-08-30 ENCOUNTER — OFFICE VISIT (OUTPATIENT)
Dept: PSYCHOLOGY | Facility: CLINIC | Age: 49
End: 2019-08-30
Payer: COMMERCIAL

## 2019-08-30 DIAGNOSIS — F43.10 PTSD (POST-TRAUMATIC STRESS DISORDER): ICD-10-CM

## 2019-08-30 DIAGNOSIS — F33.1 MODERATE EPISODE OF RECURRENT MAJOR DEPRESSIVE DISORDER (H): Primary | ICD-10-CM

## 2019-08-30 PROCEDURE — 90832 PSYTX W PT 30 MINUTES: CPT | Performed by: SOCIAL WORKER

## 2019-08-30 NOTE — PATIENT INSTRUCTIONS
"  [facility]                                      Patient's Name: Zuleyma Reed  August 30, 2019    SAFETY PLAN:  Step 1: Warning signs / cues (Thoughts, images, mood, situation, behavior) that a crisis may be developing:    Thoughts: \"I don't matter\", \"I'm a burden\", \"I can't do this anymore\" and \"Nothing makes it better\"    Images: no images    Thinking Processes: ruminations (can't stop thinking about my problems): . and racing thoughts    Mood: worsening depression, hopelessness, helplessness and disinhibited (not caring about things or consequences)    Behaviors: isolating/withdrawing , not taking care of myself, not taking care of my responsibilities and not sleeping enough, hard to wake up    Situations: health, relationship with daughter,  reminder of feeling lonely     Feels heavy and  foggy  Step 2: Coping strategies - Things I can do to take my mind off of my problems without contacting another person (relaxation technique, physical activity):    Distress Tolerance Strategies:  clean, short walks,cooking, reading, watching TV, use 5 senses    Physical Activities: walk with pets, treadmill, and bike     Focus on helpful thoughts:  Things can get better, they have been, and this too shall pass. Depression can be treated, there is hope!  Step 3: People and social settings that provide distraction:   Name: Jamin Phone: in phone   Name: Mom and Dad Phone: in phone    Social settings can help! Bear, green spaces   Step 4: Remind myself of people and things that are important to me and worth living for:    Jamin, pets, parents  Future job prospects: the co-workers, the work environment.    Step 5: When I am in crisis, I can ask these people to help me use my safety plan:   Name: Jamin Phone: in person     Step 6: Making the environment safe:     If you consider a plan, try to put a barrrier between yourself and the plan.  Step 7: Professionals or agencies I can contact during a " crisis:    Suicide Prevention Lifeline: 5-449-213-TALK (7951)    Crisis Text Line Service: Text   [place]  to 187807.  Local Crisis Services: 204.604.4825    Call 911 or go to my nearest emergency department.   I helped develop this safety plan and agree to use it when needed.  I have been given a copy of this plan.      Patient signature: _______________________________________________________________  Today s date:  August 30, 2019  Adapted from Safety Plan Template 2008 Shruthi Daly and George Garcia is reprinted with the express permission of the authors.  No portion of the Safety Plan Template may be reproduced without the express, written permission.  You can contact the authors at bhs@[email or web address] or [email or web address].

## 2019-08-30 NOTE — PROGRESS NOTES
[facility]  August 30, 2019      Behavioral Health Clinician Progress Note    Patient Name: Zuleyma Reed           Service Type:  Individual      Service Location:   Face to Face in Clinic     Session Start Time: 4:10 pm  Session End Time: 4:47 pm      Session Length: 16 - 37      Attendees: Patient    Visit Activities (Refresh list every visit): [facility] Only    Diagnostic Assessment Date: 6/28/19  Treatment Plan Review Date: deferred to complete safety plan  See Flowsheets for today's PHQ-9 and FAVIAN-7 results  Previous PHQ-9:   PHQ-9 SCORE 6/17/2019 7/1/2019 7/22/2019   PHQ-9 Total Score 19 16 15     Previous FAVIAN-7:   FAVIAN-7 SCORE 6/17/2019 7/1/2019 7/22/2019   Total Score 11 5 5       OCTAVIA LEVEL:  OCTAVIA Score (Last Two) 5/23/2019   OCTAVIA Raw Score 31   Activation Score 59.3   OCTAVIA Level 3       DATA  Extended Session (60+ minutes): No  Interactive Complexity: No  Crisis: No  [facility] Patient: No    Treatment Objective(s) Addressed in This Session:  Target Behavior(s): depression    Depressed Mood: Decrease thoughts that you'd be better off dead or of suicide / self-harm    Current Stressors / Issues:  Patient reported concerns about worsening depression. Patient discussed recent cellulitis, worening stress at work, and feeling like she is not needed by her daughter. Patient shared that she does not feel like she has a purpose, but stated that it changed this past week due to a friend staying with her. She reported that she laughed and enjoyed being able to cook a meal together.  She stated that she is not sure how long he will be staying, but reported that it is for the foreseeable future.    Patient stated that she has been thinking of suicide everyday, the amount of time varies in which she spends thinking about suicide. She reported that she has not had any plans, has not taken any steps to harm self. Patient stated that she has not tried to harm herself because of the impact it would have on others  including her daughter, parents, and her pets.     Patient displayed future orientated thinking as she discussed [place] the [place] this weekend. She reported that she is looking forward to being with her parents and being outside. Patient stated that when she last went to the lake she was able to read which she enjoyed. Patient agreed to complete a safety plan, stated comfort with contacting crisis resources. Patient shared belief that she feels safe going home and then to the lake, declined feeling need for hospitalization. Patient reported that she is looking for opportunities to change jobs. She stated that she wants to treat her depression since she knows that life can be better if her depression improves. Patient stated that she has an appointment next week with a specialist that she is hoping to provide her answers about her health.     Progress on Treatment Objective(s) / Homework:  Worsening - CONTEMPLATION (Considering change and yet undecided); Intervened by assessing the negative and positive thinking (ambivalence) about behavior change    Motivational Interviewing    MI Intervention: Expressed Empathy/Understanding     Change Talk Expressed by the Patient: Desire to change    Provider Response to Change Talk: E - Evoked more info from patient about behavior change, A - Affirmed patient's thoughts, decisions, or attempts at behavior change, R - Reflected patient's change talk and S - Summarized patient's change talk statements    Also provided psychoeducation about behavioral health condition, symptoms, and treatment options    Care Plan review completed: No    Medication Review:  No changes to current psychiatric medication(s)    Medication Compliance:  Yes    Changes in Health Issues:   None reported    Chemical Use Review:   Substance Use: Chemical use reviewed, no active concerns identified      Tobacco Use: No current tobacco use.      Assessment: Current Emotional / Mental Status (status of  significant symptoms):  Risk status (Self / Other harm or suicidal ideation)  Patient has had a history of suicide attempts: 1990 and 2009 and denies a history of self-injurious behavior, homicidal ideation, homicidal behavior and and other safety concerns  Patient denies current fears or concerns for personal safety.  Patient reports the following current or recent suicidal ideation or behaviors: See above.  Patient denies current or recent homicidal ideation or behaviors.  Patient denies current or recent self injurious behavior or ideation.  Patient denies other safety concerns.  A safety and risk management plan has been developed including: Patient consented to co-developed safety plan.  A safety and risk management plan was completed.  Patient agreed to use safety plan should any safety concerns arise.  A copy was given to the patient.    Appearance:   Appropriate   Eye Contact:   Good   Psychomotor Behavior: Normal   Attitude:   Cooperative   Orientation:   All  Speech   Rate / Production: Normal    Volume:  Soft   Mood:    Anxious  Depressed   Affect:    Flat   Thought Content:  Clear   Thought Form:  Coherent  Logical   Insight:    Fair     Diagnoses:  1. Moderate episode of recurrent major depressive disorder (H)    2. PTSD (post-traumatic stress disorder)        Collateral Reports Completed:  Routed note to PCP    Plan: (Homework, other):  Patient was given information about behavioral services and encouraged to schedule a follow up appointment with the clinic [facility] in 2 weeks.  She was also given a copy of her safety plan.  Patient to return phone call to schedule appointment with psychiatry for medication management. [facility] to re-refer patient to [facility] in either [place] or [place]. [facility] to give PCP copy of [facility] paperwork as requested by patient. CD Recommendations: No indications of CD issues.  FREDDY Ojeda, [facility]      ______________________________________________________________________      Cheli  Mikaela, [facility]  August 30, 2019

## 2019-08-30 NOTE — Clinical Note
Previously scheduled to see Joyce MOE, had to cancel.  Can someone reach out to her to help her reschedule either with Joyce or be placed on the [facility] wait list? Thanks,Cheli

## 2019-09-03 ENCOUNTER — TELEPHONE (OUTPATIENT)
Dept: PEDIATRICS | Facility: CLINIC | Age: 49
End: 2019-09-03

## 2019-09-03 NOTE — TELEPHONE ENCOUNTER
Please call as needs appointment for this and also need to check when she is scheduled to see psychiatry

## 2019-09-03 NOTE — TELEPHONE ENCOUNTER
[facility] CLINICAL DOCUMENTATION    Form Documentation Form or Letter Request    Type or form/letter needing completion: [facility]  Provider: BERTRAM Grimm CNP  Has provider seen patient for office visit related to reason for form request? Does not know  Date form needed: not stated  Once completed: Patient will  at . and faxed to [phone]    MICHAEL Andrews

## 2019-09-04 NOTE — TELEPHONE ENCOUNTER
Last Read in MyChart  9/3/2019  2:16 PM by Zuleyma Reed    No future appointments scheduled at this time.  Mirian ANGULO, CMA

## 2019-09-06 ENCOUNTER — OFFICE VISIT (OUTPATIENT)
Dept: PSYCHOLOGY | Facility: CLINIC | Age: 49
End: 2019-09-06
Payer: COMMERCIAL

## 2019-09-06 ENCOUNTER — MYC MEDICAL ADVICE (OUTPATIENT)
Dept: PEDIATRICS | Facility: CLINIC | Age: 49
End: 2019-09-06

## 2019-09-06 ENCOUNTER — OFFICE VISIT (OUTPATIENT)
Dept: ENDOCRINOLOGY | Facility: CLINIC | Age: 49
End: 2019-09-06
Attending: NURSE PRACTITIONER
Payer: COMMERCIAL

## 2019-09-06 ENCOUNTER — OFFICE VISIT (OUTPATIENT)
Dept: PEDIATRICS | Facility: CLINIC | Age: 49
End: 2019-09-06
Payer: COMMERCIAL

## 2019-09-06 ENCOUNTER — DOCUMENTATION ONLY (OUTPATIENT)
Dept: PSYCHOLOGY | Facility: CLINIC | Age: 49
End: 2019-09-06

## 2019-09-06 VITALS
SYSTOLIC BLOOD PRESSURE: 156 MMHG | DIASTOLIC BLOOD PRESSURE: 98 MMHG | OXYGEN SATURATION: 98 % | WEIGHT: 185 LBS | BODY MASS INDEX: 27.72 KG/M2 | HEART RATE: 73 BPM

## 2019-09-06 VITALS
BODY MASS INDEX: 27.81 KG/M2 | TEMPERATURE: 99.4 F | OXYGEN SATURATION: 96 % | SYSTOLIC BLOOD PRESSURE: 130 MMHG | DIASTOLIC BLOOD PRESSURE: 80 MMHG | WEIGHT: 185.6 LBS | HEART RATE: 94 BPM

## 2019-09-06 DIAGNOSIS — F33.1 MODERATE EPISODE OF RECURRENT MAJOR DEPRESSIVE DISORDER (H): Primary | ICD-10-CM

## 2019-09-06 DIAGNOSIS — M54.2 NECK PAIN: Primary | ICD-10-CM

## 2019-09-06 DIAGNOSIS — F43.10 PTSD (POST-TRAUMATIC STRESS DISORDER): ICD-10-CM

## 2019-09-06 DIAGNOSIS — F41.8 DEPRESSION WITH ANXIETY: Primary | ICD-10-CM

## 2019-09-06 PROCEDURE — 99214 OFFICE O/P EST MOD 30 MIN: CPT | Performed by: NURSE PRACTITIONER

## 2019-09-06 PROCEDURE — 90839 PSYTX CRISIS INITIAL 60 MIN: CPT | Performed by: SOCIAL WORKER

## 2019-09-06 PROCEDURE — 99214 OFFICE O/P EST MOD 30 MIN: CPT | Performed by: INTERNAL MEDICINE

## 2019-09-06 RX ORDER — HYDROXYZINE HYDROCHLORIDE 25 MG/1
25-50 TABLET, FILM COATED ORAL EVERY 8 HOURS PRN
Qty: 30 TABLET | Refills: 1 | Status: ON HOLD | OUTPATIENT
Start: 2019-09-06 | End: 2019-10-22

## 2019-09-06 ASSESSMENT — PATIENT HEALTH QUESTIONNAIRE - PHQ9
SUM OF ALL RESPONSES TO PHQ QUESTIONS 1-9: 19
5. POOR APPETITE OR OVEREATING: MORE THAN HALF THE DAYS

## 2019-09-06 ASSESSMENT — ANXIETY QUESTIONNAIRES
2. NOT BEING ABLE TO STOP OR CONTROL WORRYING: NEARLY EVERY DAY
IF YOU CHECKED OFF ANY PROBLEMS ON THIS QUESTIONNAIRE, HOW DIFFICULT HAVE THESE PROBLEMS MADE IT FOR YOU TO DO YOUR WORK, TAKE CARE OF THINGS AT HOME, OR GET ALONG WITH OTHER PEOPLE: EXTREMELY DIFFICULT
7. FEELING AFRAID AS IF SOMETHING AWFUL MIGHT HAPPEN: MORE THAN HALF THE DAYS
5. BEING SO RESTLESS THAT IT IS HARD TO SIT STILL: NOT AT ALL
3. WORRYING TOO MUCH ABOUT DIFFERENT THINGS: NEARLY EVERY DAY
1. FEELING NERVOUS, ANXIOUS, OR ON EDGE: MORE THAN HALF THE DAYS
GAD7 TOTAL SCORE: 14
6. BECOMING EASILY ANNOYED OR IRRITABLE: MORE THAN HALF THE DAYS

## 2019-09-06 NOTE — TELEPHONE ENCOUNTER
Routing to Cheli MONTENEGRO for review.    Danna Gu, RN, BSN, PHN  [facility]

## 2019-09-06 NOTE — LETTER
9/6/2019         RE: Zuleyma Reed  [address]  [place] [address]        Dear Colleague,    Thank you for referring your patient, Zuleyma Reed, to the [facility]. Please see a copy of my visit note below.    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   Does not need follow-up imaging.     Prominent right submandibular gland.   No stones felt on bimanual palpation.   US neck ordered.     Delma Suárez MD  [id number]  Endocrinology Service      Patient Instructions   Head and neck ultrasound this week.,     Orders Placed This Encounter   Procedures     US head neck soft tissue      ====================================    HPI:   Zuleyma Reed is a 49 year old female who is seen for initial consultation.   she  has a past medical history of Depression with anxiety..   -- she had noted a prominence in the right side of the neck.   -- sometimes she had pain over the area radiating to the jaw and lower neck.   -- no change in size.   -- us showed colloidal cyst.   -- however, the area she points out is higher than the thyroid location and is in the submandibular  Area.   -- occasionally, she does have difficulty swallowing her saliva but no problems with either solids or liquids.       Symptoms Details   Temp intolerance No    Palpitations No, except during anxiety   SOB No    Appetite No change    Weight changes No    Change in BM No    Diet No change   Exercise No, difficulty after knee surgery    Energy Levels Good    Sleep No change    Mental status change No change    Skin or hair changes No    Extremity Some edema.    Menses        Compressive  symptoms No dysphagia, dysphonia.    Radiation exposure.            Relevant History Details   PMH  Autoimmune disease no  Neck pain as above       PSH Thyroid surgery ? No        Family history of thyroid / AI disease Yes.   Dad: Lupus, prostate cancer.   Aunt: Stiff person syndrome,. ALS  Other aunt with crohns        Family history of thyroid nodule  No      Other ROS:     No eye symptoms  No headache, change in vision, loss of peripheral vision  Complete ROS that were obtained were negative.     Past Medical History:   Diagnosis Date     Depression with anxiety      No past surgical history on file.  Family History   Problem Relation Age of Onset     Lupus Father      Autoimmune Disease Paternal Aunt         ALS     Autoimmune Disease Paternal Aunt         crohn's     Social History     Socioeconomic History     Marital status: Single     Spouse name: Not on file     Number of children: Not on file     Years of education: Not on file     Highest education level: Not on file   Occupational History     Not on file   Social Needs     Financial resource strain: Not on file     Food insecurity:     Worry: Not on file     Inability: Not on file     Transportation needs:     Medical: Not on file     Non-medical: Not on file   Tobacco Use     Smoking status: Former Smoker     Smokeless tobacco: Never Used   Substance and Sexual Activity     Alcohol use: Yes     Drug use: No     Sexual activity: Not Currently     Partners: Male   Lifestyle     Physical activity:     Days per week: Not on file     Minutes per session: Not on file     Stress: Not on file   Relationships     Social connections:     Talks on phone: Not on file     Gets together: Not on file     Attends [employer] service: Not on file     Active member of club or organization: Not on file     Attends meetings of clubs or organizations: Not on file     Relationship status: Not on file     Intimate partner violence:     Fear of current or ex partner: Not on file      Emotionally abused: Not on file     Physically abused: Not on file     Forced sexual activity: Not on file   Other Topics Concern     Not on file   Social History Narrative     Not on file      No Known Allergies  Current Outpatient Medications   Medication     buPROPion (WELLBUTRIN SR) 150 MG 12 hr tablet     cyclobenzaprine (FLEXERIL) 5 MG tablet     levonorgestrel (MIRENA) 20 MCG/24HR IUD     fluticasone (FLONASE) 50 MCG/ACT spray     hydrOXYzine (ATARAX) 25 MG tablet     polyethylene glycol (MIRALAX/GLYCOLAX) packet     Pseudoephedrine-Naproxen Na (SINUS & COLD-D PO)     No current facility-administered medications for this visit.            Exam:  BP (!) 156/98 (BP Location: Left arm, Patient Position: Sitting, Cuff Size: Adult Regular)   Pulse 73   Wt 83.9 kg (185 lb)   SpO2 98%   BMI 27.72 kg/m      Constitutional: tearful at times, in a stressful situation today.   Head: Normocephalic.   Neck: Prominent submandibular gland in the right. . Thyroid symmetric, normal size, Carotids without bruits.  ENT: No throat congestion, no neck nodes or sinus tenderness  Cardiovascular: regular rhythm, no tachycardia  Respiratory: Lungs clear  Gastrointestinal: Abdomen soft, non-tender. BS normal. No striae  Musculoskeletal: gait normal and normal muscle tone  Neurologic: Normal speech, reflexes normal.   Psychiatric: mentation appears normal       TSH   Date Value Ref Range Status   06/26/2019 1.87 0.40 - 4.00 mU/L Final       No results found for: T4]    CBC RESULTS:   Recent Labs   Lab Test 06/26/19  0701   WBC 8.2   RBC 5.16   HGB 15.7   HCT 46.4   MCV 90   MCH 30.4   MCHC 33.8   RDW 12.7        Recent Labs   Lab Test 06/26/19  0701 05/11/18  0251    141   POTASSIUM 4.1 3.7   CHLORIDE 109 109   CO2 28 25   ANIONGAP 5 7   * 90   BUN 10 7   CR 0.92 0.85   SHANA 8.9 8.6     I reviewed the images with the patient.       Again, thank you for allowing me to participate in the care of your patient.         Sincerely,        Delma Suárez MD

## 2019-09-06 NOTE — NURSING NOTE
"Chief Complaint   Patient presents with     Depression     Anxiety     Forms     FMLA paperwork       Initial /80 (BP Location: Right arm, Patient Position: Sitting, Cuff Size: Adult Regular)   Pulse 94   Temp 99.4  F (37.4  C) (Oral)   Wt 84.2 kg (185 lb 9.6 oz)   SpO2 96%   Breastfeeding? No   BMI 27.81 kg/m   Estimated body mass index is 27.81 kg/m  as calculated from the following:    Height as of 7/22/19: 1.74 m (5' 8.5\").    Weight as of this encounter: 84.2 kg (185 lb 9.6 oz).  Medication Reconciliation: complete      MICHAEL Andrews      "

## 2019-09-06 NOTE — PROGRESS NOTES
[facility] was called to come speak to patient who was visibly distressed and found under a table sobbing, running her hands through her hair. She was observed using her nails to scratch her scalp, but was responsive to requests to use her fingertips instead of finger nails. Patient has worked with FREDDY Ojeda who came to speak with the patient. We consulted together with the patient's PCP, BERTRAM Grimm CNP. The patient agreed to a plan to see her PCP this afternoon, attend her endocrinology appointment, and attend a day treatment program. The patient has a safety plan in place that she agrees to continue to utilize over the weekend. She denied having suicidal ideation at this time. Referral to day treatment was placed.

## 2019-09-06 NOTE — PROGRESS NOTES
"[facility]  September 6, 2019      Behavioral Health Clinician Progress Note    Patient Name: Zuleyma Reed           Service Type:  Individual      Service Location:   Face to Face in Clinic     Session Start Time: 11:58 am  Session End Time: 12:45 pm      Session Length: 38 - 52      Attendees: Patient    Visit Activities (Refresh list every visit): [facility] Only    Diagnostic Assessment Date: 6/28/19  Treatment Plan Review Date: deferred to complete safety plan  See Flowsheets for today's PHQ-9 and FAVIAN-7 results  Previous PHQ-9:   PHQ-9 SCORE 7/1/2019 7/22/2019 9/3/2019   PHQ-9 Total Score MyChart - - 19 (Moderately severe depression)   PHQ-9 Total Score 16 15 19     Previous FAVIAN-7:   FAVIAN-7 SCORE 6/17/2019 7/1/2019 7/22/2019   Total Score 11 5 5       OCTAVIA LEVEL:  OCTAVIA Score (Last Two) 5/23/2019   OCTAVIA Raw Score 31   Activation Score 59.3   OCTAVIA Level 3       DATA  Extended Session (60+ minutes): No  Interactive Complexity: No  Crisis: Yes, visit entailed Crisis Management / Stabilization requiring urgent assessment and history of the crisis state, mental status exam and disposition  formerly [facility] Patient: No    Treatment Objective(s) Addressed in This Session:  Target Behavior(s): depression    Depressed Mood: Decrease thoughts that you'd be better off dead or of suicide / self-harm    Current Stressors / Issues:  Patient sent message to PCP earlier in day stating that \"she does not see a reason to go on\".  Phone outreach completed by this writer, with no response.  Writer notified that patient was in the building, under a table. Patient appeared distressed as evidenced by disheveled hair and sobbing. Per other provider , Ramiro Jackson PsyD, LP, patient was found to be scratching scalp with fingernails. Patient was receptive to moving out from under her desk and meeting with this writer in a private location.     Patient reported that she was working, but then suddenly was unable to continue to work. She stated " "that is when she went to the second floor of her work where this writer found her. She stated that she is \"tired of feeling this way\". Patient reported ongoing decompensation of symptoms since last office visit one week ago.  Patient stated that her friend who had been staying with her left without notification, she is at risk for losing her job, and she did not have a relaxing weekend last weekend with her parents. She reported that she cannot sleep, and has been late to work everyday. Patient reported that she cannot focus on her work, and feels like she cannot do anything. Patient expressed frustration with ongoing depressive symptoms, but stated that she wants to feel better.    Patient stated that she reached out to the clinic because she wants help. She reported that she wants to attend her appointment this afternoon with endocrinology to learn more about the cyst on her thyroid.     Patient stated that she does want to keep feeling this way, and confirmed earlier statement that she does not see a reason to go on. She reported that she does not see a reason to go on if symptoms do not improve. Patient denied wanting to kill herself, denied having a plan, denied intent. Patient continues to state that she could not do that to her parents despite strained relationship. Patient confirmed that she continues to have her dog.  Patient stated that she still has her safety plan that was created last week with this writer. She reported that she \"wants to\" reach out to crisis services if she begins to feel suicidal with a plan and intent over the weekend.     Patient said \"yes\" immediately when [facility] asked about interest in higher level of mental health care. Patient denied belief that hospitalization is needed at this time. She reported that she has interest in the day treatment or PHP program. Patient stated that she wants to meet with her PCP in order to have [facility] paperwork completed.     Progress on Treatment " Objective(s) / Homework:  Worsening - CONTEMPLATION (Considering change and yet undecided); Intervened by assessing the negative and positive thinking (ambivalence) about behavior change    Motivational Interviewing    MI Intervention: Expressed Empathy/Understanding     Change Talk Expressed by the Patient: Desire to change    Provider Response to Change Talk: E - Evoked more info from patient about behavior change, A - Affirmed patient's thoughts, decisions, or attempts at behavior change, R - Reflected patient's change talk and S - Summarized patient's change talk statements    Also provided psychoeducation about behavioral health condition, symptoms, and treatment options    Care Plan review completed: No    Medication Review:  No changes to current psychiatric medication(s)    Medication Compliance:  Yes    Changes in Health Issues:   None reported    Chemical Use Review:   Substance Use: Chemical use reviewed, no active concerns identified      Tobacco Use: No current tobacco use.      Assessment: Current Emotional / Mental Status (status of significant symptoms):  Risk status (Self / Other harm or suicidal ideation)  Patient has had a history of suicide attempts: 1990 and 2009 and denies a history of self-injurious behavior, homicidal ideation, homicidal behavior and and other safety concerns  Patient denies current fears or concerns for personal safety.  Patient reports the following current or recent suicidal ideation or behaviors: See above.  Patient denies current or recent homicidal ideation or behaviors.  Patient denies current or recent self injurious behavior or ideation.  Patient denies other safety concerns.  A safety and risk management plan has been developed including: Patient consented to co-developed safety plan.  A safety and risk management plan was completed.  Patient agreed to use safety plan should any safety concerns arise.  A copy was given to the patient.     [facility] consulted with Ramiro  Ora Jackson LP and BERTRAM Ruff, CNP. Increase in risk factors due to friend leaving and at risk for losing job.  Patient is now at home by herself, but continues to have her dog. She continues to deny active SI with plan and intent. Patient continues to contract for safety, agrees to use safety plan, agrees to [facility] follow up on 9/9 and displayed help seeking and future orientated thinking.   Discussed plan of care, and all in agreement of recommendation for higher level of outpatient care, either day treatment or PHP depending on outcome of assessment center recommendation.     Appearance:   Disheveled   Eye Contact:   Poor  Psychomotor Behavior: Normal   Attitude:   Cooperative   Orientation:   All  Speech   Rate / Production: Slow    Volume:  Soft   Mood:    Anxious  Depressed   Affect:    Blunted   Thought Content:  Clear   Thought Form:  Coherent  Logical   Insight:    Fair     Diagnoses:  1. Moderate episode of recurrent major depressive disorder (H)    2. PTSD (post-traumatic stress disorder)        Collateral Reports Completed:  Communicated with: PCP     Plan: (Homework, other):  Patient was given information about behavioral services and encouraged to schedule a follow up appointment with the clinic [facility] in 1 week.  Safety plan reviewed. Patient to meet with PCP later in afternoon on 9/6 to discuss FMLA.  Patient referred to day treatment or PHP. [facility] spoke with scheduling team with referral information. Referral placed by Dr. Ramiro Jackson PsyD, LP. Patient has already been referred to [facility] psychiatry, with appointment scheduled in October.  CD Recommendations: No indications of CD issues.  FREDDY Ojeda, [facility]      ______________________________________________________________________      FREDDY Ojeda  August 30, 2019

## 2019-09-06 NOTE — NURSING NOTE
Zuleyma Reed's goals for this visit include:   Chief Complaint   Patient presents with     Consult     Thyroid Disease     She requests these members of her care team be copied on today's visit information: Yes    PCP: Hannah Parker    Referring Provider:  Hannah Parker, APRN CNP  [address]  [place] [address]    BP (!) 156/98 (BP Location: Left arm, Patient Position: Sitting, Cuff Size: Adult Regular)   Pulse 73   Wt 83.9 kg (185 lb)   SpO2 98%   BMI 27.72 kg/m      Do you need any medication refills at today's visit? No

## 2019-09-06 NOTE — PATIENT INSTRUCTIONS
PLAN:   1.   Symptomatic therapy suggested: will try hydroxyzine as needed for panic attacks.  2.  Orders Placed This Encounter   Medications     hydrOXYzine (ATARAX) 25 MG tablet     Sig: Take 1-2 tablets (25-50 mg) by mouth every 8 hours as needed for anxiety     Dispense:  30 tablet     Refill:  1     3. Patient needs to follow up in if no improvement,or sooner if worsening of symptoms or other symptoms develop.  CONSULTATION/REFERRAL to Pyschiatry

## 2019-09-06 NOTE — PROGRESS NOTES
Subjective     Zuleyma Reed is a 49 year old female who presents to clinic today for the following health issues:    HPI     1. FMLA paper work    Depression and Anxiety Follow-Up    How are you doing with your depression since your last visit? Worsened     How are you doing with your anxiety since your last visit?  Worsened     Are you having other symptoms that might be associated with depression or anxiety? Yes:  panic attacks, heart racing, headaches    Have you had a significant life event? No     Do you have any concerns with your use of alcohol or other drugs? No  Is going to be called for assessment from [place] for possible partial hospitalization program for depression and anxiety   Has appointment with psychiatry October 22 at [facility] with psychiatry after being on a waiting list   At first  Was thinking intermittent and now thinking needs more of an extended time off   Has been a few minutes late almost everyday and is at risk of losing her jobs  Also missed a couple days of work due to the anxiety  Is presently on Wellbutrin  Is needing FMLA in order to work on this more intensively  Has not been sleeping and her mind is racing       Social History     Tobacco Use     Smoking status: Former Smoker     Smokeless tobacco: Never Used   Substance Use Topics     Alcohol use: Yes     Drug use: No     PHQ-9 SCORE 9/3/2019 9/6/2019 9/19/2019   PHQ-9 Total Score MyChart 19 (Moderately severe depression) - -   PHQ-9 Total Score 19 19 22       FAVIAN-7 SCORE 7/1/2019 7/22/2019 9/6/2019   Total Score 5 5 14       [facility] Follow-up to PHQ 9/3/2019 9/6/2019 9/19/2019   PHQ-9 9. Suicide Ideation past 2 weeks More than half the days More than half the days More than half the days   Thoughts of suicide or self harm in past 2 weeks Yes - -   Thoughts of suicide or self harm in past 2 weeks Yes - -   PHQ-9 Self harm plan? Yes - -   PHQ-9 Self harm action? No - -   PHQ-9 Safety concerns? No - -   PHQ-9 Self harm plan? Yes - -    PHQ-9 Self harm action? No - -   PHQ-9 Safety concerns? No - -       In the past two weeks have you had thoughts of suicide or self-harm?  Yes  In the past two weeks have you thought of a plan or intent to harm yourself? Yes  In the past two weeks have you acted on these thoughts in any way?  No.    Do you have concerns about your personal safety or the safety of others?   No    Suicide Assessment Five-step Evaluation and Treatment (SAFE-T)      How many servings of fruits and vegetables do you eat daily?  2-3 varies on the day    On average, how many sweetened beverages do you drink each day (soda, juice, sweet tea, etc)?   1    How many days per week do you miss taking your medication? 0            Patient Active Problem List   Diagnosis     Depression with anxiety     Allergic rhinitis     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     Dysthymic disorder     Family history of autoimmune disorder     Generalized anxiety disorder     IUD (intrauterine device) in place     Major depressive disorder, recurrent episode (H)     Primary localized osteoarthrosis, lower leg     Tension type headache     History reviewed. No pertinent surgical history.    Social History     Tobacco Use     Smoking status: Former Smoker     Smokeless tobacco: Never Used   Substance Use Topics     Alcohol use: Yes     Family History   Problem Relation Age of Onset     Lupus Father      Autoimmune Disease Paternal Aunt         ALS     Autoimmune Disease Paternal Aunt         crohn's         Current Outpatient Medications   Medication Sig Dispense Refill     buPROPion (WELLBUTRIN SR) 150 MG 12 hr tablet TAKE 1 TABLET BY MOUTH TWICE A DAY 60 tablet 0     cyclobenzaprine (FLEXERIL) 5 MG tablet May take 1 or 2 tablets 3 times a day as needed for muscle spasm and pain. Sedating. Preferably take at bedtime 90 tablet 0     levonorgestrel (MIRENA) 20 MCG/24HR IUD 1 each by Intrauterine route once       Pseudoephedrine-Naproxen Na (SINUS &  COLD-D PO)        fluticasone (FLONASE) 50 MCG/ACT spray Spray 1-2 sprays into both nostrils daily (Patient not taking: Reported on 9/6/2019) 1 Bottle 0     polyethylene glycol (MIRALAX/GLYCOLAX) packet Take 17 g by mouth daily (Patient not taking: Reported on 9/6/2019) 10 packet 0     No Known Allergies  BP Readings from Last 3 Encounters:   09/06/19 130/80   08/05/19 (!) 139/91   07/22/19 122/82    Wt Readings from Last 3 Encounters:   09/06/19 84.2 kg (185 lb 9.6 oz)   07/22/19 84.8 kg (186 lb 14.4 oz)   07/19/19 85.3 kg (188 lb)            Reviewed and updated as needed this visit by Provider         Review of Systems   ROS COMP: Constitutional, HEENT, cardiovascular, pulmonary, gi and gu systems are negative, except as otherwise noted.      Objective    /80 (BP Location: Right arm, Patient Position: Sitting, Cuff Size: Adult Regular)   Pulse 94   Temp 99.4  F (37.4  C) (Oral)   Wt 84.2 kg (185 lb 9.6 oz)   SpO2 96%   Breastfeeding? No   BMI 27.81 kg/m    Body mass index is 27.81 kg/m .  Physical Exam   GENERAL APPEARANCE: alert and active  NECK: .normal and supple  RESP: lungs clear to auscultation - no rales, rhonchi or wheezes  CV: regular rates and rhythm  MS: extremities normal- no gross deformities noted  SKIN: no suspicious lesions or rashes  NEURO: Normal strength and tone, mentation intact and speech normal  PSYCH: mentation appears normal, patient appearance--, affect flat, anxious, crying, fatigued and worried  MENTAL STATUS EXAM:  Appearance/Behavior: No apparent distress, Casually groomed and Dressed appropriately for weather  Speech: Normal  Mood/Affect: depressed affect and anxiety  Insight: Adequate    Diagnostic Test Results:  Labs reviewed in Epic        Assessment & Plan     Zuleyma was seen today for depression, anxiety and forms.    Diagnoses and all orders for this visit:    Depression with anxiety  -     hydrOXYzine (ATARAX) 25 MG tablet; Take 1-2 tablets (25-50 mg) by mouth every  8 hours as needed for anxiety     Reviewed concept of depression as function of biochemical imbalance of neurotransmitters/rationale for treatment.  Risks and benefits of medication(s) reviewed with patient.  Questions answered.  Counseling advised  Refer to psychiatrist  Patient instructed to call for significant side effects medications or problems  Patient advised immediate presentation to hospital for suicidal thought, etc.  No-harm verbal contract agreed upon  Initiated consultation with Cheli Al [facility], Behavioral Health Clinician for mental health counseling.   [place] paperwork completed today as well          See Patient Instructions  Patient Instructions     PLAN:   1.   Symptomatic therapy suggested: will try hydroxyzine as needed for panic attacks.  2.  Orders Placed This Encounter   Medications     hydrOXYzine (ATARAX) 25 MG tablet     Sig: Take 1-2 tablets (25-50 mg) by mouth every 8 hours as needed for anxiety     Dispense:  30 tablet     Refill:  1     3. Patient needs to follow up in if no improvement,or sooner if worsening of symptoms or other symptoms develop.  CONSULTATION/REFERRAL to Pyschiatry  30 min spent in direct face to face time with this pt, greater than 50% in counseling and coordination of care.      No follow-ups on file.    BERTRAM Gilmore CNP  [facility]

## 2019-09-06 NOTE — PROGRESS NOTES
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   Does not need follow-up imaging.     Prominent right submandibular gland.   No stones felt on bimanual palpation.   US neck ordered.     Delma Suárez MD  [id number]  Endocrinology Service      Patient Instructions   Head and neck ultrasound this week.,     Orders Placed This Encounter   Procedures     US head neck soft tissue      ====================================    HPI:   Zuleyma Reed is a 49 year old female who is seen for initial consultation.   she  has a past medical history of Depression with anxiety..   -- she had noted a prominence in the right side of the neck.   -- sometimes she had pain over the area radiating to the jaw and lower neck.   -- no change in size.   -- us showed colloidal cyst.   -- however, the area she points out is higher than the thyroid location and is in the submandibular  Area.   -- occasionally, she does have difficulty swallowing her saliva but no problems with either solids or liquids.       Symptoms Details   Temp intolerance No    Palpitations No, except during anxiety   SOB No    Appetite No change    Weight changes No    Change in BM No    Diet No change   Exercise No, difficulty after knee surgery    Energy Levels Good    Sleep No change    Mental status change No change    Skin or hair changes No    Extremity Some edema.    Menses        Compressive symptoms No dysphagia, dysphonia.    Radiation exposure.            Relevant History Details   PMH  Autoimmune disease no  Neck pain as above       PSH Thyroid surgery ? No        Family history of thyroid / AI disease Yes.   Dad: Lupus, prostate  cancer.   Aunt: Stiff person syndrome,. ALS  Other aunt with crohns        Family history of thyroid nodule  No      Other ROS:     No eye symptoms  No headache, change in vision, loss of peripheral vision  Complete ROS that were obtained were negative.     Past Medical History:   Diagnosis Date     Depression with anxiety      No past surgical history on file.  Family History   Problem Relation Age of Onset     Lupus Father      Autoimmune Disease Paternal Aunt         ALS     Autoimmune Disease Paternal Aunt         crohn's     Social History     Socioeconomic History     Marital status: Single     Spouse name: Not on file     Number of children: Not on file     Years of education: Not on file     Highest education level: Not on file   Occupational History     Not on file   Social Needs     Financial resource strain: Not on file     Food insecurity:     Worry: Not on file     Inability: Not on file     Transportation needs:     Medical: Not on file     Non-medical: Not on file   Tobacco Use     Smoking status: Former Smoker     Smokeless tobacco: Never Used   Substance and Sexual Activity     Alcohol use: Yes     Drug use: No     Sexual activity: Not Currently     Partners: Male   Lifestyle     Physical activity:     Days per week: Not on file     Minutes per session: Not on file     Stress: Not on file   Relationships     Social connections:     Talks on phone: Not on file     Gets together: Not on file     Attends Druze service: Not on file     Active member of club or organization: Not on file     Attends meetings of clubs or organizations: Not on file     Relationship status: Not on file     Intimate partner violence:     Fear of current or ex partner: Not on file     Emotionally abused: Not on file     Physically abused: Not on file     Forced sexual activity: Not on file   Other Topics Concern     Not on file   Social History Narrative     Not on file      No Known Allergies  Current Outpatient  Medications   Medication     buPROPion (WELLBUTRIN SR) 150 MG 12 hr tablet     cyclobenzaprine (FLEXERIL) 5 MG tablet     levonorgestrel (MIRENA) 20 MCG/24HR IUD     fluticasone (FLONASE) 50 MCG/ACT spray     hydrOXYzine (ATARAX) 25 MG tablet     polyethylene glycol (MIRALAX/GLYCOLAX) packet     Pseudoephedrine-Naproxen Na (SINUS & COLD-D PO)     No current facility-administered medications for this visit.            Exam:  BP (!) 156/98 (BP Location: Left arm, Patient Position: Sitting, Cuff Size: Adult Regular)   Pulse 73   Wt 83.9 kg (185 lb)   SpO2 98%   BMI 27.72 kg/m     Constitutional: tearful at times, in a stressful situation today.   Head: Normocephalic.   Neck: Prominent submandibular gland in the right. . Thyroid symmetric, normal size, Carotids without bruits.  ENT: No throat congestion, no neck nodes or sinus tenderness  Cardiovascular: regular rhythm, no tachycardia  Respiratory: Lungs clear  Gastrointestinal: Abdomen soft, non-tender. BS normal. No striae  Musculoskeletal: gait normal and normal muscle tone  Neurologic: Normal speech, reflexes normal.   Psychiatric: mentation appears normal       TSH   Date Value Ref Range Status   06/26/2019 1.87 0.40 - 4.00 mU/L Final       No results found for: T4]    CBC RESULTS:   Recent Labs   Lab Test 06/26/19  0701   WBC 8.2   RBC 5.16   HGB 15.7   HCT 46.4   MCV 90   MCH 30.4   MCHC 33.8   RDW 12.7        Recent Labs   Lab Test 06/26/19  0701 05/11/18  0251    141   POTASSIUM 4.1 3.7   CHLORIDE 109 109   CO2 28 25   ANIONGAP 5 7   * 90   BUN 10 7   CR 0.92 0.85   SHANA 8.9 8.6     I reviewed the images with the patient.

## 2019-09-06 NOTE — LETTER
September 6, 2019      Zuleyma Reed  [address]  [place] [address]    To Whom It May Concern,      Zuleyma Reed is needing to have time off from work for medical issues.   She will be having an assessment completed early next week and then a plan can be established for her treatment. She has been struggling with this issue since May and has caused her to miss work. She may need to be off for a period of time and then when returns will need to be off intermittently in order to complete her plan of care and follow ups. At this point unable to give a definite time frame until determined will be an Out patient or will need an inpatient plan of care.             Sincerely,        BERTRAM Gilmore CNP

## 2019-09-06 NOTE — TELEPHONE ENCOUNTER
[facility] noted that Smarpt message read, no reply from patient.    [facility] attempted to complete phone outreach to patient. No answer. Voicemail left.    Cheli Al, [facility]  Behavioral Health Clinician

## 2019-09-07 ASSESSMENT — ANXIETY QUESTIONNAIRES: GAD7 TOTAL SCORE: 14

## 2019-09-09 ENCOUNTER — TELEPHONE (OUTPATIENT)
Dept: PSYCHOLOGY | Facility: CLINIC | Age: 49
End: 2019-09-09

## 2019-09-09 NOTE — TELEPHONE ENCOUNTER
"[facility] completed phone outreach to receive update following office visits and behavioral health needs on 9/6.    Patient stated that she is \"exhausted\" and has been sleeping a lot. She reported that al of her FMLA had been completed by her PCP, and she received good news at her endocrinology appointment. Patient stated that thyroid concerns were ruled out, and she now needs another ultrasound to determine next steps.      Patient reported that she continues to want to pursue day treatment or PHP.  Patient denied changes in risk status since office visit. She stated that she wants the symptoms to end, but denied wanting to kill herself.  Patient reported that she has her safety plan in an accessible location. Safety resources reviewed, and patient agreed to utilize resources if change in risk status.  Patient stated that she has her phone next to her in the event that intake team from behavioral health calls her.     Patient has an appointment with [facility] for 9/10. Patient stated that she wants to determine next steps of when her assessment will be for the day treatment/PHP program before she determines if she will come for appointment.     Cheli Al, [facility]  Behavioral Health Clinician   "

## 2019-09-10 ENCOUNTER — HOSPITAL ENCOUNTER (OUTPATIENT)
Dept: BEHAVIORAL HEALTH | Facility: CLINIC | Age: 49
Discharge: HOME | End: 2019-09-10
Attending: PSYCHOLOGIST | Admitting: PSYCHOLOGIST
Payer: COMMERCIAL

## 2019-09-10 ENCOUNTER — BEH TREATMENT PLAN (OUTPATIENT)
Dept: BEHAVIORAL HEALTH | Facility: CLINIC | Age: 49
End: 2019-09-10
Attending: PSYCHIATRY & NEUROLOGY

## 2019-09-10 DIAGNOSIS — F32.9 MDD (MAJOR DEPRESSIVE DISORDER): ICD-10-CM

## 2019-09-10 PROCEDURE — 90791 PSYCH DIAGNOSTIC EVALUATION: CPT | Performed by: PSYCHOLOGIST

## 2019-09-10 NOTE — TELEPHONE ENCOUNTER
FMLA paperwork completed and faxed from office visit 9/6/19. Copy given to patient.    MICHAEL Andrews

## 2019-09-10 NOTE — PROGRESS NOTES
"Initial Individual Treatment Plan     Patient: Zuleyma Reed   MRN: [id number]  : 1970  Age: 49 year old  Sex: female    Diagnostic Assessment Date / Date of Initial Individual Treatment Plan: 9/10/19      Immediate Health Concerns:  No     Immediate Safety Concerns:  No. Per history, see safety plan.    Identify the issues to be addressed in treatment:  Symptom Management, Personal Safety, Community Resources/Discharge Planning and Develop / Improve Independent Living Skills    Client Initial Individualized Goals for Treatment: \"stabilize mood to increase daily functioning\"    Initial Treatment suggestions for the client during the time between Diagnostic Assessment and completion of the Individualized Treatment Plan:  Follow Safety Plan   Ask for more information, support and/or assistance as needed.  Follow up with providers/community supports as needed:   Report increases or changes in symptoms to staff.  Report any personal safety concerns to staff.   Take medications as prescribed.  Report medication changes and/or side effects to staff.  Attend and participate in groups as scheduled or notify staff if unable to do so.  Report any use of substances to staff as this may impact your symptoms and/or personal safety.  Notify staff if you have any other issues that need to be addressed. This may include any current abuse / neglect / exploitation or other vulnerability.  Follow recommendations of your treatment team and discuss concerns if not in agreement.     Treatment Team Responsible: [facility] ()      Therapeutic Interventions/Treatment Strategies may include:  Support, Redirection, Feedback, Limit/Boundaries, Safety Assessments, Structured Activity, Problem Solving, Clarification, Education, Motivational Enhancement and Relapse Prevention as needed.    Isela Torre LP              "

## 2019-09-10 NOTE — PROGRESS NOTES
Adult Outpatient Mental Health Programs    MY COPING PLAN FOR SAFETY    NAME: Zuleyma Reed  MRN: [id number]  SAFETY PLAN:  Step 1: Warning signs / cues (Thoughts, images, mood, situation, behavior) that a crisis may be developing:    Thoughts: I am so miserable    Images: flashbacks    Thinking Processes: racing thoughts    Mood: worsening depression, hopelessness, helplessness and intense worry    Behaviors: isolating/withdrawing , can't stop crying, not taking care of myself and not taking care of my responsibilities    Situations: trauma    Step 2: Coping strategies - Things I can do to take my mind off of my problems without contacting another person (relaxation technique, physical activity):    Distress Tolerance Strategies:  watching tv that is funny and is a rerun    Physical Activities: playing pool  Step 3: People and social settings that provide distraction:     Name:  None identified     none identified   Step 4: Remind myself of people and things that are important to me and worth living for:  Family, daughter  Step 5: When I am in crisis, I can ask these people to help me use my safety plan:   Name:  Parents   Step 6: Making the environment safe:     none identified  Step 7: Professionals or agencies I can contact during a crisis:    Suicide Prevention Lifeline: 5-532-319-TALK (9255)    Crisis Text Line Service (available 24 hours a day, 7 days a week): Text [place] to 341090    Call  **CRISIS (151112) from a cell phone to talk to a team of professionals who can help you.  Crisis Services By [facility]: Phone Number:   [place]     [phone]   [place]    [phone]   [place]    [phone]   [place]    [phone]   [place]    [phone]   [place] [phone]   [place]     [phone]       Call 911 or go to my nearest emergency department.     I helped develop this safety plan and agree to use it when needed.  I have been given a copy of this plan.      Client signature  _________________________________________________________________  Today s date:  9/10/2019  Adapted from Safety Plan Template 2008 Shruthi Daly and George Garcia is reprinted with the express permission of the authors.  No portion of the Safety Plan Template may be reproduced without the express, written permission.  You can contact the authors at bhs@[email or web address].[facility] or [email or web address].

## 2019-09-10 NOTE — PROGRESS NOTES
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 skills to stabilize her mood she she can function daily. She reports the following strengths; kind, smart, and good problem solver.   Client would benefit from stabilization and treatment planning.      Prognosis is Fair. Without the recommended intervention, the client is likely to experience the following consequences of their symptoms: continuation of symptoms with decreased daily functioning, requiring an increased level of care.      Referrals to services required by statute or rule:   Report to child/adult protection services was NA.   client is on a wait list for psychiatry at [facility] clinic and waiting to get in to individual therapy.    Program Recommendation: [facility] () .  Client cannot start until 09.16 due to appts and work arrangements.    Assessment Completed by: Isela Torre LP

## 2019-09-10 NOTE — PROGRESS NOTES
LOCUS Worksheet     Name: Zuleyma Reed MRN: [id number]    : 1970      Gender:  female    PMI:  Pre1   Provider Name: [facility]   Provider NPI:  [id number]    Actual level of Care Provided:  Outpatient services in [facility]    Service(s) receiving or referred to:  PHP    Reason for Variance: increase in depression and anxiety leading to decreased daily functioning      Rating completed by: Isela Torre PsyD, MSEd LP      I. Risk of Harm:   2      Low Risk of Harm    II. Functional Status:   4      Serious Impairment    III. Co-Morbidity:   2      Minor Co-Morbidity    IV - A. Recovery Environment - Level of Stress:   4      Highly Stress Environment    IV - B. Recovery Environment - Level of Support:   4      Minimal Support in Environment    V. Treatment and Recovery History:   4      Poor Response to Treatment and Recovery Management    VI. Engagement and Recovery Project:   2      Positive Engagement and Recovery       20 Composite Score    Level of Care Recommendation:   20 to 22       Medically Monitored Non-Residential Services     Note Text (......Xxx Chief Complaint.): This diagnosis correlates with the Other (Free Text): Will need blood work at next appointment Detail Level: Zone

## 2019-09-16 ENCOUNTER — TELEPHONE (OUTPATIENT)
Dept: BEHAVIORAL HEALTH | Facility: CLINIC | Age: 49
End: 2019-09-16

## 2019-09-17 ENCOUNTER — TELEPHONE (OUTPATIENT)
Dept: BEHAVIORAL HEALTH | Facility: CLINIC | Age: 49
End: 2019-09-17

## 2019-09-17 NOTE — ADDENDUM NOTE
Encounter addended by: Isela Torre LP on: 9/17/2019 11:20 AM   Actions taken: Order Reconciliation Section accessed

## 2019-09-18 ENCOUNTER — TELEPHONE (OUTPATIENT)
Dept: BEHAVIORAL HEALTH | Facility: CLINIC | Age: 49
End: 2019-09-18

## 2019-09-19 ENCOUNTER — HOSPITAL ENCOUNTER (OUTPATIENT)
Dept: BEHAVIORAL HEALTH | Facility: CLINIC | Age: 49
End: 2019-09-19
Attending: PSYCHIATRY & NEUROLOGY
Payer: COMMERCIAL

## 2019-09-19 PROBLEM — F32.9 MDD (MAJOR DEPRESSIVE DISORDER): Status: ACTIVE | Noted: 2019-09-19

## 2019-09-19 PROCEDURE — H0035 MH PARTIAL HOSP TX UNDER 24H: HCPCS

## 2019-09-19 ASSESSMENT — PATIENT HEALTH QUESTIONNAIRE - PHQ9: SUM OF ALL RESPONSES TO PHQ QUESTIONS 1-9: 22

## 2019-09-19 NOTE — PROGRESS NOTES
"Adult Mental Health Outpatient Group Therapy Progress Note     Client Initial Individualized Goals for Treatment: Address depression, establish healthy boundaries    Area of Treatment Focus:  Symptom Management and Develop / Improve Independent Living Skills     Therapeutic Interventions/Treatment Strategies:  Support, Feedback, Safety Assessment, Structured Activity, Problem Solving and Education     Response to Treatment Strategies:  Accepted Feedback, Gave Feedback, Listened, Focused on Goals, Attentive, Accepted Support and Alert      Name of Group:  Group therapy I  Date/Time: 09/19/2019 1100 to 1150, Group Participants:4    Description and Outcome:   Group therapy I  The group spent the session getting to know one another, as most of them are new to the PHP. Patients discussed issues related to shame, anger, and dependency, with the underlying goal of understanding maladaptive ways of self-regulation that they have employed over time. Patient started the group by sharing that she often feels \"full,\" meaning that she often \"feels others feelings.\" Patient did not endorse any concerns or issues related to suicidal/homicidal ideation, nor did she endorse recent substance use.     Is this a Weekly Review of the Progress on the Treatment Plan?  No    "

## 2019-09-19 NOTE — PROGRESS NOTES
Adult Partial Hospitalization Program    PATIENT'S NAME: Zuleyma Reed  MRN:   [id number]  :   1970  ACCT. NUMBER: [id number]  DATE OF SERVICE: 19  START TIME: 1300  END TIME: 1350    NUMBER OF PARTICIPANTS: 9      Summary of Group / Topics Discussed:  Self-Awareness: Self-Compassion: Patients received overview of key concepts in developing self-compassion. Patients discussed mindfulness, self-kindness, and finding common humanity. Patients identified their current approach to problems in their lives and learned skills for increasing self-compassion. Patients identified ways they can put self-compassion skills into practice and problem solve barriers to application of skills.     Patient Session Goals / Objectives:    [place] components of self-compassion    Identify ways to practice self-compassion in daily life    Problem solve barriers to self-compassion practice    Patient Participation / Response:  Fully participated with the group by sharing personal reflections / insights and openly received / provided feedback with other participants.    Demonstrated understanding of topics discussed through group discussion and participation, Demonstrated understanding of values, strengths, and challenges to learn about themselves, Identified / Expressed readiness to act intentionally, increase self-compassion, promote personal growth, Identified plan to address barriers to practicing skills that promote self-awareness  and Practiced skills in session    Treatment Plan:  Patient has an initial individualized treatment plan that was created as part of their diagnostic assessment / admission process.  A master individualized treatment plan is in the process of being developed with the patient and multi-disciplinary care team.

## 2019-09-19 NOTE — PROGRESS NOTES
Adult Partial Hospitalization Program    PATIENT'S NAME: Zuleyma Reed  MRN:   [id number]  :   1970  ACCT. NUMBER: [id number]  DATE OF SERVICE: 19  START TIME: 1000  END TIME: 1050    NUMBER OF PARTICIPANTS: 9      Summary of Group / Topics Discussed:  Coping Skills: Calming Strategies: Patients discussed and practiced strategies to increase sense of calm in the body.  Reviewed the benefits of calming strategies as well as past / current practices of each member.  Patients identified situations in which using these strategies would reduce stress. They developed the ability to distinguish when these strategies can be useful in their lives for management and stress and psychological well-being.    Patient Session Goals / Objectives:    Understand the purpose of using calming strategies to reduce stress    Review patients current calming practices and discuss a more formal way of practicing and accessing skills.    Increase ability to decide when to use various calming strategies (e.g. Progressive muscle relaxation, deep breathing, guided imagery, + thoughts).    Choose 1-2 calming strategies to apply during times of distress.    Patient Participation / Response:  Moderately participated, sharing some personal reflections / insights and adequately adequately received / provided feedback with other participants.    Expressed understanding of the relevance / importance of coping skills at distressing times in life, Demonstrated knowledge of when to consider using a variety of coping skills in daily life, Identified barriers to applying coping skills, Identified 2-3 positive coping strategies that have helped maintain / improve symptoms in the past, Practiced 2-3 new coping skills in session and Identified / Expressed personal readiness to practice new coping skills    Treatment Plan:  Patient has an initial individualized treatment plan that was created as part of their diagnostic assessment / admission  process.  A master individualized treatment plan is in the process of being developed with the patient and multi-disciplinary care team.

## 2019-09-20 ENCOUNTER — HOSPITAL ENCOUNTER (OUTPATIENT)
Dept: BEHAVIORAL HEALTH | Facility: CLINIC | Age: 49
End: 2019-09-20
Attending: PSYCHIATRY & NEUROLOGY
Payer: COMMERCIAL

## 2019-09-20 PROCEDURE — H0035 MH PARTIAL HOSP TX UNDER 24H: HCPCS

## 2019-09-20 NOTE — PROGRESS NOTES
NUMBER OF PARTICIPANTS: 9  Group A  Start 1300  End 1350    Summary of Group / Topics Discussed:  Health Maintenance: Weekend planning: Patients were given time to complete a weekend plan of what they will do to promote wellness and sobriety over the weekend when they do not have the structure of group. Patients were encouraged to review progress on their treatment goals and were challenged to identify ways to work toward meeting them. Patients identified and discussed foreseeable barriers to success over the weekend and then developed a plan to overcome them. Patients reviewed their distress coping skills and social support network and discussed this with the group.       Patient Session Goals / Objectives:    ?    Identified activities to engage in that promote balance in wellness  ?    Distinguished possible barriers to success over the weekend and created a plan to overcome them  ?    Listed distress coping skills and identified social support network to utilize if in crisis during the weekend    Patient Participation / Response:  Moderately participated, sharing some personal reflections / insights and adequately adequately received / provided feedback with other participants.    Demonstrated understanding of topics discussed through group discussion and participation and Further teaching needed, such as identifying ways to problem solve barriers     Treatment Plan:  Patient has an initial individualized treatment plan that was created as part of their diagnostic assessment / admission process.  A master individualized treatment plan is in the process of being developed with the patient and multi-disciplinary care team.

## 2019-09-20 NOTE — PROGRESS NOTES
NUMBER OF PARTICIPANTS: 9  Group A  Start time 1000  End time 1050    Summary of Group / Topics Discussed:  Coping Skills: Radical Acceptance: Patients learned radical acceptance as a way to tolerate heightened stress in the moment.  Patients identified situations that necessitate radical acceptance.  They focused on applying acceptance of the moment to tolerate difficult emotions and events. Patients discussed how to distinguish when this can be useful in their lives and when other skills are more relevant or helpful.    Patient Session Goals / Objectives:    Understand that some amount of pain exists for each of us in our lives    Process the difficulty of acceptance in our lives and benefits of radical acceptance to mood and functioning.    Demonstrate understanding of when to use the radical acceptance to tolerate distress by providing examples of situations where this could be applied.    Identify barriers to applying radical acceptance to difficult situations and explore strategies to overcome them.    Patient Participation / Response:  Moderately participated, sharing some personal reflections / insights and adequately adequately received / provided feedback with other participants.    Demonstrated understanding of topics discussed through group discussion and participation and Identified barriers to applying coping skills    Treatment Plan:  Patient has an initial individualized treatment plan that was created as part of their diagnostic assessment / admission process.  A master individualized treatment plan is in the process of being developed with the patient and multi-disciplinary care team.

## 2019-09-20 NOTE — PROGRESS NOTES
Adult Mental Health Outpatient Group Therapy Progress Note     Client Initial Individualized Goals for Treatment: reduce and manage anxiety  Area of Treatment Focus:  Symptom Management and Develop / Improve Independent Living Skills    Therapeutic Interventions/Treatment Strategies:  Support, Feedback, Safety Assessment, Structured Activity, Problem Solving and Education    Response to Treatment Strategies:  Accepted Feedback, Gave Feedback, Listened, Focused on Goals, Attentive, Accepted Support and Alert    Name of Group:  Group therapy I  Date/Time: 09/20/2019 0150-1488, Group Participants: 5      Description and Outcome:  Group therapy I  The group spent the hour discussion on how it can be challenging to be open up with new people and that experiencing an increase of symptoms can feel like it is denoting the progress they have made in treatment. She arrived at 930 to group. Patient noted that this program has been a struggle and she felt frustrated when she arrived due to sitting in traffic and arriving late. She did feel positive that she was able to shower.   Patient did not endorse any concerns or issues related to suicidal/homicidal ideation, nor did she endorse recent substance use.     Is this a Weekly Review of the Progress on the Treatment Plan?  No

## 2019-09-20 NOTE — PROGRESS NOTES
Adult Partial Hospitalization Program    PATIENT'S NAME: Zuleyma Reed  MRN:   [id number]  :   1970  ACCT. NUMBER: [id number]  DATE OF SERVICE: 19  START TIME: 1400  END TIME: 1450    NUMBER OF PARTICIPANTS: 9      Summary of Group / Topics Discussed:  Mindfulness: Mindfulness Experiential: Patients received an overview on what mindfulness is and how mindfulness can benefit general health, mental health symptoms, and stressors. The history of mindfulness, its application to mental health therapies, and key concepts were also discussed. Patients discussed current awareness, knowledge, and practice of mindfulness skills. Patients also discussed barriers to mindfulness practice.    Patient Session Goals / Objectives:    Demonstrated and verbalized understanding of key mindfulness concepts    Identified when/how to use mindfulness skills    Resolved barriers to practicing mindfulness skills    Identified plan to use mindfulness skills in daily life     Patient Participation / Response:  Minimally participated, only when prompted / asked.    Identified / Expressed personal readiness to practice mindfulness skills, Verbalized understanding of how mindfulness can benefit mental health symptoms, Identified plan to address barriers to practicing mindfulness skills  and Practiced skills in session    Treatment Plan:  Patient has an initial individualized treatment plan that was created as part of their diagnostic assessment / admission process.  A master individualized treatment plan is in the process of being developed with the patient and multi-disciplinary care team.
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
## 2019-09-23 ENCOUNTER — TELEPHONE (OUTPATIENT)
Dept: BEHAVIORAL HEALTH | Facility: CLINIC | Age: 49
End: 2019-09-23

## 2019-09-25 ENCOUNTER — TELEPHONE (OUTPATIENT)
Dept: BEHAVIORAL HEALTH | Facility: CLINIC | Age: 49
End: 2019-09-25

## 2019-09-27 NOTE — PROGRESS NOTES
BEH Programs:[id number]}    PATIENT'S NAME: Zuleyma Reed  MRN:   [id number]  :   1970  ACCT. NUMBER: [id number]  DATE OF SERVICE: 19  START TIME: 2:00  END TIME: 2:50    NUMBER OF PARTICIPANTS: 9      Summary of Group / Topics Discussed:  Mental Health Maintenance: Letting Go of Stress: Patients viewed 20 minute video which demonstrated simple proven techniques too quickly and effectively release stress.     Patient Session Goals / Objectives:  ? Patients discovered quick, easy and effective stress reduction techniques  ? Patients practiced techniques that were demonstrated on the video  ? Patients identified one technique they will use to cope with symptoms      Patient Participation / Response:  Moderately participated, sharing some personal reflections / insights and adequately adequately received / provided feedback with other participants.    Verbalized understanding of mental health maintenance topic    Treatment Plan:  Patient has an initial individualized treatment plan that was created as part of their diagnostic assessment / admission process.  A master individualized treatment plan is in the process of being developed with the patient and multi-disciplinary care team.

## 2019-10-01 ENCOUNTER — OFFICE VISIT (OUTPATIENT)
Dept: PEDIATRICS | Facility: CLINIC | Age: 49
End: 2019-10-01
Payer: COMMERCIAL

## 2019-10-01 VITALS
WEIGHT: 194.1 LBS | TEMPERATURE: 98.9 F | DIASTOLIC BLOOD PRESSURE: 82 MMHG | BODY MASS INDEX: 29.08 KG/M2 | OXYGEN SATURATION: 98 % | HEART RATE: 88 BPM | SYSTOLIC BLOOD PRESSURE: 130 MMHG

## 2019-10-01 DIAGNOSIS — J04.0 LARYNGITIS: ICD-10-CM

## 2019-10-01 DIAGNOSIS — J01.00 ACUTE MAXILLARY SINUSITIS, RECURRENCE NOT SPECIFIED: Primary | ICD-10-CM

## 2019-10-01 DIAGNOSIS — J20.9 ACUTE BRONCHITIS, UNSPECIFIED ORGANISM: ICD-10-CM

## 2019-10-01 PROCEDURE — 99213 OFFICE O/P EST LOW 20 MIN: CPT | Performed by: FAMILY MEDICINE

## 2019-10-01 RX ORDER — AZITHROMYCIN 250 MG/1
TABLET, FILM COATED ORAL
Qty: 6 TABLET | Refills: 0 | Status: ON HOLD | OUTPATIENT
Start: 2019-10-01 | End: 2019-10-22

## 2019-10-01 RX ORDER — CODEINE PHOSPHATE AND GUAIFENESIN 10; 100 MG/5ML; MG/5ML
1-2 SOLUTION ORAL
Qty: 60 ML | Refills: 0 | Status: ON HOLD | OUTPATIENT
Start: 2019-10-01 | End: 2019-10-22

## 2019-10-01 RX ORDER — PREDNISONE 20 MG/1
20 TABLET ORAL DAILY
Qty: 5 TABLET | Refills: 0 | Status: ON HOLD | OUTPATIENT
Start: 2019-10-01 | End: 2019-10-22

## 2019-10-01 NOTE — PROGRESS NOTES
Subjective     Zuleyma Reed is a 49 year old female who presents to clinic today for the following health issues:    HPI   RESPIRATORY SYMPTOMS      Duration: 3 weeks    Description  nasal congestion, rhinorrhea, sore throat, facial pain/pressure, cough, chills, ear pain bilateral, headache, fatigue/malaise, hoarse voice and myalgias    Severity: moderate    Accompanying signs and symptoms: None    History (predisposing factors):  none    Precipitating or alleviating factors: None    Therapies tried and outcome:  rest and fluids oral decongestant antihistamine acetaminophen OTC NSAID guaifenesin      Patient Active Problem List   Diagnosis     Depression with anxiety     Allergic rhinitis     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     Dysthymic disorder     Family history of autoimmune disorder     Generalized anxiety disorder     IUD (intrauterine device) in place     Major depressive disorder, recurrent episode (H)     Primary localized osteoarthrosis, lower leg     Tension type headache     MDD (major depressive disorder)     Past Surgical History:   Procedure Laterality Date     ORTHOPEDIC SURGERY         Social History     Tobacco Use     Smoking status: Former Smoker     Smokeless tobacco: Never Used   Substance Use Topics     Alcohol use: Yes     Family History   Problem Relation Age of Onset     Lupus Father      Autoimmune Disease Paternal Aunt         ALS     Autoimmune Disease Paternal Aunt         crohn's     Suicide Maternal Great-Grandmother          Current Outpatient Medications   Medication Sig Dispense Refill     azithromycin (ZITHROMAX) 250 MG tablet Take 2 tablets (500 mg) by mouth daily for 1 day, THEN 1 tablet (250 mg) daily for 4 days. 6 tablet 0     buPROPion (WELLBUTRIN SR) 150 MG 12 hr tablet TAKE 1 TABLET BY MOUTH TWICE A DAY 60 tablet 1     cyclobenzaprine (FLEXERIL) 5 MG tablet May take 1 or 2 tablets 3 times a day as needed for muscle spasm and pain. Sedating. Preferably  take at bedtime 90 tablet 0     fluticasone (FLONASE) 50 MCG/ACT spray Spray 1-2 sprays into both nostrils daily 1 Bottle 0     guaiFENesin-codeine (ROBITUSSIN AC) 100-10 MG/5ML solution Take 5-10 mLs by mouth nightly as needed 60 mL 0     hydrOXYzine (ATARAX) 25 MG tablet Take 1-2 tablets (25-50 mg) by mouth every 8 hours as needed for anxiety 30 tablet 1     levonorgestrel (MIRENA) 20 MCG/24HR IUD 1 each by Intrauterine route once       polyethylene glycol (MIRALAX/GLYCOLAX) packet Take 17 g by mouth daily 10 packet 0     predniSONE (DELTASONE) 20 MG tablet Take 1 tablet (20 mg) by mouth daily for 5 days 5 tablet 0     Pseudoephedrine-Naproxen Na (SINUS & COLD-D PO)        No Known Allergies  Recent Labs   Lab Test 06/26/19  0701 05/11/18  0251   LDL 91  --    HDL 57  --    TRIG 69  --    ALT 21  --    CR 0.92 0.85   GFRESTIMATED 74 72   GFRESTBLACK 85 87   POTASSIUM 4.1 3.7   TSH 1.87  --       BP Readings from Last 3 Encounters:   10/01/19 130/82   09/06/19 (!) 156/98   09/06/19 130/80    Wt Readings from Last 3 Encounters:   10/01/19 88 kg (194 lb 1.6 oz)   09/06/19 83.9 kg (185 lb)   09/06/19 84.2 kg (185 lb 9.6 oz)                    Reviewed and updated as needed this visit by Provider  Tobacco  Problems  Med Hx  Surg Hx  Fam Hx  Soc Hx        Review of Systems   ROS COMP: Constitutional, HEENT, cardiovascular, pulmonary, GI, , musculoskeletal, neuro, skin, endocrine and psych systems are negative, except as otherwise noted.      Objective    /82   Pulse 88   Temp 98.9  F (37.2  C) (Oral)   Wt 88 kg (194 lb 1.6 oz)   LMP  (LMP Unknown)   SpO2 98%   BMI 29.08 kg/m    Body mass index is 29.08 kg/m .  Physical Exam   GENERAL: healthy, alert and no distress  EYES: Eyes grossly normal to inspection, PERRL and conjunctivae and sclerae normal  HENT: ear canals and TM's normal, nose and mouth without ulcers or lesions  NECK: no adenopathy, no asymmetry, masses, or scars and thyroid normal to  palpation  RESP: lungs clear to auscultation - no rales, rhonchi or wheezes  CV: regular rate and rhythm, normal S1 S2, no S3 or S4, no murmur, click or rub, no peripheral edema and peripheral pulses strong  ABDOMEN: soft, nontender, no hepatosplenomegaly, no masses and bowel sounds normal  MS: no gross musculoskeletal defects noted, no edema            Assessment & Plan     1. Acute maxillary sinusitis, recurrence not specified  Symptomatic therapy suggested: push fluids, rest, gargle warm salt water, use vaporizer or mist needed , use acetaminophen, ibuprofen as needed, apply heat to sinuses as needed and Return office visit if symptoms persist or worsen. Call or   - azithromycin (ZITHROMAX) 250 MG tablet; Take 2 tablets (500 mg) by mouth daily for 1 day, THEN 1 tablet (250 mg) daily for 4 days.  Dispense: 6 tablet; Refill: 0  - predniSONE (DELTASONE) 20 MG tablet; Take 1 tablet (20 mg) by mouth daily for 5 days  Dispense: 5 tablet; Refill: 0  - guaiFENesin-codeine (ROBITUSSIN AC) 100-10 MG/5ML solution; Take 5-10 mLs by mouth nightly as needed  Dispense: 60 mL; Refill: 0    2. Laryngitis  - azithromycin (ZITHROMAX) 250 MG tablet; Take 2 tablets (500 mg) by mouth daily for 1 day, THEN 1 tablet (250 mg) daily for 4 days.  Dispense: 6 tablet; Refill: 0  - predniSONE (DELTASONE) 20 MG tablet; Take 1 tablet (20 mg) by mouth daily for 5 days  Dispense: 5 tablet; Refill: 0  - guaiFENesin-codeine (ROBITUSSIN AC) 100-10 MG/5ML solution; Take 5-10 mLs by mouth nightly as needed  Dispense: 60 mL; Refill: 0    3. Acute bronchitis, unspecified organism  - azithromycin (ZITHROMAX) 250 MG tablet; Take 2 tablets (500 mg) by mouth daily for 1 day, THEN 1 tablet (250 mg) daily for 4 days.  Dispense: 6 tablet; Refill: 0  - predniSONE (DELTASONE) 20 MG tablet; Take 1 tablet (20 mg) by mouth daily for 5 days  Dispense: 5 tablet; Refill: 0  - guaiFENesin-codeine (ROBITUSSIN AC) 100-10 MG/5ML solution; Take 5-10 mLs by mouth nightly as  needed  Dispense: 60 mL; Refill: 0         Return if symptoms worsen or fail to improve.    Geeta Adam MD  [facility]

## 2019-10-01 NOTE — PROGRESS NOTES
Adult Partial Hospitalization Program    PATIENT'S NAME: Zuleyma Reed  MRN:   [id number]  :   1970  ACCT. NUMBER: [id number]  DATE OF SERVICE: 19  START TIME: 11:00  END TIME: 11:50    NUMBER OF PARTICIPANTS: 9      Summary of Group / Topics Discussed:  Occupational Therapy Clinic: Provided opportunity for patients to independently choose and engage in a therapeutic activity that supports progress towards their goals and psychiatric recovery. The Occupational Therapy Clinic provides a context for patients to monitor their symptoms, gain self-awareness, practice skills (self-regulation, mindfulness, self-talk, focus/concentration, social, productivity), build a sense of self efficacy and mastery, as well as receive validation, support, and resources. OT checks in, observes, assesses, and monitors performance skills and patterns in context with each group member. Patient completed the Occupational Self Assessment (LIBERTY) in which identified functional areas their mental health status is impacting and which are most important for them to work on while in the Partial Hospitalization Program. Patient was provided orientation to the OT clinic and overview of purpose of the OT clinic in support of meeting their goals. *    Patient Session Goals / Objectives:    Independently identify a purposeful and meaningful therapeutic activity.    Identified which goal(s) they are intentionally working on during session.     Reflected on their performance and share insight about their progress.    Practiced and identified a way to generalize a skill to their everyday life      Patient Participation / Response:  Moderately participated, sharing some personal reflections / insights and adequately adequately received / provided feedback with other participants.    Verbalized understanding of content and Patient would benefit from additional opportunities to practice the content to be able to generalize it to their everyday  life with increased intentionality, consistency, and efficacy in support of their psychiatric recovery    Treatment Plan:  Patient has an initial individualized treatment plan that was created as part of their diagnostic assessment / admission process.  A master individualized treatment plan is in the process of being developed with the patient and multi-disciplinary care team.

## 2019-10-01 NOTE — PATIENT INSTRUCTIONS
Patient Education     Laryngitis    Laryngitis is a swelling of the tissues around the vocal cords. Symptoms include a hoarse (scratchy) voice. Or your voice may be gone for a few days or longer. This may be caused by a viral illness, such as a head or chest cold. It may also be due to overuse and strain of your voice. Smoking, drinking alcohol, acid reflux, allergies, or inhaling harsh chemicals may also lead to symptoms. This condition will usually go away in 1-2 weeks.  Home care    Rest your voice until it recovers. Talk as little as possible. If your symptoms are severe, rest at home for a day or so.    Moist air may help your symptoms. Try breathing cool steam from a humidifier or vaporizer. Or breathe air from a steamy shower.    Drink plenty of fluids to stay well hydrated.    Do not smoke  Follow-up care  Follow up with your healthcare provider or this facility if you are not better after 1 week. If your hoarse voice lasts more than 2 weeks, you may need to see an otolaryngologist. This is a doctor who treats diseases and disorders of the ear, nose, and throat (ENT). Seeing this doctor is especially important if you have a history of alcohol or tobacco use.  When to seek medical advice  Contact your healthcare provider if you have any of the following:    Symptoms that get worse    Severe pain with swallowing    Trouble opening your mouth    Neck swelling, neck pain, or trouble moving your neck    Noisy breathing or trouble breathing    Fever of 100.4 F (38. C) or higher, or as directed by your healthcare provider    Drooling    Symptoms do not go away in 2 weeks  Date Last Reviewed: 11/1/2017 2000-2018 [employer]. [address] [place] [address]. All rights reserved. This information is not intended as a substitute for professional medical care. Always follow your healthcare professional's instructions.         Patient Education     Acute Sinusitis    Acute sinusitis  is irritation and swelling of the sinuses. It is usually caused by a viral infection after a common cold. Your doctor can help you find relief.  What is acute sinusitis?  Sinuses are air-filled spaces in the skull behind the face. They are kept moist and clean by a lining of mucosa. Things such as pollen, smoke, and chemical fumes can irritate the mucosa. It can then swell up. As a response to irritation, the mucosa makes more mucus and other fluids. Tiny hairlike cilia cover the mucosa. Cilia help carry mucus toward the opening of the sinus. Too much mucus may cause the cilia to stop working. This blocks the sinus opening. A buildup of fluid in the sinuses then causes pain and pressure. It can also encourage bacteria to grow in the sinuses.  Common symptoms of acute sinusitis  You may have:    Facial soreness pain    Headache    Fever    Fluid draining in the back of the throat (postnasal drip)    Congestion    Drainage that is thick and colored, instead of clear    Cough  Diagnosing acute sinusitis  Your doctor will ask about your symptoms and health history. He or she will look at your ear, nose, and throat. You usually won't need to have X-rays taken.    The doctor may take a sample of mucus to check for bacteria. If you have sinusitis that keeps coming back, you may need imaging tests such as X-rays or CAT scans. This will help your doctor check for a structural problem that may be causing the infection.  Treating acute sinusitis  Treatment is aimed at unblocking the sinus opening and helping the cilia work again. You may need to take antihistamine and decongestant medicine. These can reduce inflammation and decrease the amount of fluid your sinuses make. If you have a bacterial infection, you will need to take antibiotic medicine for 10 to 14 days. Take this medicine until it is gone, even if you feel better.  Date Last Reviewed: 10/1/2016    9217-0448 [employer]. [address],  [place] [address]. All rights reserved. This information is not intended as a substitute for professional medical care. Always follow your healthcare professional's instructions.

## 2019-10-04 ENCOUNTER — OFFICE VISIT (OUTPATIENT)
Dept: PEDIATRICS | Facility: CLINIC | Age: 49
End: 2019-10-04
Payer: COMMERCIAL

## 2019-10-04 VITALS
OXYGEN SATURATION: 99 % | HEART RATE: 84 BPM | BODY MASS INDEX: 29.01 KG/M2 | DIASTOLIC BLOOD PRESSURE: 90 MMHG | WEIGHT: 193.6 LBS | TEMPERATURE: 99.6 F | SYSTOLIC BLOOD PRESSURE: 142 MMHG

## 2019-10-04 DIAGNOSIS — J04.0 LARYNGITIS: ICD-10-CM

## 2019-10-04 DIAGNOSIS — J20.9 ACUTE BRONCHITIS, UNSPECIFIED ORGANISM: ICD-10-CM

## 2019-10-04 DIAGNOSIS — F33.9 EPISODE OF RECURRENT MAJOR DEPRESSIVE DISORDER, UNSPECIFIED DEPRESSION EPISODE SEVERITY (H): Primary | ICD-10-CM

## 2019-10-04 DIAGNOSIS — R41.840 POOR CONCENTRATION: ICD-10-CM

## 2019-10-04 DIAGNOSIS — F41.1 GENERALIZED ANXIETY DISORDER: ICD-10-CM

## 2019-10-04 PROCEDURE — 99214 OFFICE O/P EST MOD 30 MIN: CPT | Performed by: NURSE PRACTITIONER

## 2019-10-04 ASSESSMENT — ANXIETY QUESTIONNAIRES
GAD7 TOTAL SCORE: 13
2. NOT BEING ABLE TO STOP OR CONTROL WORRYING: MORE THAN HALF THE DAYS
7. FEELING AFRAID AS IF SOMETHING AWFUL MIGHT HAPPEN: SEVERAL DAYS
3. WORRYING TOO MUCH ABOUT DIFFERENT THINGS: MORE THAN HALF THE DAYS
1. FEELING NERVOUS, ANXIOUS, OR ON EDGE: MORE THAN HALF THE DAYS
5. BEING SO RESTLESS THAT IT IS HARD TO SIT STILL: MORE THAN HALF THE DAYS
6. BECOMING EASILY ANNOYED OR IRRITABLE: MORE THAN HALF THE DAYS
IF YOU CHECKED OFF ANY PROBLEMS ON THIS QUESTIONNAIRE, HOW DIFFICULT HAVE THESE PROBLEMS MADE IT FOR YOU TO DO YOUR WORK, TAKE CARE OF THINGS AT HOME, OR GET ALONG WITH OTHER PEOPLE: EXTREMELY DIFFICULT

## 2019-10-04 ASSESSMENT — PATIENT HEALTH QUESTIONNAIRE - PHQ9
SUM OF ALL RESPONSES TO PHQ QUESTIONS 1-9: 25
5. POOR APPETITE OR OVEREATING: MORE THAN HALF THE DAYS

## 2019-10-04 NOTE — NURSING NOTE
"Chief Complaint   Patient presents with     Depression     forms to be filled out     Anxiety       Initial BP (!) 142/90 (BP Location: Right arm, Patient Position: Sitting, Cuff Size: Adult Regular)   Pulse 84   Temp 99.6  F (37.6  C) (Temporal)   Wt 87.8 kg (193 lb 9.6 oz)   LMP  (LMP Unknown)   SpO2 99%   Breastfeeding? No   BMI 29.01 kg/m   Estimated body mass index is 29.01 kg/m  as calculated from the following:    Height as of 7/22/19: 1.74 m (5' 8.5\").    Weight as of this encounter: 87.8 kg (193 lb 9.6 oz).  Medication Reconciliation: complete      MICHAEL Andrews      "

## 2019-10-04 NOTE — PATIENT INSTRUCTIONS
PLAN:   1.   Symptomatic therapy suggested: rest, increase fluids, apply heat to sinuses prn, use mist of vaporizer prn, OTC Robitussin DM and call prn if symptoms persist or worsen.  2.  Orders Placed This Encounter   Procedures     MENTAL HEALTH REFERRAL  - Adult; Assessments and Testing; ADHD; FMG: [facility] [phone]; We will contact you to schedule the appointment or please call with any questions     3. Patient needs to follow up in if no improvement,or sooner if worsening of symptoms or other symptoms develop.  CONSULTATION/REFERRAL to mental health for Adhd testing and psychiatry   Let me know about date of partial hospitalization program

## 2019-10-04 NOTE — PROGRESS NOTES
Subjective     Zuleyma Reed is a 49 year old female who presents to clinic today for the following health issues:    HPI     1. Forms to be filled out    Depression and Anxiety Follow-Up    How are you doing with your depression since your last visit? No change    How are you doing with your anxiety since your last visit?  No change    Are you having other symptoms that might be associated with depression or anxiety? No    Have you had a significant life event? No     Do you have any concerns with your use of alcohol or other drugs? No  Started partial hospitalization and could not stay because was ill   Will be calling back on Monday to get back in program   Needs form completed for   Saw psychiatrist at program and they took her off Wellbutrin   They request she be screened for ADHD so needs referral for testing  Has appointment on 10/22 with psychiatry at the [facility]   Will be calling Monday to get back into the partial hospitalization program     Social History     Tobacco Use     Smoking status: Former Smoker     Packs/day: 0.00     Years: 0.00     Pack years: 0.00     Smokeless tobacco: Never Used   Substance Use Topics     Alcohol use: Yes     Drug use: No     PHQ-9 SCORE 9/6/2019 9/19/2019 10/4/2019   PHQ-9 Total Score MyChart - - -   PHQ-9 Total Score 19 22 25       FAVIAN-7 SCORE 7/22/2019 9/6/2019 10/4/2019   Total Score 5 14 13       [facility] Follow-up to PHQ 9/6/2019 9/19/2019 10/4/2019   PHQ-9 9. Suicide Ideation past 2 weeks More than half the days More than half the days More than half the days   Thoughts of suicide or self harm in past 2 weeks - - -   Thoughts of suicide or self harm in past 2 weeks - - -   PHQ-9 Self harm plan? - - -   PHQ-9 Self harm action? - - -   PHQ-9 Safety concerns? - - -   PHQ-9 Self harm plan? - - -   PHQ-9 Self harm action? - - -   PHQ-9 Safety concerns? - - -         FAVIAN-7 SCORE 7/1/2019 7/22/2019 9/6/2019   Total Score 5 5 14     Also, she has additional complaints of  :  PROBLEMS TO ADD ON...  Was just seen a couple a days ago and was started on medication for upper respiratory infection and is still coughing       In the past two weeks have you had thoughts of suicide or self-harm?  Yes  In the past two weeks have you thought of a plan or intent to harm yourself? No.  Do you have concerns about your personal safety or the safety of others?   No    Suicide Assessment Five-step Evaluation and Treatment (SAFE-T)      How many servings of fruits and vegetables do you eat daily?  0-1    On average, how many sweetened beverages do you drink each day (soda, juice, sweet tea, etc)?   1    How many days per week do you miss taking your medication? 0    Patient Active Problem List   Diagnosis     Depression with anxiety     Allergic rhinitis     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     Dysthymic disorder     Family history of autoimmune disorder     Generalized anxiety disorder     IUD (intrauterine device) in place     Major depressive disorder, recurrent episode (H)     Primary localized osteoarthrosis, lower leg     Tension type headache     MDD (major depressive disorder)     Past Surgical History:   Procedure Laterality Date     ORTHOPEDIC SURGERY         Social History     Tobacco Use     Smoking status: Former Smoker     Packs/day: 0.00     Years: 0.00     Pack years: 0.00     Smokeless tobacco: Never Used   Substance Use Topics     Alcohol use: Yes     Family History   Problem Relation Age of Onset     Lupus Father      Autoimmune Disease Paternal Aunt         ALS     Autoimmune Disease Paternal Aunt         crohn's     Suicide Maternal Great-Grandmother          Current Outpatient Medications   Medication Sig Dispense Refill     cyclobenzaprine (FLEXERIL) 5 MG tablet May take 1 or 2 tablets 3 times a day as needed for muscle spasm and pain. Sedating. Preferably take at bedtime 90 tablet 0     fluticasone (FLONASE) 50 MCG/ACT spray Spray 1-2 sprays into both nostrils  daily 1 Bottle 0     guaiFENesin-codeine (ROBITUSSIN AC) 100-10 MG/5ML solution Take 5-10 mLs by mouth nightly as needed 60 mL 0     hydrOXYzine (ATARAX) 25 MG tablet Take 1-2 tablets (25-50 mg) by mouth every 8 hours as needed for anxiety 30 tablet 1     levonorgestrel (MIRENA) 20 MCG/24HR IUD 1 each by Intrauterine route once       polyethylene glycol (MIRALAX/GLYCOLAX) packet Take 17 g by mouth daily 10 packet 0     Pseudoephedrine-Naproxen Na (SINUS & COLD-D PO)        buPROPion (WELLBUTRIN SR) 150 MG 12 hr tablet TAKE 1 TABLET BY MOUTH TWICE A DAY (Patient not taking: Reported on 10/4/2019) 60 tablet 1     No Known Allergies  BP Readings from Last 3 Encounters:   10/04/19 (!) 142/90   10/01/19 130/82   09/06/19 (!) 156/98    Wt Readings from Last 3 Encounters:   10/04/19 87.8 kg (193 lb 9.6 oz)   10/01/19 88 kg (194 lb 1.6 oz)   09/06/19 83.9 kg (185 lb)           Reviewed and updated as needed this visit by Provider         Review of Systems   Constitutional: Positive for fatigue. Negative for activity change, appetite change and fever.   HENT: Positive for congestion, postnasal drip, rhinorrhea, sneezing and sore throat. Negative for ear pain and facial swelling.    Eyes: Negative.    Respiratory: Positive for cough and wheezing. Negative for chest tightness and shortness of breath.    Cardiovascular: Negative for chest pain and palpitations.   Gastrointestinal: Negative for abdominal pain, diarrhea, nausea and vomiting.   Endocrine: Negative for polydipsia, polyphagia and polyuria.   Genitourinary: Negative.    Musculoskeletal: Negative for arthralgias, joint swelling, myalgias, neck pain and neck stiffness.   Skin: Negative.    Allergic/Immunologic: Positive for environmental allergies. Negative for immunocompromised state.   Neurological: Negative for dizziness, seizures, syncope, light-headedness, numbness and paresthesias.   Hematological: Negative for adenopathy.   Psychiatric/Behavioral: Positive for  decreased concentration, dysphoric mood, mood changes, sleep disturbance and suicidal ideas. Negative for agitation, confusion, hallucinations and self-injury. The patient is nervous/anxious.             Objective    BP (!) 142/90 (BP Location: Right arm, Patient Position: Sitting, Cuff Size: Adult Regular)   Pulse 84   Temp 99.6  F (37.6  C) (Temporal)   Wt 87.8 kg (193 lb 9.6 oz)   LMP  (LMP Unknown)   SpO2 99%   Breastfeeding? No   BMI 29.01 kg/m    Body mass index is 29.01 kg/m .\  Wt Readings from Last 4 Encounters:   10/04/19 87.8 kg (193 lb 9.6 oz)   10/01/19 88 kg (194 lb 1.6 oz)   09/06/19 83.9 kg (185 lb)   09/06/19 84.2 kg (185 lb 9.6 oz)       Physical Exam   GENERAL APPEARANCE: alert, no distress, crying and fatigued  NECK: no adenopathy and no asymmetry, masses, or scars  RESP: normal respiratory rate and rhythm, lungs clear to auscultation  unlabored respirations, no intercostal retractions or accessory muscle use, barky cough  CV: regular rates and rhythm and no murmur, click or rub  MS: extremities normal- no gross deformities noted  SKIN: no suspicious lesions or rashes  NEURO: Normal strength and tone, mentation intact and speech normal  PSYCH: mentation appears normal, patient appearance--, affect flat, anxious, crying, fatigued and worried  MENTAL STATUS EXAM:  Appearance/Behavior: No apparent distress, Casually groomed and Dressed appropriately for weather  Speech: Normal  Mood/Affect: depressed affect, anxiety, flat and labile  Insight: Adequate and Fair    Diagnostic Test Results:  Labs reviewed in Epic        Assessment & Plan     Zuleyma was seen today for depression and anxiety.    Diagnoses and all orders for this visit:    Episode of recurrent major depressive disorder, unspecified depression episode severity (H)  - -     MENTAL HEALTH REFERRAL  - Adult; Assessments and Testing; ADHD; FMG: [facility] [phone]; We will contact you to schedule the appointment or  please call with any questions  Reviewed concept of depression as function of biochemical imbalance of neurotransmitters/rationale for treatment.  Risks and benefits of medication(s) reviewed with patient.  Questions answered.  Counseling advised and patient to call back on Monday to restart the partial hospitalization program   Refer to psychiatrist in place   Refer to psychologist for ADHD testing   Patient instructed to call for significant side effects medications or problems  Patient advised immediate presentation to hospital for suicidal thought, etc.  No-harm verbal contract agreed upon      Generalized anxiety disorder  -     MENTAL HEALTH REFERRAL  - Adult; Assessments and Testing; ADHD; FM[facility]: [facility] [phone]; We will contact you to schedule the appointment or please call with any questions    Poor concentration  -     MENTAL HEALTH REFERRAL  - Adult; Assessments and Testing; ADHD; [facility]: [facility] [phone]; We will contact you to schedule the appointment or please call with any questions    Acute bronchitis, unspecified organism  Continue current medication regimen unchanged.  Symptomatic therapy suggested: rest, increase fluids, apply heat to sinuses prn, use mist of vaporizer prn, OTC Robitussin DM and call prn if symptoms persist or worsen.    Laryngitis  Continue current medication regimen unchanged.  Symptomatic therapy suggested: rest, increase fluids, apply heat to sinuses prn, use mist of vaporizer prn, OTC Robitussin DM and call prn if symptoms persist or worsen.      See Patient Instructions  Patient Instructions     PLAN:   1.   Symptomatic therapy suggested: rest, increase fluids, apply heat to sinuses prn, use mist of vaporizer prn, OTC Robitussin DM and call prn if symptoms persist or worsen.  2.  Orders Placed This Encounter   Procedures     MENTAL HEALTH REFERRAL  - Adult; Assessments and Testing; ADHD; FMG: [facility] [phone]  [phone]; We will contact you to schedule the appointment or please call with any questions     3. Patient needs to follow up in if no improvement,or sooner if worsening of symptoms or other symptoms develop.  CONSULTATION/REFERRAL to mental health for Adhd testing and psychiatry   Let me know about date of partial hospitalization program     30 min spent in direct face to face time with this pt, greater than 50% in counseling and coordination of care.      No follow-ups on file.    BERTRAM [facility]

## 2019-10-04 NOTE — Clinical Note
Jose Bower Can you do a check on her Saw her Friday and says had to drop the partial hospitalization due to illness is supposed to call back to get restarted?Something did not fit with this story Thanks Rosy

## 2019-10-05 ASSESSMENT — ANXIETY QUESTIONNAIRES: GAD7 TOTAL SCORE: 13

## 2019-10-07 ENCOUNTER — TELEPHONE (OUTPATIENT)
Dept: BEHAVIORAL HEALTH | Facility: CLINIC | Age: 49
End: 2019-10-07

## 2019-10-07 ASSESSMENT — ENCOUNTER SYMPTOMS
POLYDIPSIA: 0
AGITATION: 0
FEVER: 0
NUMBNESS: 0
CHEST TIGHTNESS: 0
FACIAL SWELLING: 0
LIGHT-HEADEDNESS: 0
POLYPHAGIA: 0
SORE THROAT: 1
WHEEZING: 1
HALLUCINATIONS: 0
APPETITE CHANGE: 0
DYSPHORIC MOOD: 1
ARTHRALGIAS: 0
RHINORRHEA: 1
EYES NEGATIVE: 1
COUGH: 1
NAUSEA: 0
PARESTHESIAS: 0
NECK PAIN: 0
NERVOUS/ANXIOUS: 1
JOINT SWELLING: 0
SLEEP DISTURBANCE: 1
NECK STIFFNESS: 0
ABDOMINAL PAIN: 0
DIZZINESS: 0
DECREASED CONCENTRATION: 1
PALPITATIONS: 0
ACTIVITY CHANGE: 0
VOMITING: 0
MYALGIAS: 0
SEIZURES: 0
FATIGUE: 1
ADENOPATHY: 0
SHORTNESS OF BREATH: 0
DIARRHEA: 0
CONFUSION: 0

## 2019-10-07 NOTE — TELEPHONE ENCOUNTER
Zuleyma called to say that she is interested in returning to the Partial Program however she has been ill with bronchitis. She continues to be sick and this was evident in her voice. Let her know to call us back when she is better and we will assess her for the program. I asked if she had followed up on referral to nearby DBT program (client lives in [place]) that she received from primary therapist Darrick Garland. She reports that she prefers to come to [employer] because she works for [employer] and that she does not mind driving.

## 2019-10-09 ENCOUNTER — TELEPHONE (OUTPATIENT)
Dept: PEDIATRICS | Facility: CLINIC | Age: 49
End: 2019-10-09

## 2019-10-09 NOTE — TELEPHONE ENCOUNTER
Hannah Parker CNP completed [employer] and [employer]ML[employer] forms that were given to provider at last OV.     Faxed forms to appropriate numbers    [employer] forms faxed to [phone]    [employer] FML[employer] forms faxed to [phone]    Received confirmation from [facility] that fax was sent successfully.    Called patient on home number to inform and advised to  copy of forms at clinic check in desk C at upcoming appointment. Patient states understanding and agrees to plan.  Future Office Visit:   Next 5 appointments (look out 90 days)    Oct 11, 2019  3:30 PM CDT  [facility] with Geeta Adam MD  [facility] ([facility] [id number] [place] [id number] [phone]         Mirian ANGULO CMA

## 2019-10-17 ENCOUNTER — E-VISIT (OUTPATIENT)
Dept: PEDIATRICS | Facility: CLINIC | Age: 49
End: 2019-10-17
Payer: COMMERCIAL

## 2019-10-17 DIAGNOSIS — J01.01 ACUTE RECURRENT MAXILLARY SINUSITIS: Primary | ICD-10-CM

## 2019-10-17 PROCEDURE — 99444 ZZC PHYSICIAN ONLINE EVALUATION & MANAGEMENT SERVICE: CPT | Performed by: FAMILY MEDICINE

## 2019-10-17 RX ORDER — PREDNISONE 20 MG/1
20 TABLET ORAL DAILY
Qty: 5 TABLET | Refills: 0 | Status: ON HOLD | OUTPATIENT
Start: 2019-10-17 | End: 2019-10-30

## 2019-10-22 ENCOUNTER — HOSPITAL ENCOUNTER (INPATIENT)
Facility: CLINIC | Age: 49
LOS: 9 days | Discharge: HOME | DRG: 885 | End: 2019-10-31
Attending: PSYCHIATRY & NEUROLOGY | Admitting: PSYCHIATRY & NEUROLOGY
Payer: COMMERCIAL

## 2019-10-22 ENCOUNTER — OFFICE VISIT (OUTPATIENT)
Dept: PSYCHIATRY | Facility: CLINIC | Age: 49
End: 2019-10-22
Attending: NURSE PRACTITIONER
Payer: COMMERCIAL

## 2019-10-22 ENCOUNTER — CARE COORDINATION (OUTPATIENT)
Dept: PSYCHIATRY | Facility: CLINIC | Age: 49
End: 2019-10-22

## 2019-10-22 VITALS
HEART RATE: 90 BPM | WEIGHT: 199.8 LBS | DIASTOLIC BLOOD PRESSURE: 88 MMHG | SYSTOLIC BLOOD PRESSURE: 142 MMHG | BODY MASS INDEX: 29.94 KG/M2

## 2019-10-22 DIAGNOSIS — F33.2 SEVERE EPISODE OF RECURRENT MAJOR DEPRESSIVE DISORDER, WITHOUT PSYCHOTIC FEATURES (H): ICD-10-CM

## 2019-10-22 DIAGNOSIS — M71.9 DISORDER OF BURSAE AND TENDONS IN SHOULDER REGION: ICD-10-CM

## 2019-10-22 DIAGNOSIS — R45.851 SUICIDAL IDEATION: Primary | ICD-10-CM

## 2019-10-22 DIAGNOSIS — F17.200 TOBACCO DEPENDENCE SYNDROME: ICD-10-CM

## 2019-10-22 DIAGNOSIS — M67.919 DISORDER OF BURSAE AND TENDONS IN SHOULDER REGION: ICD-10-CM

## 2019-10-22 DIAGNOSIS — J30.1 SEASONAL ALLERGIC RHINITIS DUE TO POLLEN: ICD-10-CM

## 2019-10-22 DIAGNOSIS — F33.2 SEVERE EPISODE OF RECURRENT MAJOR DEPRESSIVE DISORDER, WITHOUT PSYCHOTIC FEATURES (H): Primary | ICD-10-CM

## 2019-10-22 DIAGNOSIS — F41.1 GENERALIZED ANXIETY DISORDER: ICD-10-CM

## 2019-10-22 DIAGNOSIS — F41.9 ANXIETY: ICD-10-CM

## 2019-10-22 DIAGNOSIS — G44.209 ACUTE NON INTRACTABLE TENSION-TYPE HEADACHE: ICD-10-CM

## 2019-10-22 PROCEDURE — 25000132 ZZH RX MED GY IP 250 OP 250 PS 637: Performed by: STUDENT IN AN ORGANIZED HEALTH CARE EDUCATION/TRAINING PROGRAM

## 2019-10-22 PROCEDURE — G0463 HOSPITAL OUTPT CLINIC VISIT: HCPCS | Mod: ZF

## 2019-10-22 PROCEDURE — 12400001 ZZH R&B MH UMMC

## 2019-10-22 PROCEDURE — 93005 ELECTROCARDIOGRAM TRACING: CPT

## 2019-10-22 PROCEDURE — 93010 ELECTROCARDIOGRAM REPORT: CPT | Performed by: INTERNAL MEDICINE

## 2019-10-22 RX ORDER — PREDNISONE 5 MG/1
20 TABLET ORAL DAILY
Status: DISPENSED | OUTPATIENT
Start: 2019-10-23 | End: 2019-10-28

## 2019-10-22 RX ORDER — TRAZODONE HYDROCHLORIDE 50 MG/1
50 TABLET, FILM COATED ORAL
Status: DISCONTINUED | OUTPATIENT
Start: 2019-10-22 | End: 2019-10-31 | Stop reason: HOSPADM

## 2019-10-22 RX ORDER — IBUPROFEN 600 MG/1
600 TABLET, FILM COATED ORAL EVERY 6 HOURS PRN
Status: DISCONTINUED | OUTPATIENT
Start: 2019-10-22 | End: 2019-10-28

## 2019-10-22 RX ORDER — HYDROXYZINE HYDROCHLORIDE 25 MG/1
25 TABLET, FILM COATED ORAL EVERY 4 HOURS PRN
Status: DISCONTINUED | OUTPATIENT
Start: 2019-10-22 | End: 2019-10-31 | Stop reason: HOSPADM

## 2019-10-22 RX ORDER — OLANZAPINE 10 MG/2ML
10 INJECTION, POWDER, FOR SOLUTION INTRAMUSCULAR
Status: DISCONTINUED | OUTPATIENT
Start: 2019-10-22 | End: 2019-10-31 | Stop reason: HOSPADM

## 2019-10-22 RX ORDER — POLYETHYLENE GLYCOL 3350 17 G
2-4 POWDER IN PACKET (EA) ORAL
Status: DISCONTINUED | OUTPATIENT
Start: 2019-10-22 | End: 2019-10-31 | Stop reason: HOSPADM

## 2019-10-22 RX ORDER — CYCLOBENZAPRINE HCL 5 MG
5-10 TABLET ORAL 3 TIMES DAILY PRN
Status: DISCONTINUED | OUTPATIENT
Start: 2019-10-22 | End: 2019-10-31 | Stop reason: HOSPADM

## 2019-10-22 RX ORDER — ACETAMINOPHEN 325 MG/1
650 TABLET ORAL EVERY 4 HOURS PRN
Status: DISCONTINUED | OUTPATIENT
Start: 2019-10-22 | End: 2019-10-31 | Stop reason: HOSPADM

## 2019-10-22 RX ORDER — ALUMINA, MAGNESIA, AND SIMETHICONE 2400; 2400; 240 MG/30ML; MG/30ML; MG/30ML
30 SUSPENSION ORAL EVERY 4 HOURS PRN
Status: DISCONTINUED | OUTPATIENT
Start: 2019-10-22 | End: 2019-10-31 | Stop reason: HOSPADM

## 2019-10-22 RX ORDER — OLANZAPINE 10 MG/1
10 TABLET ORAL
Status: DISCONTINUED | OUTPATIENT
Start: 2019-10-22 | End: 2019-10-31 | Stop reason: HOSPADM

## 2019-10-22 RX ADMIN — CYCLOBENZAPRINE HYDROCHLORIDE 10 MG: 5 TABLET, FILM COATED ORAL at 20:40

## 2019-10-22 RX ADMIN — AMOXICILLIN AND CLAVULANATE POTASSIUM 1 TABLET: 875; 125 TABLET, FILM COATED ORAL at 20:40

## 2019-10-22 RX ADMIN — SALINE NASAL SPRAY 1 SPRAY: 1.5 SOLUTION NASAL at 20:18

## 2019-10-22 RX ADMIN — ACETAMINOPHEN 650 MG: 325 TABLET, FILM COATED ORAL at 18:06

## 2019-10-22 ASSESSMENT — ACTIVITIES OF DAILY LIVING (ADL)
AMBULATION: 0-->INDEPENDENT
FALL_HISTORY_WITHIN_LAST_SIX_MONTHS: NO
DRESS: INDEPENDENT
COGNITION: 0 - NO COGNITION ISSUES REPORTED
ORAL_HYGIENE: INDEPENDENT
SWALLOWING: 0-->SWALLOWS FOODS/LIQUIDS WITHOUT DIFFICULTY
RETIRED_COMMUNICATION: 0-->UNDERSTANDS/COMMUNICATES WITHOUT DIFFICULTY
TOILETING: 0-->INDEPENDENT
HYGIENE/GROOMING: INDEPENDENT
BATHING: 0-->INDEPENDENT
RETIRED_EATING: 0-->INDEPENDENT
TRANSFERRING: 0-->INDEPENDENT
DRESS: 0-->INDEPENDENT

## 2019-10-22 ASSESSMENT — PAIN SCALES - GENERAL: PAINLEVEL: MODERATE PAIN (4)

## 2019-10-22 ASSESSMENT — MIFFLIN-ST. JEOR: SCORE: 1579.13

## 2019-10-22 NOTE — PROGRESS NOTES
Writer received word from provider that pt needs hospitalization.    What are the patient's current symptoms that warrant hospitalization: significant SI, currently unmedicated, uncertain safety of medication supply at home.  Can the patient contract for safety on the unit: N/A  Is the patient being admitted voluntarily: Yes  Is the patient medically cleared: Yes. She is ambulatory and has been on medications for bronchitis > 24 hours.  Does the patient have any medical conditions that will require attention on the unit: N/A  Can the patient have a roommate: Yes  Does the patient have any relevant hospitalization or self-harm / suicide attempt history: history of suicide attempt in January 2019, history of hospitalization in 2003 and earlier.    Writer contacted hospital intake and coordinated for an open bed on Dr. Rodriguez's unit. Provided contact info for provider for doc-to-doc communication.      Writer received call from Dr. Gustavo Pak ([phone]) requesting additional information, which writer provided and coordinated for clinic provider to contact Dr. Pak.      [facility] received call form Dr. Pak, accepting pt on station 20.  Writer accompanied pt with security to stn 20, coordinated with charge nurse John to provide report.
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
## 2019-10-23 ENCOUNTER — TELEPHONE (OUTPATIENT)
Dept: PSYCHIATRY | Facility: CLINIC | Age: 49
End: 2019-10-23

## 2019-10-23 DIAGNOSIS — F33.2 SEVERE RECURRENT MAJOR DEPRESSION WITHOUT PSYCHOTIC FEATURES (H): Primary | ICD-10-CM

## 2019-10-23 LAB
ALBUMIN SERPL-MCNC: 3.6 G/DL (ref 3.4–5)
ALP SERPL-CCNC: 61 U/L (ref 40–150)
ALT SERPL W P-5'-P-CCNC: 20 U/L (ref 0–50)
ANION GAP SERPL CALCULATED.3IONS-SCNC: 5 MMOL/L (ref 3–14)
AST SERPL W P-5'-P-CCNC: 8 U/L (ref 0–45)
BILIRUB SERPL-MCNC: 0.9 MG/DL (ref 0.2–1.3)
BUN SERPL-MCNC: 11 MG/DL (ref 7–30)
CALCIUM SERPL-MCNC: 8.5 MG/DL (ref 8.5–10.1)
CHLORIDE SERPL-SCNC: 107 MMOL/L (ref 94–109)
CHOLEST SERPL-MCNC: 209 MG/DL
CO2 SERPL-SCNC: 27 MMOL/L (ref 20–32)
CREAT SERPL-MCNC: 0.85 MG/DL (ref 0.52–1.04)
ERYTHROCYTE [DISTWIDTH] IN BLOOD BY AUTOMATED COUNT: 13.2 % (ref 10–15)
FOLATE SERPL-MCNC: 27.5 NG/ML
GFR SERPL CREATININE-BSD FRML MDRD: 81 ML/MIN/{1.73_M2}
GLUCOSE SERPL-MCNC: 96 MG/DL (ref 70–99)
HCT VFR BLD AUTO: 45.1 % (ref 35–47)
HDLC SERPL-MCNC: 58 MG/DL
HGB BLD-MCNC: 15 G/DL (ref 11.7–15.7)
INTERPRETATION ECG - MUSE: NORMAL
LDLC SERPL CALC-MCNC: 113 MG/DL
MCH RBC QN AUTO: 30.8 PG (ref 26.5–33)
MCHC RBC AUTO-ENTMCNC: 33.3 G/DL (ref 31.5–36.5)
MCV RBC AUTO: 93 FL (ref 78–100)
NONHDLC SERPL-MCNC: 151 MG/DL
PLATELET # BLD AUTO: 267 10E9/L (ref 150–450)
POTASSIUM SERPL-SCNC: 4.1 MMOL/L (ref 3.4–5.3)
PROT SERPL-MCNC: 7.1 G/DL (ref 6.8–8.8)
RBC # BLD AUTO: 4.87 10E12/L (ref 3.8–5.2)
SODIUM SERPL-SCNC: 139 MMOL/L (ref 133–144)
TRIGL SERPL-MCNC: 191 MG/DL
TSH SERPL DL<=0.005 MIU/L-ACNC: 3.34 MU/L (ref 0.4–4)
VIT B12 SERPL-MCNC: 475 PG/ML (ref 193–986)
WBC # BLD AUTO: 9.3 10E9/L (ref 4–11)

## 2019-10-23 PROCEDURE — 25000125 ZZHC RX 250: Performed by: STUDENT IN AN ORGANIZED HEALTH CARE EDUCATION/TRAINING PROGRAM

## 2019-10-23 PROCEDURE — 25000132 ZZH RX MED GY IP 250 OP 250 PS 637: Performed by: STUDENT IN AN ORGANIZED HEALTH CARE EDUCATION/TRAINING PROGRAM

## 2019-10-23 PROCEDURE — 80053 COMPREHEN METABOLIC PANEL: CPT | Performed by: PSYCHIATRY & NEUROLOGY

## 2019-10-23 PROCEDURE — 82746 ASSAY OF FOLIC ACID SERUM: CPT | Performed by: PSYCHIATRY & NEUROLOGY

## 2019-10-23 PROCEDURE — 36415 COLL VENOUS BLD VENIPUNCTURE: CPT | Performed by: PSYCHIATRY & NEUROLOGY

## 2019-10-23 PROCEDURE — 82607 VITAMIN B-12: CPT | Performed by: PSYCHIATRY & NEUROLOGY

## 2019-10-23 PROCEDURE — 80061 LIPID PANEL: CPT | Performed by: PSYCHIATRY & NEUROLOGY

## 2019-10-23 PROCEDURE — 25000131 ZZH RX MED GY IP 250 OP 636 PS 637: Performed by: STUDENT IN AN ORGANIZED HEALTH CARE EDUCATION/TRAINING PROGRAM

## 2019-10-23 PROCEDURE — 12400001 ZZH R&B MH UMMC

## 2019-10-23 PROCEDURE — 85027 COMPLETE CBC AUTOMATED: CPT | Performed by: PSYCHIATRY & NEUROLOGY

## 2019-10-23 PROCEDURE — 90853 GROUP PSYCHOTHERAPY: CPT

## 2019-10-23 PROCEDURE — 99223 1ST HOSP IP/OBS HIGH 75: CPT | Mod: AI | Performed by: PSYCHIATRY & NEUROLOGY

## 2019-10-23 PROCEDURE — 84443 ASSAY THYROID STIM HORMONE: CPT | Performed by: PSYCHIATRY & NEUROLOGY

## 2019-10-23 RX ORDER — OXYMETAZOLINE HYDROCHLORIDE 0.05 G/100ML
2 SPRAY NASAL 2 TIMES DAILY PRN
Status: DISCONTINUED | OUTPATIENT
Start: 2019-10-23 | End: 2019-10-31 | Stop reason: HOSPADM

## 2019-10-23 RX ADMIN — AMOXICILLIN AND CLAVULANATE POTASSIUM 1 TABLET: 875; 125 TABLET, FILM COATED ORAL at 08:38

## 2019-10-23 RX ADMIN — AMOXICILLIN AND CLAVULANATE POTASSIUM 1 TABLET: 875; 125 TABLET, FILM COATED ORAL at 21:08

## 2019-10-23 RX ADMIN — OXYMETAZOLINE HYDROCHLORIDE 2 SPRAY: 0.05 SPRAY NASAL at 19:11

## 2019-10-23 RX ADMIN — ACETAMINOPHEN 650 MG: 325 TABLET, FILM COATED ORAL at 12:40

## 2019-10-23 RX ADMIN — PREDNISONE 20 MG: 5 TABLET ORAL at 08:37

## 2019-10-23 RX ADMIN — CYCLOBENZAPRINE HYDROCHLORIDE 10 MG: 5 TABLET, FILM COATED ORAL at 21:08

## 2019-10-23 RX ADMIN — IBUPROFEN 600 MG: 600 TABLET ORAL at 19:11

## 2019-10-23 ASSESSMENT — ACTIVITIES OF DAILY LIVING (ADL)
ORAL_HYGIENE: INDEPENDENT
DRESS: INDEPENDENT
HYGIENE/GROOMING: INDEPENDENT

## 2019-10-23 ASSESSMENT — PATIENT HEALTH QUESTIONNAIRE - PHQ9: SUM OF ALL RESPONSES TO PHQ QUESTIONS 1-9: 26
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
## 2019-10-23 NOTE — PROGRESS NOTES
"   10/23/19 2729   Behavioral Health   Hallucinations denies / not responding to hallucinations   Thinking poor concentration   Orientation date: oriented   Memory baseline memory   Insight admits / accepts;insight appropriate to situation   Judgement intact   Eye Contact at examiner   Affect full range affect   Mood mood is calm   Physical Appearance/Attire neat   Hygiene well groomed   Suicidality thoughts only   1. Wish to be Dead (Past Month) Yes   Wish to be Dead Description (Recent)   (None)   5. Active Suicidal Ideation with Specific Plan and Intent (Past Month) Yes   Self Injury thoughts only   Elopement   (None)   Activity   (Fairly active)   Medication Sensitivity   (None)   Psychomotor / Gait balanced;steady     Pt attended some groups and ate meals. Pt took shower. Pt presents passive suicidal thoughts, rates her depression and anxiety at 8. Pt mentions she's less hopeful. Pt states, \" I think I have given up trying because I have exhausted options and just don't know what to do\". Pt mentions she has discussed other options with her doctor but not concluded till tomorrow. Overall, pt seems guided at times. Pt is however nice pleasant and cooperative.   "

## 2019-10-23 NOTE — PROGRESS NOTES
10/22/19 1933   Patient Belongings   Did you bring any home meds/supplements to the hospital?  No   Patient Belongings locker;sent to security per site process   Patient Belongings Put in Hospital Secure Location (Security or Locker, etc.) clothing   Belongings Search Yes   Clothing Search Yes   Second Staff Sonam PUENTE     BELONGING:  Pair of white socks   Green olive sweater   Black/white colored sketchers sneakers   Gray fleece jacket   Blue melvin   Car key x2  Black whistle  chapstick   Sticky note  One yellow glove  Black/pink colored watch   Cell phone in aqua case   Brown crossover bag   Lighter   Lipsticks x3  Work ID x2   [email or web address]   Papers/receipts   Mails   Lip glossx4    Sent to security ENV# [id number]    $145 in cash   $5.78 in loose change   Visa debit [id number]  [place] 's license   [facility] [id number]  [employer] mastercard [id number]  [employer] Red[employer] [id number]  sandee's [id number]     A               Admission:  I am responsible for any personal items that are not sent to the safe or pharmacy.  [facility] is not responsible for loss, theft or damage of any property in my possession.    Signature:  _________________________________ Date: _______  Time: _____                                              Staff Signature:  ____________________________ Date: ________  Time: _____      2nd Staff person, if patient is unable/unwilling to sign:    Signature: ________________________________ Date: ________  Time: _____     Discharge:  [facility] has returned all of my personal belongings:    Signature: _________________________________ Date: ________  Time: _____                                          Staff Signature:  ____________________________ Date: ________  Time: _____
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
## 2019-10-23 NOTE — TELEPHONE ENCOUNTER
On 10/22/2019,  Zuleyma Derek ,  1970, arrived for Gene Sight Test.  Obtained buccal swab of both cheeks and patient's insurance information. Patient signed Patient Consent form and verified information on Cheek Swab Envelope. Ordering clinician,Crystal Reece, signed Cheek Swab Envelope. Order placed with [employer]. [employer] scheduled to  overnight envelope..Maddy Amin LPN

## 2019-10-23 NOTE — PLAN OF CARE
Problem: General Plan of Care (Inpatient Behavioral)  Goal: Individualization/Patient Specific Goal (IP Behavioral)  Description  Team Plan:  Flowsheets (Taken 10/23/2019 6014)  Patient Strengths: Steady employment; Stable and supportive family; Stable housing  Note:   Patient's goals:  Feel better    Plan for admission:  1. Stabilization of mood disorder symptoms  2. Absence of SI- safe with self  3. Medication management per MD's  4. Coordination of care with outpatient providers, family  5. Psychiatric follow up care in place
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
## 2019-10-23 NOTE — PROGRESS NOTES
"Psychiatry Cross Cover Note    Subjective: Notified by staff that patient continues to experience significant nasal congestion. Reports nasal saline spray is not effective. Notes she has a nasal spray at home that contains prednisone.    Objective: BP (!) 146/76   Pulse 90   Temp 98.9  F (37.2  C) (Oral)   Resp 16   Ht 1.74 m (5' 8.5\")   Wt 88.8 kg (195 lb 11.2 oz)   LMP  (LMP Unknown)   SpO2 100%   BMI 29.32 kg/m      Assessment/Plan:  Given ongoing nasal congestion without benefit from nasal saline, offered patient Afrin nasal spray. Encouraged her to speak with primary team in the morning if congestions continues.    Giovani Plunkett MD  PGY-2 Psychiatry Resident      "

## 2019-10-23 NOTE — PROGRESS NOTES
10/23/19 1500   Occupational Therapy   Type of Intervention structured groups   Response Initiates, socially acceptable   Hours 0.5     Attended ~20 minutes of both occupational therapy groups offered this date. Overall content and cooperative.      Pt's first attendance in Occupational Therapy Clinic. Pt Response: I to initiate, gather materials, sequence and adjust to workspace demands as needed. Needs further assessment of focus, planning, and problem solving. Able to ask for assistance as needed and appeared comfortable in the presence of peers and staff.     Mental health management group with a focus on coping through expressing thoughts/feelings and movement to facilitate relaxation and stress management. Pt Response: followed and engaged in all acupressure strategies and guided meditation. Reported having racing thoughts during meditation which she wished to control. Writer and peer provided encouraging words and positive feedback as a reflection.

## 2019-10-23 NOTE — PLAN OF CARE
BEHAVIORAL TEAM DISCUSSION    Participants:   Dr. Rodriguez, Dr. Downing, Dr. Pak, Zuleyma Love MA.ROSE, Jyotsna Mckeon RN, Med student    Anticipated length of stay:   5-7 days    Continued Stay Criteria/Rationale:   Worsening depression, SI    Medical/Physical:   Bronchitis    Precautions:   Behavioral Orders   Procedures     Code 1 - Restrict to Unit     Routine Programming     As clinically indicated     Status 15     Every 15 minutes.     Suicide precautions     Patients on Suicide Precautions should have a Combination Diet ordered that includes a Diet selection(s) AND a Behavioral Tray selection for Safe Tray - with utensils, or Safe Tray - NO utensils       Plan:   Patient will have ongoing psychiatric assessment.  Medications will be reviewed and adjusted per MD as indicated.  Additional treatment options will be discussed.  Outpatient providers will be contacted for care coordination.  Hospital staff will provide a safe environment and a therapeutic milieu. Patient will be encouraged to participate in unit groups and activities.   CTC will continue to assess needs and  ensure appropriate follow up care is in place.       Rationale for change in precautions or plan:   No change in plan/precautions

## 2019-10-23 NOTE — PLAN OF CARE
[facility] Pt has active suicidal ideation but no plan. She has a history of depression and anxiety. Currently, she has bronchitis was treated but symptoms persisted. Currently she is on Augmentin for bronchitis and sinusitis. She had diarrhea for a few days. ID was consulted. Liseth from ID recommended private room and contact precaution if she still has diarrhea. Pt stated that she does not have diarrhea now. Pt stated that she did not have self-harm thoughts. She did not have auditory or visual hallucination. She is afraid that she is losing her job at [employer]. She feels helplessness and hopelessness. Staff will continue to monitor pt per care plan. Medicate per physician order.

## 2019-10-24 ENCOUNTER — APPOINTMENT (OUTPATIENT)
Dept: GENERAL RADIOLOGY | Facility: CLINIC | Age: 49
DRG: 885 | End: 2019-10-24
Attending: PSYCHIATRY & NEUROLOGY
Payer: COMMERCIAL

## 2019-10-24 LAB — HCG UR QL: NEGATIVE

## 2019-10-24 PROCEDURE — 81025 URINE PREGNANCY TEST: CPT | Performed by: PSYCHIATRY & NEUROLOGY

## 2019-10-24 PROCEDURE — 99207 ZZC CDG-CODE CATEGORY CHANGED: CPT | Performed by: PHYSICIAN ASSISTANT

## 2019-10-24 PROCEDURE — 99232 SBSQ HOSP IP/OBS MODERATE 35: CPT | Mod: GC | Performed by: PSYCHIATRY & NEUROLOGY

## 2019-10-24 PROCEDURE — 72040 X-RAY EXAM NECK SPINE 2-3 VW: CPT

## 2019-10-24 PROCEDURE — 99221 1ST HOSP IP/OBS SF/LOW 40: CPT | Performed by: PHYSICIAN ASSISTANT

## 2019-10-24 PROCEDURE — 25000132 ZZH RX MED GY IP 250 OP 250 PS 637: Performed by: STUDENT IN AN ORGANIZED HEALTH CARE EDUCATION/TRAINING PROGRAM

## 2019-10-24 PROCEDURE — 12400001 ZZH R&B MH UMMC

## 2019-10-24 PROCEDURE — G0177 OPPS/PHP; TRAIN & EDUC SERV: HCPCS

## 2019-10-24 PROCEDURE — 25000131 ZZH RX MED GY IP 250 OP 636 PS 637: Performed by: STUDENT IN AN ORGANIZED HEALTH CARE EDUCATION/TRAINING PROGRAM

## 2019-10-24 PROCEDURE — 90853 GROUP PSYCHOTHERAPY: CPT

## 2019-10-24 RX ORDER — LIDOCAINE 4 G/G
1 PATCH TOPICAL
Status: DISCONTINUED | OUTPATIENT
Start: 2019-10-24 | End: 2019-10-31 | Stop reason: HOSPADM

## 2019-10-24 RX ORDER — VILAZODONE HYDROCHLORIDE 10 MG/1
10 TABLET ORAL DAILY
Status: DISCONTINUED | OUTPATIENT
Start: 2019-10-24 | End: 2019-10-28

## 2019-10-24 RX ADMIN — VILAZODONE HYDROCHLORIDE 10 MG: 10 TABLET ORAL at 12:13

## 2019-10-24 RX ADMIN — LIDOCAINE 1 PATCH: 560 PATCH PERCUTANEOUS; TOPICAL; TRANSDERMAL at 20:05

## 2019-10-24 RX ADMIN — AMOXICILLIN AND CLAVULANATE POTASSIUM 1 TABLET: 875; 125 TABLET, FILM COATED ORAL at 09:26

## 2019-10-24 RX ADMIN — PREDNISONE 20 MG: 5 TABLET ORAL at 09:26

## 2019-10-24 ASSESSMENT — ACTIVITIES OF DAILY LIVING (ADL)
ORAL_HYGIENE: INDEPENDENT
HYGIENE/GROOMING: INDEPENDENT
DRESS: INDEPENDENT
HYGIENE/GROOMING: INDEPENDENT
ORAL_HYGIENE: INDEPENDENT
DRESS: INDEPENDENT

## 2019-10-24 ASSESSMENT — MIFFLIN-ST. JEOR: SCORE: 1590.92

## 2019-10-24 NOTE — PROGRESS NOTES
10/23/19 2000   Group Therapy Session   Group Attendance attended group session   Total Time (minutes) 50   Group Type psychotherapeutic   Group Topic Covered other (see comments)   Patient Participation/Contribution cooperative with task;discussed personal experience with topic;listened actively;offered helpful suggestions to peers   CBT activity was utilized to help individuals identify and process a problem while considering various coping strategies.  Zuleyma presented in a pleasant, engaged mood with congruent affect. She actively participated and processed her thoughts and feelings with the group. She offered kind, supportive feedback to other group members.

## 2019-10-24 NOTE — PROGRESS NOTES
"   10/24/19 1523   General Information   Date Initially Attended OT 10/24/19     Attended 3/3 hrs of occupational therapy groups offered this date. Overall congruent affect and full engagement.      Topic group for mental health management: 'Tree of Life' focusing on insight development via metaphors: identification of various past, present, and future aspects of life that do/don't serve them throughout recovery. Pt Response: Insightful. Identified patience and kindness as a personal value however sometimes \"gets in the way\" of her own needs, referring to decreased self-cares over others. Reported loneliness and min-no physical / intimate contact with people throughout her day as significant barriers to well-being.  However, feels as though she has adequate support system including animals at home, her father, daughter, and a new friend.    Collaborative multi-step hands-on meal preparation group. 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 Demonstrated great process, performance, and collaborative social interaction skills, specifically sequencing and organization. Brightened affect during subsequent group game with reported desire to play more games on the unit.     OT staff will meet with pt to review the role of occupational therapy and explain the value of having them involved in their treatment plan including options to meet current needs/self-identified goals. As group attendance is established,  continued clinical observations will be made and Pt will be given self-assessment to inform OT initial assessment.          "
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
## 2019-10-24 NOTE — PROGRESS NOTES
Behavioral Health  Note   Behavioral Health  Spirituality Group Note     Unit 20    Name: Zuleyma Reed    YOB: 1970   MRN: [id number]    Age: 49 year old     Patient attended -led group, which included discussion of spirituality, coping with illness and building resilience.   Patient attended group for 1.0 hrs.   patient minimally participated, but was respectful to the group process.     [facility]  Staff    Page [phone]

## 2019-10-24 NOTE — PROGRESS NOTES
"Pt is visible in the milieu. Pt denies SIB, admitted to SI. Pt stated that \"every night I go to bed I hope that I don't wake up\" pt denies anxiety but stated that it's elevated based on the situation like when she has to talk to people about what's going on. Pt shared that she has issues concentrating. Pt goal is not not hide in her room and thus far she has done a good job at staying in the milieu, being social with peers and going to groups.     10/24/19 1317   Behavioral Health   Hallucinations denies / not responding to hallucinations   Thinking distractable;poor concentration   Orientation person: oriented;place: oriented;date: oriented;time: oriented   Memory baseline memory   Insight poor   Judgement impaired   Eye Contact at examiner   Affect blunted, flat   Mood mood is calm   Physical Appearance/Attire appears stated age;attire appropriate to age and situation   Hygiene well groomed   Suicidality thoughts only   1. Wish to be Dead (Past Month) Yes   Wish to be Dead Description (Recent)   (every night I go to bed I hope that I don't wake up)   Self Injury   (denies)   Activity   (social)   Speech clear;coherent   Psychomotor / Gait balanced;steady   Activities of Daily Living   Hygiene/Grooming independent   Oral Hygiene independent   Dress independent   Room Organization independent     "

## 2019-10-24 NOTE — PROGRESS NOTES
10/23/19 2200   Significant Event   Significant Event Other (see comments)  (shift summary)     Pt was visible in the milieu, watched TV, interacted with peers, participated in groups, ate dinner and independent with ADLS.  Calm, cooperative, flat/blunted affect, depressed and anxious.  No stated SI, SIB or hallucinations..

## 2019-10-25 ENCOUNTER — ANESTHESIA EVENT (OUTPATIENT)
Dept: BEHAVIORAL HEALTH | Facility: CLINIC | Age: 49
End: 2019-10-25

## 2019-10-25 ENCOUNTER — ANESTHESIA (OUTPATIENT)
Dept: BEHAVIORAL HEALTH | Facility: CLINIC | Age: 49
End: 2019-10-25

## 2019-10-25 PROCEDURE — H2032 ACTIVITY THERAPY, PER 15 MIN: HCPCS

## 2019-10-25 PROCEDURE — 25000132 ZZH RX MED GY IP 250 OP 250 PS 637: Performed by: STUDENT IN AN ORGANIZED HEALTH CARE EDUCATION/TRAINING PROGRAM

## 2019-10-25 PROCEDURE — G0177 OPPS/PHP; TRAIN & EDUC SERV: HCPCS

## 2019-10-25 PROCEDURE — 90870 ELECTROCONVULSIVE THERAPY: CPT

## 2019-10-25 PROCEDURE — 25000131 ZZH RX MED GY IP 250 OP 636 PS 637: Performed by: STUDENT IN AN ORGANIZED HEALTH CARE EDUCATION/TRAINING PROGRAM

## 2019-10-25 PROCEDURE — 25000125 ZZHC RX 250: Performed by: ANESTHESIOLOGY

## 2019-10-25 PROCEDURE — 25000125 ZZHC RX 250: Performed by: PSYCHIATRY & NEUROLOGY

## 2019-10-25 PROCEDURE — 25000128 H RX IP 250 OP 636: Performed by: ANESTHESIOLOGY

## 2019-10-25 PROCEDURE — 12400001 ZZH R&B MH UMMC

## 2019-10-25 RX ORDER — GLYCOPYRROLATE 0.2 MG/ML
0.2 INJECTION, SOLUTION INTRAMUSCULAR; INTRAVENOUS
Status: CANCELLED
Start: 2019-10-25

## 2019-10-25 RX ORDER — ETOMIDATE 2 MG/ML
INJECTION INTRAVENOUS PRN
Status: DISCONTINUED | OUTPATIENT
Start: 2019-10-25 | End: 2019-10-25

## 2019-10-25 RX ORDER — GLYCOPYRROLATE 0.2 MG/ML
0.2 INJECTION, SOLUTION INTRAMUSCULAR; INTRAVENOUS
Status: COMPLETED | OUTPATIENT
Start: 2019-10-25 | End: 2019-10-25

## 2019-10-25 RX ADMIN — Medication 80 MG: at 12:04

## 2019-10-25 RX ADMIN — GLYCOPYRROLATE 0.2 MG: 0.2 INJECTION, SOLUTION INTRAMUSCULAR; INTRAVENOUS at 11:52

## 2019-10-25 RX ADMIN — LIDOCAINE 1 PATCH: 560 PATCH PERCUTANEOUS; TOPICAL; TRANSDERMAL at 22:07

## 2019-10-25 RX ADMIN — METHOHEXITAL SODIUM 80 MG: 500 INJECTION, POWDER, LYOPHILIZED, FOR SOLUTION INTRAMUSCULAR; INTRAVENOUS; RECTAL at 12:13

## 2019-10-25 RX ADMIN — ACETAMINOPHEN 650 MG: 325 TABLET, FILM COATED ORAL at 18:59

## 2019-10-25 RX ADMIN — VILAZODONE HYDROCHLORIDE 10 MG: 10 TABLET ORAL at 13:17

## 2019-10-25 RX ADMIN — CYCLOBENZAPRINE HYDROCHLORIDE 10 MG: 5 TABLET, FILM COATED ORAL at 22:08

## 2019-10-25 RX ADMIN — AMOXICILLIN AND CLAVULANATE POTASSIUM 1 TABLET: 875; 125 TABLET, FILM COATED ORAL at 13:17

## 2019-10-25 RX ADMIN — OXYMETAZOLINE HYDROCHLORIDE 2 SPRAY: 0.05 SPRAY NASAL at 22:23

## 2019-10-25 RX ADMIN — AMOXICILLIN AND CLAVULANATE POTASSIUM 1 TABLET: 875; 125 TABLET, FILM COATED ORAL at 22:07

## 2019-10-25 ASSESSMENT — ACTIVITIES OF DAILY LIVING (ADL)
HYGIENE/GROOMING: INDEPENDENT
HYGIENE/GROOMING: INDEPENDENT
ORAL_HYGIENE: INDEPENDENT
DRESS: INDEPENDENT
DRESS: INDEPENDENT
ORAL_HYGIENE: INDEPENDENT
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
## 2019-10-25 NOTE — ADDENDUM NOTE
Addendum  created 10/25/19 1216 by Tana Gutierres MD    Review and Sign - Ready for Procedure, Review and Sign - Signed

## 2019-10-25 NOTE — PLAN OF CARE
"Pt was calm and pleasant upon approach. Pt presents with blunt affect and depressed. Pt had ECT #1 today and had no problems, except tiredness and being \"out of it\". Pt did not attend any groups this shift.     Pt described her mood as depressed and stated that she did not know whether she as having SI thoughts because she was so tired. Pt stated she would be able to go to staff if she was not able to control those thoughts. Pt had no complaints.   "

## 2019-10-25 NOTE — PROGRESS NOTES
"   10/24/19 2000   Group Therapy Session   Group Attendance attended group session   Total Time (minutes) 50   Group Type psychotherapeutic   Group Topic Covered other (see comments)   Patient Participation/Contribution cooperative with task;discussed personal experience with topic;listened actively;offered helpful suggestions to peers   Psychotherapy group goals: Build strengths and resilience by identifying positives about oneself \"I am, I have, I can\" and then process as a group.   Zuleyma presented in a bright mood. She actively engaged in the task and openly processed with the group. She reported feeling down this morning but her mood improved throughout the day as she socialized and played games with others.    "

## 2019-10-25 NOTE — CONSULTS
Occupational Therapy Encounter: Cognitive Screen     Testing completed for Pre-ECT baseline assessment of cognition.   Patient education provided on reason for OT evaluation and results of testing today.      [place] Cognitive Assessment (MoCA)  The MoCA is a rapid screening instrument for mild cognitive dysfunction. It assesses different cognitive domains: attention and concentration, executive functions, memory, language, visuoconstructional skills, conceptual thinking, calculations, and orientation. Memory Index Score (MIS)- able to recall words either spontaneously, with category cue, or multiple choice cues. Higher score reflective of spontaneous recall.      Pt completed the MoCA Version 8.1    Results  Visualspacial/Executive: 5/5  Naming: 3/3  Attention: 6/6  Language: 3/3  Abstraction: 2/2  Delayed Recall: 5/5  Orientation: 6/6    Education differential: NA/1    Total Score: 30/30      Interpretation:  Pt's score of 30/30 indicates normal cognitive functioning.    Normal Mild Cognitive Impairment Alzheimer's Disease   26 and above 25-19 18-11       Pt would benefit from opportunities to maintain cognitive functioning in to context of functional tasks. Encourage group attendance. MoCA repeat as indicated based on cognitive complaints and/or direct observation throughout ECT.     Cherry Almeida, OT on 10/25/2019 at 12:50 PM
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
## 2019-10-25 NOTE — ANESTHESIA POSTPROCEDURE EVALUATION
Anesthesia POST Procedure Evaluation    Patient: Zuleyma Reed   MRN:     [id number] Gender:   female   Age:    49 year old :      1970        Preoperative Diagnosis: * No pre-op diagnosis entered *   * No procedures listed *   Postop Comments: No value filed.       Anesthesia Type:  Not documented  No value filed.    Reportable Event: NO     PAIN: Uncomplicated   Sign Out status: Comfortable, Well controlled pain     PONV: No PONV   Sign Out status:  No Nausea or Vomiting     Neuro/Psych: Uneventful perioperative course   Sign Out Status: Preoperative baseline; Age appropriate mentation     Airway/Resp.: Uneventful perioperative course   Sign Out Status: Non labored breathing, age appropriate RR; Resp. Status within EXPECTED Parameters     CV: Uneventful perioperative course   Sign Out status: Appropriate BP and perfusion indices; Appropriate HR/Rhythm     Disposition:   Sign Out in:  PACU  Disposition:  Phase II; Home  Recovery Course: Uneventful  Follow-Up: Not required           Last Anesthesia Record Vitals:  CRNA VITALS  10/25/2019 1138 - 10/25/2019 1214      10/25/2019             Pulse:  125    SpO2:  97 %    Resp Rate (set):  8          Last PACU Vitals:  Vitals Value Taken Time   BP     Temp     Pulse     Resp     SpO2     Temp src     NIBP 164/96 10/25/2019 12:08 PM   Pulse 125 10/25/2019 12:09 PM   SpO2 97 % 10/25/2019 12:09 PM   Resp     Temp     Ht Rate 136 10/25/2019 12:08 PM   Temp 2           Electronically Signed By: Tana Harrell MD, 2019, 12:14 PM

## 2019-10-25 NOTE — PLAN OF CARE
Problem: OT General Care Plan  Goal: OT Frequency  Description  Pt will practice using >2 coping strategies to manage stress and reduce symptoms to demonstrate increased readiness for discharge.     Attended 1/3 occupational therapy groups offered this date. Overall content and cooperative.    Wellness and coping skill based game: Movement [employer]. Education was provided on the sensory system, mind-body connection, and the use of movement and sensory strategies for self regulation. Pt Response: I to participate, even encouraging others to participate. Demonstrated safe body mechanics and verbalized enjoying stretches.     Outcome: Improving

## 2019-10-25 NOTE — ANESTHESIA PREPROCEDURE EVALUATION
Anesthesia Pre-Procedure Evaluation    Patient: Zuleyma Reed   MRN:     [id number] Gender:   female   Age:    49 year old :      1970        Preoperative Diagnosis: * No pre-op diagnosis entered *   * No procedures listed *     Past Medical History:   Diagnosis Date     Depression with anxiety       Past Surgical History:   Procedure Laterality Date     ORTHOPEDIC SURGERY            Anesthesia Evaluation     .             ROS/MED HX    ENT/Pulmonary:       Neurologic:       Cardiovascular:         METS/Exercise Tolerance:     Hematologic:         Musculoskeletal:         GI/Hepatic:         Renal/Genitourinary:         Endo:         Psychiatric:     (+) psychiatric history       Infectious Disease:         Malignancy:         Other:                         PHYSICAL EXAM:   Mental Status/Neuro: A/A/O   Airway: Facies: Feasible  Mallampati: I  Mouth/Opening: Full  TM distance: > 6 cm  Neck ROM: Full   Respiratory: Auscultation: CTAB     Resp. Rate: Normal     Resp. Effort: Normal      CV: Rhythm: Regular  Rate: Age appropriate  Heart: Normal Sounds  Edema: None   Comments:      Dental: Normal Dentition                LABS:  CBC:   Lab Results   Component Value Date    WBC 9.3 10/23/2019    WBC 8.2 2019    HGB 15.0 10/23/2019    HGB 15.7 2019    HCT 45.1 10/23/2019    HCT 46.4 2019     10/23/2019     2019     BMP:   Lab Results   Component Value Date     10/23/2019     2019    POTASSIUM 4.1 10/23/2019    POTASSIUM 4.1 2019    CHLORIDE 107 10/23/2019    CHLORIDE 109 2019    CO2 27 10/23/2019    CO2 28 2019    BUN 11 10/23/2019    BUN 10 2019    CR 0.85 10/23/2019    CR 0.92 2019    GLC 96 10/23/2019     (H) 2019     COAGS: No results found for: PTT, INR, FIBR  POC:   Lab Results   Component Value Date    HCG Negative 10/24/2019     OTHER:   Lab Results   Component Value Date    SHANA 8.5 10/23/2019    MAG 2.2  "04/06/2006    ALBUMIN 3.6 10/23/2019    PROTTOTAL 7.1 10/23/2019    ALT 20 10/23/2019    AST 8 10/23/2019    ALKPHOS 61 10/23/2019    BILITOTAL 0.9 10/23/2019    LIPASE 65 04/06/2006    TSH 3.34 10/23/2019        Preop Vitals    BP Readings from Last 3 Encounters:   10/24/19 (!) 142/81   10/04/19 (!) 142/90   10/01/19 130/82    Pulse Readings from Last 3 Encounters:   10/24/19 80   10/04/19 84   10/01/19 88      Resp Readings from Last 3 Encounters:   10/25/19 18   04/11/19 16   02/02/19 14    SpO2 Readings from Last 3 Encounters:   10/25/19 98%   10/04/19 99%   10/01/19 98%      Temp Readings from Last 1 Encounters:   10/25/19 37.2  C (99  F) (Oral)    Ht Readings from Last 1 Encounters:   10/22/19 1.74 m (5' 8.5\")      Wt Readings from Last 1 Encounters:   10/24/19 89.9 kg (198 lb 4.8 oz)    Estimated body mass index is 29.71 kg/m  as calculated from the following:    Height as of this encounter: 1.74 m (5' 8.5\").    Weight as of this encounter: 89.9 kg (198 lb 4.8 oz).     LDA:  Peripheral IV 10/25/19 Right Hand (Active)   Number of days: 0        Assessment:   ASA SCORE: 3      Smoking Status:  Non-Smoker/Unknown   NPO Status: NPO Appropriate     Plan:   Anes. Type:  General   Pre-Medication: None   Induction:  IV (Standard)   Airway: Mask   Access/Monitoring: PIV   Maintenance: Balanced     Postop Plan:   Postop Pain: Opioids  Postop Sedation/Airway: Not planned     PONV Management:   Adult Risk Factors: Female, Non-Smoker, Postop Opioids                   Tana Harrell MD  "
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
## 2019-10-25 NOTE — PROGRESS NOTES
10/24/19 2200   Significant Event   Significant Event Other (see comments)  (shift summary)     Pt was visible in the milieu, watched TV, interacted with peers, participated in groups, ate dinner and independent with ADLS.  SI, thoughts only, No SIB or hallucinations.  Depressed and anxious..

## 2019-10-26 PROCEDURE — 12400001 ZZH R&B MH UMMC

## 2019-10-26 PROCEDURE — 25000132 ZZH RX MED GY IP 250 OP 250 PS 637: Performed by: STUDENT IN AN ORGANIZED HEALTH CARE EDUCATION/TRAINING PROGRAM

## 2019-10-26 PROCEDURE — 25000131 ZZH RX MED GY IP 250 OP 636 PS 637: Performed by: STUDENT IN AN ORGANIZED HEALTH CARE EDUCATION/TRAINING PROGRAM

## 2019-10-26 RX ADMIN — PREDNISONE 10 MG: 5 TABLET ORAL at 10:52

## 2019-10-26 RX ADMIN — AMOXICILLIN AND CLAVULANATE POTASSIUM 1 TABLET: 875; 125 TABLET, FILM COATED ORAL at 08:04

## 2019-10-26 RX ADMIN — IBUPROFEN 600 MG: 600 TABLET ORAL at 17:54

## 2019-10-26 RX ADMIN — AMOXICILLIN AND CLAVULANATE POTASSIUM 1 TABLET: 875; 125 TABLET, FILM COATED ORAL at 21:22

## 2019-10-26 RX ADMIN — VILAZODONE HYDROCHLORIDE 10 MG: 10 TABLET ORAL at 08:04

## 2019-10-26 RX ADMIN — ACETAMINOPHEN 650 MG: 325 TABLET, FILM COATED ORAL at 10:53

## 2019-10-26 RX ADMIN — CYCLOBENZAPRINE HYDROCHLORIDE 10 MG: 5 TABLET, FILM COATED ORAL at 21:26

## 2019-10-26 RX ADMIN — LIDOCAINE 1 PATCH: 560 PATCH PERCUTANEOUS; TOPICAL; TRANSDERMAL at 21:22

## 2019-10-26 ASSESSMENT — ACTIVITIES OF DAILY LIVING (ADL)
DRESS: STREET CLOTHES
LAUNDRY: WITH SUPERVISION
LAUNDRY: WITH SUPERVISION
DRESS: INDEPENDENT
HYGIENE/GROOMING: INDEPENDENT
ORAL_HYGIENE: INDEPENDENT
HYGIENE/GROOMING: INDEPENDENT
ORAL_HYGIENE: INDEPENDENT

## 2019-10-26 NOTE — PROGRESS NOTES
Pt s mood was calm and had full range of affect. She told this writer that she had an headache after ECT. Tylenol was given for her headache. She out in the unge watched TV with others. She was social with others. She stated that she had no suicidal ideation ot self-harm thoughts. She had no auditory or visual hallucination. Pt took a shower this evening. Pt participated in recreational therapy group.

## 2019-10-26 NOTE — PROGRESS NOTES
10/25/19 2100   Therapeutic Recreation   Type of Intervention structured groups   Activity game   Response Participates, initiates socially appropriate   Hours 1     Pt participated in Therapeutic Recreation group with focus on leisure participation, social engagement, and critical thinking. Engaged and focused in the group recreational intervention via a group game.  Pt was a full participant throughout the entire duration of group. Showed progress in session goals. Pt mood was calm. Appropriately shared sense of humor with peers during groups, and appeared to brighten with social interaction.

## 2019-10-26 NOTE — PROGRESS NOTES
Patient had a calm mood, alert and feeling good today. Reports feeling scary/nervous about ECT process but now she is feeling hopeful. Reported generalized pain but more so in her neck. Patient thinks ECT triggers her pain in the neck. Reports poor sleep. States she falls at sleep but struggle staying at sleep. Patient's family visited this shift. Patient became sad and tearful after family left. Denied SI/SIB.     10/26/19 3144   Behavioral Health   Hallucinations denies / not responding to hallucinations   Thinking distractable   Orientation person: oriented;place: oriented;date: oriented;time: oriented   Memory baseline memory   Insight insight appropriate to situation   Judgement impaired   Eye Contact at examiner   Affect sad;full range affect   Mood mood is calm   Physical Appearance/Attire appears stated age   Hygiene well groomed   Suicidality other (see comments)  (denied )   1. Wish to be Dead (Past Month) No   2. Non-Specific Active Suicidal Thoughts (Past Month) No   Self Injury   (denied )   Elopement   (none observed/mentioned )   Activity other (see comment)  (visible in milieu, watching tv and social with others )   Speech clear;coherent   Medication Sensitivity no stated side effects   Psychomotor / Gait balanced;steady   Psycho Education   Type of Intervention 1:1 intervention   Response participates, initiates socially appropriate   Hours 0.5   Treatment Detail   (check-in )   Safety   Suicidality Status 15   Activities of Daily Living   Hygiene/Grooming independent   Oral Hygiene independent   Dress independent   Laundry with supervision   Room Organization independent   Groups   Details   (attended and participated in community meeting )

## 2019-10-27 ENCOUNTER — APPOINTMENT (OUTPATIENT)
Dept: GENERAL RADIOLOGY | Facility: CLINIC | Age: 49
DRG: 885 | End: 2019-10-27
Attending: PSYCHIATRY & NEUROLOGY
Payer: COMMERCIAL

## 2019-10-27 PROCEDURE — 25000131 ZZH RX MED GY IP 250 OP 636 PS 637: Performed by: STUDENT IN AN ORGANIZED HEALTH CARE EDUCATION/TRAINING PROGRAM

## 2019-10-27 PROCEDURE — 25000132 ZZH RX MED GY IP 250 OP 250 PS 637: Performed by: STUDENT IN AN ORGANIZED HEALTH CARE EDUCATION/TRAINING PROGRAM

## 2019-10-27 PROCEDURE — 12400001 ZZH R&B MH UMMC

## 2019-10-27 PROCEDURE — 70160 X-RAY EXAM OF NASAL BONES: CPT

## 2019-10-27 PROCEDURE — H2032 ACTIVITY THERAPY, PER 15 MIN: HCPCS

## 2019-10-27 RX ADMIN — ACETAMINOPHEN 650 MG: 325 TABLET, FILM COATED ORAL at 19:04

## 2019-10-27 RX ADMIN — AMOXICILLIN AND CLAVULANATE POTASSIUM 1 TABLET: 875; 125 TABLET, FILM COATED ORAL at 08:55

## 2019-10-27 RX ADMIN — IBUPROFEN 600 MG: 600 TABLET ORAL at 22:28

## 2019-10-27 RX ADMIN — VILAZODONE HYDROCHLORIDE 10 MG: 10 TABLET ORAL at 08:55

## 2019-10-27 RX ADMIN — PREDNISONE 10 MG: 5 TABLET ORAL at 08:56

## 2019-10-27 RX ADMIN — CYCLOBENZAPRINE HYDROCHLORIDE 10 MG: 5 TABLET, FILM COATED ORAL at 21:00

## 2019-10-27 RX ADMIN — AMOXICILLIN AND CLAVULANATE POTASSIUM 1 TABLET: 875; 125 TABLET, FILM COATED ORAL at 21:00

## 2019-10-27 ASSESSMENT — MIFFLIN-ST. JEOR: SCORE: 1590.02

## 2019-10-27 ASSESSMENT — ACTIVITIES OF DAILY LIVING (ADL)
DRESS: INDEPENDENT
ORAL_HYGIENE: INDEPENDENT
HYGIENE/GROOMING: INDEPENDENT
LAUNDRY: WITH SUPERVISION
LAUNDRY: WITH SUPERVISION
HYGIENE/GROOMING: SHOWER;INDEPENDENT
ORAL_HYGIENE: INDEPENDENT
DRESS: INDEPENDENT

## 2019-10-27 NOTE — PROGRESS NOTES
Patient reports overall improved mood with some remaining depression and anxiety, denies current SI/SIB. Patient states she has significant neck pain and general body aches from ECT. Patient was visible and social with full range affect during the shift, and participated in groups.     10/26/19 2200   Behavioral Health   Hallucinations denies / not responding to hallucinations   Thinking intact   Orientation person: oriented;place: oriented;date: oriented;time: oriented   Memory baseline memory   Insight admits / accepts   Judgement impaired   Eye Contact at examiner   Affect sad;full range affect   Mood depressed;mood is calm   Physical Appearance/Attire appears stated age;attire appropriate to age and situation   Hygiene well groomed   Suicidality other (see comments)  (Denies)   1. Wish to be Dead (Past Month) No   2. Non-Specific Active Suicidal Thoughts (Past Month) No   Self Injury other (see comment)  (Denies)   Activity other (see comment)  (Visible and social.)   Speech clear;coherent   Medication Sensitivity no stated side effects;no observed side effects   Psychomotor / Gait balanced;steady   Activities of Daily Living   Hygiene/Grooming independent   Oral Hygiene independent   Dress street clothes   Laundry with supervision   Room Organization independent

## 2019-10-28 ENCOUNTER — ANESTHESIA (OUTPATIENT)
Dept: BEHAVIORAL HEALTH | Facility: CLINIC | Age: 49
End: 2019-10-28

## 2019-10-28 ENCOUNTER — ANESTHESIA EVENT (OUTPATIENT)
Dept: BEHAVIORAL HEALTH | Facility: CLINIC | Age: 49
End: 2019-10-28

## 2019-10-28 PROCEDURE — 25000132 ZZH RX MED GY IP 250 OP 250 PS 637: Performed by: STUDENT IN AN ORGANIZED HEALTH CARE EDUCATION/TRAINING PROGRAM

## 2019-10-28 PROCEDURE — H2032 ACTIVITY THERAPY, PER 15 MIN: HCPCS

## 2019-10-28 PROCEDURE — 25000125 ZZHC RX 250: Performed by: ANESTHESIOLOGY

## 2019-10-28 PROCEDURE — 99232 SBSQ HOSP IP/OBS MODERATE 35: CPT | Mod: 25 | Performed by: PSYCHIATRY & NEUROLOGY

## 2019-10-28 PROCEDURE — 25000128 H RX IP 250 OP 636: Performed by: ANESTHESIOLOGY

## 2019-10-28 PROCEDURE — 25000132 ZZH RX MED GY IP 250 OP 250 PS 637: Performed by: PSYCHIATRY & NEUROLOGY

## 2019-10-28 PROCEDURE — 12400001 ZZH R&B MH UMMC

## 2019-10-28 PROCEDURE — 90870 ELECTROCONVULSIVE THERAPY: CPT

## 2019-10-28 RX ORDER — KETOROLAC TROMETHAMINE 30 MG/ML
30 INJECTION, SOLUTION INTRAMUSCULAR; INTRAVENOUS ONCE
Status: DISCONTINUED | OUTPATIENT
Start: 2019-10-28 | End: 2019-10-28

## 2019-10-28 RX ORDER — ACETAMINOPHEN 325 MG/1
975 TABLET ORAL
Status: DISCONTINUED | OUTPATIENT
Start: 2019-10-28 | End: 2019-10-31 | Stop reason: HOSPADM

## 2019-10-28 RX ORDER — VILAZODONE HYDROCHLORIDE 10 MG/1
10 TABLET ORAL AT BEDTIME
Status: DISCONTINUED | OUTPATIENT
Start: 2019-10-28 | End: 2019-10-31 | Stop reason: HOSPADM

## 2019-10-28 RX ORDER — IBUPROFEN 600 MG/1
600 TABLET, FILM COATED ORAL EVERY 6 HOURS PRN
Status: DISCONTINUED | OUTPATIENT
Start: 2019-10-29 | End: 2019-10-31 | Stop reason: HOSPADM

## 2019-10-28 RX ADMIN — AMOXICILLIN AND CLAVULANATE POTASSIUM 1 TABLET: 875; 125 TABLET, FILM COATED ORAL at 20:02

## 2019-10-28 RX ADMIN — ACETAMINOPHEN 975 MG: 325 TABLET, FILM COATED ORAL at 09:30

## 2019-10-28 RX ADMIN — METHOHEXITAL SODIUM 80 MG: 500 INJECTION, POWDER, LYOPHILIZED, FOR SOLUTION INTRAMUSCULAR; INTRAVENOUS; RECTAL at 11:19

## 2019-10-28 RX ADMIN — Medication 80 MG: at 11:22

## 2019-10-28 RX ADMIN — AMOXICILLIN AND CLAVULANATE POTASSIUM 1 TABLET: 875; 125 TABLET, FILM COATED ORAL at 12:11

## 2019-10-28 RX ADMIN — TRAZODONE HYDROCHLORIDE 50 MG: 50 TABLET ORAL at 22:38

## 2019-10-28 RX ADMIN — Medication 60 MG: at 10:51

## 2019-10-28 RX ADMIN — MIDAZOLAM 2 MG: 1 INJECTION INTRAMUSCULAR; INTRAVENOUS at 11:32

## 2019-10-28 RX ADMIN — IBUPROFEN 600 MG: 600 TABLET ORAL at 13:03

## 2019-10-28 RX ADMIN — IBUPROFEN 600 MG: 600 TABLET, FILM COATED ORAL at 22:38

## 2019-10-28 RX ADMIN — VILAZODONE HYDROCHLORIDE 10 MG: 10 TABLET ORAL at 22:38

## 2019-10-28 RX ADMIN — METHOHEXITAL SODIUM 50 MG: 500 INJECTION, POWDER, LYOPHILIZED, FOR SOLUTION INTRAMUSCULAR; INTRAVENOUS; RECTAL at 11:23

## 2019-10-28 ASSESSMENT — ACTIVITIES OF DAILY LIVING (ADL)
ORAL_HYGIENE: INDEPENDENT
DRESS: INDEPENDENT
LAUNDRY: WITH SUPERVISION
HYGIENE/GROOMING: INDEPENDENT

## 2019-10-28 NOTE — ANESTHESIA POSTPROCEDURE EVALUATION
Anesthesia POST Procedure Evaluation    Patient: Zuleyma Reed   MRN:     [id number] Gender:   female   Age:    49 year old :      1970        Preoperative Diagnosis: * No pre-op diagnosis entered *   * No procedures listed *   Postop Comments: No value filed.       Anesthesia Type:  Not documented  No value filed.    Reportable Event: NO     PAIN: Uncomplicated   Sign Out status: Comfortable, Well controlled pain     PONV: No PONV   Sign Out status:  No Nausea or Vomiting     Neuro/Psych: Uneventful perioperative course   Sign Out Status: Preoperative baseline; Age appropriate mentation     Airway/Resp.: Uneventful perioperative course   Sign Out Status: Non labored breathing, age appropriate RR; Resp. Status within EXPECTED Parameters     CV: Uneventful perioperative course   Sign Out status: Appropriate BP and perfusion indices; Appropriate HR/Rhythm     Disposition:   Sign Out in:  PACU  Disposition:  Phase II; Home  Recovery Course: Uneventful  Follow-Up: Not required           Last Anesthesia Record Vitals:  CRNA VITALS  10/28/2019 1100 - 10/28/2019 1134      10/28/2019             Pulse:  99    Ht Rate:  110    SpO2:  100 %    Resp Rate (set):  8          Last PACU Vitals:  Vitals Value Taken Time   /86 10/28/2019 11:30 AM   Temp 36.6  C (97.8  F) 10/28/2019 11:30 AM   Pulse 83 10/28/2019 11:30 AM   Resp 18 10/28/2019 11:30 AM   SpO2 99 % 10/28/2019 11:30 AM   Temp src     NIBP 172/90 10/28/2019 11:26 AM   Pulse 99 10/28/2019 11:30 AM   SpO2 100 % 10/28/2019 11:30 AM   Resp     Temp     Ht Rate 110 10/28/2019 11:30 AM   Temp 2           Electronically Signed By: Raymond Webster DO, 2019, 11:34 AM

## 2019-10-28 NOTE — PROGRESS NOTES
"   10/27/19 2000   Behavioral Health   Hallucinations denies / not responding to hallucinations   Thinking intact   Orientation person: oriented;place: oriented   Memory baseline memory   Insight insight appropriate to situation   Judgement impaired   Eye Contact at examiner   Affect blunted, flat   Mood mood is calm   Physical Appearance/Attire neat   Hygiene well groomed   Suicidality other (see comments)  (denies)   1. Wish to be Dead (Past Month) No   2. Non-Specific Active Suicidal Thoughts (Past Month) No   Self Injury other (see comment)  (denies)   Activity other (see comment)  (was visible in the milieu )   Speech clear;coherent   Activities of Daily Living   Hygiene/Grooming shower;independent   Oral Hygiene independent   Dress independent   Laundry with supervision   Room Organization independent       Pt denied SI and SIB.  Pt reported \" not having a good day I'm on my period.\"  Pt ate supper, showered, blunted affect, but was visible in the milieu.  Pt is independent with ADL's.    "

## 2019-10-28 NOTE — PROGRESS NOTES
At approximately 10 pm, pt was playing card in the dining room. There was a small spider crawled on the table. Pt was scared and jumped up. She fell on her L knee then on her face. Her nose hit the floor. Obvious swelling and bruised. On call resident Dr. Villarreal was paged and was here to assess pt. Pt was taken to X-ray via w/c. Ice packed was applied and Ibuprofen was given to pt for comfort. Nasal passage was clear and moving air without problem. Pt denied having LOC. Staff will continue to monitor pt per care plan. Pt is scheduled for ECT in the morning.

## 2019-10-28 NOTE — PROGRESS NOTES
"Participated in Music Therapy group with focus on mood elevation, validation and decreasing anxiety and improved group cohesiveness. Engaged and cooperative in music listening interventions.   Showed progress in session goals.     Sang with gusto along with the song \"Carry On\" by the band \"Fun.  Calm and appropriate; focused.   "

## 2019-10-28 NOTE — PROGRESS NOTES
"Patient was alert to self, surroundings, date and time upon arrival back from [facility] at 1200. She had delayed responses to questions, denied any pain. Vitals were taken and were within normal limits /80 (BP Location: Left arm)   Pulse 79   Temp 98.5  F (36.9  C) (Oral)   Resp 16   Ht 1.74 m (5' 8.5\")   Wt 89.9 kg (198 lb 1.6 oz)   LMP  (LMP Unknown)   SpO2 98%   BMI 29.68 kg/m    Patient was wheel chaired to her room and assisted to bed. Upon returning to the room patient was up ambulating to the bathroom. Patient seemed steady on her feet. Will continue to monitor patient.  "

## 2019-10-28 NOTE — PROGRESS NOTES
"The pt has been dealing with physical discomfort this shift. Her nose is still quite swollen, and she is waiting on the results of her xray. She is also feeling side effects of her ECT; headache at site, feeling of instability/wooziness. She reports still having a good appetite, and is able to keep it down. She denies any SI/SIB urges and hallucinations. She has stated that she is \"perfectly able to come to staff if her safety becomes a concern.\" SHe has also stated that she is trying to stay out of her room and to not isolate, but the \"feeling of the unit has been pretty icky the past 24 hours.\" She was unable to give specifics for this, but this writer is aware of certain situations in reference.     10/28/19 4425   Behavioral Health   Hallucinations denies / not responding to hallucinations   Thinking intact   Orientation person: oriented;place: oriented;date: oriented;time: oriented   Memory baseline memory   Insight admits / accepts   Judgement impaired   Eye Contact at examiner   Affect blunted, flat;other (see comments)  (moments of brightness)   Mood mood is calm;depressed   Physical Appearance/Attire appears stated age   Hygiene other (see comment)  (adequate)   Suicidality other (see comments)  (pt denies)   1. Wish to be Dead (Past Month) No   2. Non-Specific Active Suicidal Thoughts (Past Month) No   Self Injury other (see comment)  (pt denies)   Elopement   (nothing to report)   Activity other (see comment)  (visible at times, ECT)   Speech coherent   Medication Sensitivity no stated side effects   Psychomotor / Gait balanced     "

## 2019-10-28 NOTE — ANESTHESIA PREPROCEDURE EVALUATION
Anesthesia Pre-Procedure Evaluation    Patient: Zuleyma Reed   MRN:     [id number] Gender:   female   Age:    49 year old :      1970        Preoperative Diagnosis: * No pre-op diagnosis entered *   * No procedures listed *     Past Medical History:   Diagnosis Date     Depression with anxiety       Past Surgical History:   Procedure Laterality Date     ORTHOPEDIC SURGERY                     PHYSICAL EXAM:   Mental Status/Neuro: A/A/O   Airway: Facies: Feasible  Mallampati: II  Mouth/Opening: Full  TM distance: > 6 cm  Neck ROM: Full   Respiratory: Auscultation: CTAB     Resp. Rate: Normal     Resp. Effort: Normal      CV: Rhythm: Regular  Rate: Age appropriate  Heart: Normal Sounds  Edema: None   Comments:      Dental: Normal Dentition                LABS:  CBC:   Lab Results   Component Value Date    WBC 9.3 10/23/2019    WBC 8.2 2019    HGB 15.0 10/23/2019    HGB 15.7 2019    HCT 45.1 10/23/2019    HCT 46.4 2019     10/23/2019     2019     BMP:   Lab Results   Component Value Date     10/23/2019     2019    POTASSIUM 4.1 10/23/2019    POTASSIUM 4.1 2019    CHLORIDE 107 10/23/2019    CHLORIDE 109 2019    CO2 27 10/23/2019    CO2 28 2019    BUN 11 10/23/2019    BUN 10 2019    CR 0.85 10/23/2019    CR 0.92 2019    GLC 96 10/23/2019     (H) 2019     COAGS: No results found for: PTT, INR, FIBR  POC:   Lab Results   Component Value Date    HCG Negative 10/24/2019     OTHER:   Lab Results   Component Value Date    SHANA 8.5 10/23/2019    MAG 2.2 2006    ALBUMIN 3.6 10/23/2019    PROTTOTAL 7.1 10/23/2019    ALT 20 10/23/2019    AST 8 10/23/2019    ALKPHOS 61 10/23/2019    BILITOTAL 0.9 10/23/2019    LIPASE 65 2006    TSH 3.34 10/23/2019        Preop Vitals    BP Readings from Last 3 Encounters:   10/28/19 109/71   10/04/19 (!) 142/90   10/01/19 130/82    Pulse Readings from Last 3 Encounters:   10/28/19  "85   10/04/19 84   10/01/19 88      Resp Readings from Last 3 Encounters:   10/28/19 16   04/11/19 16   02/02/19 14    SpO2 Readings from Last 3 Encounters:   10/28/19 96%   10/04/19 99%   10/01/19 98%      Temp Readings from Last 1 Encounters:   10/28/19 37  C (98.6  F) (Temporal)    Ht Readings from Last 1 Encounters:   10/22/19 1.74 m (5' 8.5\")      Wt Readings from Last 1 Encounters:   10/27/19 89.9 kg (198 lb 1.6 oz)    Estimated body mass index is 29.68 kg/m  as calculated from the following:    Height as of this encounter: 1.74 m (5' 8.5\").    Weight as of this encounter: 89.9 kg (198 lb 1.6 oz).     LDA:        Assessment:   ASA SCORE: 2            Plan:   Anes. Type:  General   Pre-Medication: None   Induction:  IV (Standard)   Airway: Mask   Access/Monitoring: PIV   Maintenance: N/a     Postop Plan:   Postop Pain: Opioids  Postop Sedation/Airway: Not planned     PONV Management:   Adult Risk Factors: Female, Postop Opioids                   Raymond Webster,   "
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
## 2019-10-28 NOTE — PROGRESS NOTES
On-Call Resident Cross Cover Note    S: Paged by nursing staff that patient fell and hit nose. Pt. was playing cards when a spider crawled across the table, and patient got scared. She jumped up and fell, falling on her L knee first and then her face hit the ground with the bridge of her nose contacting first. No LOC and reports no HA afterward. Does not believe any other part of her head had contact with the ground. Patient blew nose afterward, which had some blood. She reports it is swollen and throbbing. Would like some ibuprofen to help with pain and swelling. No other concerns. Reports she has never broken her nose before. Feels pain is not severe enough that she thinks she broke her nose.     O: mild bruising and swelling on exterior bridge of nose. Nares patent with no blood present bilaterally. Small 3-4 cm bruise inferior to patella just medial to midline of tibia. Able to ambulate without issue.    A/P: Rule-out nasal fracture  Ordered X-Ray of nasal bones (3-view)  IBU PRN for pain and swelling.  Recommended pt. Continue to apply ice to nose.    Aric iVllarreal MD  Psychiatry Resident PGY-2

## 2019-10-28 NOTE — PROGRESS NOTES
Patient adequate for discharge. Report called to inpatient JONATHAN William on station [id number]. VSS, A/O, IV removed. Discharged with staff at this time.
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
## 2019-10-29 PROCEDURE — 99232 SBSQ HOSP IP/OBS MODERATE 35: CPT | Mod: GC | Performed by: PSYCHIATRY & NEUROLOGY

## 2019-10-29 PROCEDURE — 25000132 ZZH RX MED GY IP 250 OP 250 PS 637: Performed by: STUDENT IN AN ORGANIZED HEALTH CARE EDUCATION/TRAINING PROGRAM

## 2019-10-29 PROCEDURE — G0177 OPPS/PHP; TRAIN & EDUC SERV: HCPCS

## 2019-10-29 PROCEDURE — H2032 ACTIVITY THERAPY, PER 15 MIN: HCPCS

## 2019-10-29 PROCEDURE — 12400001 ZZH R&B MH UMMC

## 2019-10-29 RX ADMIN — CYCLOBENZAPRINE HYDROCHLORIDE 5 MG: 5 TABLET, FILM COATED ORAL at 21:30

## 2019-10-29 RX ADMIN — LIDOCAINE 1 PATCH: 560 PATCH PERCUTANEOUS; TOPICAL; TRANSDERMAL at 22:27

## 2019-10-29 RX ADMIN — OXYMETAZOLINE HYDROCHLORIDE 2 SPRAY: 0.05 SPRAY NASAL at 22:28

## 2019-10-29 RX ADMIN — VILAZODONE HYDROCHLORIDE 10 MG: 10 TABLET ORAL at 21:30

## 2019-10-29 RX ADMIN — IBUPROFEN 600 MG: 600 TABLET, FILM COATED ORAL at 21:30

## 2019-10-29 RX ADMIN — ACETAMINOPHEN 650 MG: 325 TABLET, FILM COATED ORAL at 16:10

## 2019-10-29 RX ADMIN — ACETAMINOPHEN 650 MG: 325 TABLET, FILM COATED ORAL at 09:30

## 2019-10-29 RX ADMIN — AMOXICILLIN AND CLAVULANATE POTASSIUM 1 TABLET: 875; 125 TABLET, FILM COATED ORAL at 09:24

## 2019-10-29 ASSESSMENT — ACTIVITIES OF DAILY LIVING (ADL)
DRESS: INDEPENDENT
ORAL_HYGIENE: INDEPENDENT
ORAL_HYGIENE: INDEPENDENT
HYGIENE/GROOMING: INDEPENDENT
LAUNDRY: WITH SUPERVISION
HYGIENE/GROOMING: INDEPENDENT
DRESS: INDEPENDENT

## 2019-10-29 ASSESSMENT — MIFFLIN-ST. JEOR: SCORE: 1602.26

## 2019-10-29 NOTE — PROGRESS NOTES
10/29/19 [facility]    Art Therapy   Type of Intervention structured groups   Response Participated with encouragement   Hours 1   Treatment Detail    Art Therapy- DBt grounding techniques/ affirmation   Goal- cope, regulate and express through Art Therapy directive     Outcome- pt attended the Art Therapy group.  She made very thoughtful project and was open to learning more about DBT and emotional regulation and distress tolerance. She was quietly engaged and supportive to peers , encouraging them.

## 2019-10-29 NOTE — PROGRESS NOTES
Note  The patient participated in an hour long case management led group. The focus of the group was on Values and Values Bingo to aid in mental health recovery. The patient was late to the group, but had a full range affect throughout the group. The patient was appropriate with both writer and peers in the group.
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
## 2019-10-29 NOTE — PROGRESS NOTES
"Psychiatry Cross Cover Note    Subjective: Notified by staff that patient reports adequate pain relief from acetaminophen. Not interested in taking Toradol IM at this time.     Objective: /80 (BP Location: Left arm)   Pulse 79   Temp 98.5  F (36.9  C) (Oral)   Resp 16   Ht 1.74 m (5' 8.5\")   Wt 89.9 kg (198 lb 1.6 oz)   LMP  (LMP Unknown)   SpO2 98%   BMI 29.68 kg/m      Assessment/Plan:  Will discontinue Toradol IM as patient no longer complaining of pain following acetaminophen. If patient does experience significant pain, related to her fall, can consider re-ordering and administering Toradol 30mg IM.     Giovani Plunkett MD  PGY-2 Psychiatry Resident      "

## 2019-10-29 NOTE — PROGRESS NOTES
"Pt is visible and social in the milieu. Pt denies SI/SIB and depression. Pt endorsed anxiety and rated it a 3. Pt shared that \" I'm a little anxious about how things are going to be when I get home. I have no friends outside of my parents and daughter. I don't really have much of a support system outside of here. Like here I have friends and staff who checks in on me and ask how I am doing. I go to groups and that keeps me accountable. I worry that I am going to fall in the same pattern of things like from the couch to my bed and not wanting to do anything. My apartment is dirty but I am not going to be able to find the strength to even clean that up\". Pt expressed having issues staying focused on a task.        10/29/19 1216   Behavioral Health   Hallucinations denies / not responding to hallucinations   Thinking intact   Orientation person: oriented;place: oriented;date: oriented;time: oriented   Memory baseline memory   Insight insight appropriate to situation   Judgement impaired   Eye Contact at examiner   Affect full range affect   Mood mood is calm   Physical Appearance/Attire appears stated age;attire appropriate to age and situation;neat   Hygiene well groomed   1. Wish to be Dead (Recent)   (denies)   2. Non-Specific Active Suicidal Thoughts (Recent) No   Self Injury   (denies)   Activity   (goes to group, social with peers)   Speech clear;coherent   Psychomotor / Gait balanced;steady   Activities of Daily Living   Hygiene/Grooming independent   Oral Hygiene independent   Dress independent     "

## 2019-10-30 ENCOUNTER — ANESTHESIA EVENT (OUTPATIENT)
Dept: BEHAVIORAL HEALTH | Facility: CLINIC | Age: 49
End: 2019-10-30

## 2019-10-30 ENCOUNTER — ANESTHESIA (OUTPATIENT)
Dept: BEHAVIORAL HEALTH | Facility: CLINIC | Age: 49
End: 2019-10-30

## 2019-10-30 ENCOUNTER — TRANSFERRED RECORDS (OUTPATIENT)
Dept: HEALTH INFORMATION MANAGEMENT | Facility: CLINIC | Age: 49
End: 2019-10-30

## 2019-10-30 PROBLEM — F17.200 TOBACCO DEPENDENCE SYNDROME: Status: ACTIVE | Noted: 2019-10-30

## 2019-10-30 PROCEDURE — 25000132 ZZH RX MED GY IP 250 OP 250 PS 637: Performed by: STUDENT IN AN ORGANIZED HEALTH CARE EDUCATION/TRAINING PROGRAM

## 2019-10-30 PROCEDURE — 90686 IIV4 VACC NO PRSV 0.5 ML IM: CPT | Performed by: PSYCHIATRY & NEUROLOGY

## 2019-10-30 PROCEDURE — 25000125 ZZHC RX 250: Performed by: ANESTHESIOLOGY

## 2019-10-30 PROCEDURE — GZB0ZZZ ELECTROCONVULSIVE THERAPY, UNILATERAL-SINGLE SEIZURE: ICD-10-PCS | Performed by: PSYCHIATRY & NEUROLOGY

## 2019-10-30 PROCEDURE — H2032 ACTIVITY THERAPY, PER 15 MIN: HCPCS

## 2019-10-30 PROCEDURE — 90870 ELECTROCONVULSIVE THERAPY: CPT

## 2019-10-30 PROCEDURE — 12400001 ZZH R&B MH UMMC

## 2019-10-30 PROCEDURE — 25000128 H RX IP 250 OP 636: Performed by: ANESTHESIOLOGY

## 2019-10-30 PROCEDURE — 25000128 H RX IP 250 OP 636: Performed by: PSYCHIATRY & NEUROLOGY

## 2019-10-30 RX ORDER — TRAZODONE HYDROCHLORIDE 50 MG/1
50 TABLET, FILM COATED ORAL
Qty: 30 TABLET | Refills: 0 | Status: SHIPPED | OUTPATIENT
Start: 2019-10-30 | End: 2019-10-31

## 2019-10-30 RX ORDER — ONDANSETRON 4 MG/1
4 TABLET, ORALLY DISINTEGRATING ORAL EVERY 30 MIN PRN
Status: DISCONTINUED | OUTPATIENT
Start: 2019-10-30 | End: 2019-10-30

## 2019-10-30 RX ORDER — POLYETHYLENE GLYCOL 3350 17 G
2-4 POWDER IN PACKET (EA) ORAL
Qty: 90 LOZENGE | Refills: 0 | Status: SHIPPED | OUTPATIENT
Start: 2019-10-30 | End: 2019-10-31

## 2019-10-30 RX ORDER — VILAZODONE HYDROCHLORIDE 10 MG/1
10 TABLET ORAL AT BEDTIME
Qty: 30 TABLET | Refills: 0 | Status: SHIPPED | OUTPATIENT
Start: 2019-10-30 | End: 2019-10-31

## 2019-10-30 RX ORDER — SODIUM CHLORIDE, SODIUM LACTATE, POTASSIUM CHLORIDE, CALCIUM CHLORIDE 600; 310; 30; 20 MG/100ML; MG/100ML; MG/100ML; MG/100ML
INJECTION, SOLUTION INTRAVENOUS CONTINUOUS
Status: DISCONTINUED | OUTPATIENT
Start: 2019-10-30 | End: 2019-10-30

## 2019-10-30 RX ORDER — CYCLOBENZAPRINE HCL 5 MG
5-10 TABLET ORAL 3 TIMES DAILY PRN
Qty: 90 TABLET | Refills: 0 | Status: SHIPPED | OUTPATIENT
Start: 2019-10-30 | End: 2019-10-31

## 2019-10-30 RX ORDER — NALOXONE HYDROCHLORIDE 0.4 MG/ML
.1-.4 INJECTION, SOLUTION INTRAMUSCULAR; INTRAVENOUS; SUBCUTANEOUS
Status: DISCONTINUED | OUTPATIENT
Start: 2019-10-30 | End: 2019-10-30

## 2019-10-30 RX ORDER — ONDANSETRON 2 MG/ML
4 INJECTION INTRAMUSCULAR; INTRAVENOUS EVERY 30 MIN PRN
Status: DISCONTINUED | OUTPATIENT
Start: 2019-10-30 | End: 2019-10-30

## 2019-10-30 RX ORDER — FENTANYL CITRATE 50 UG/ML
25-50 INJECTION, SOLUTION INTRAMUSCULAR; INTRAVENOUS
Status: DISCONTINUED | OUTPATIENT
Start: 2019-10-30 | End: 2019-10-30

## 2019-10-30 RX ADMIN — HYDROXYZINE HYDROCHLORIDE 25 MG: 25 TABLET, FILM COATED ORAL at 19:50

## 2019-10-30 RX ADMIN — ACETAMINOPHEN 650 MG: 325 TABLET, FILM COATED ORAL at 17:43

## 2019-10-30 RX ADMIN — Medication 90 MG: at 09:27

## 2019-10-30 RX ADMIN — METHOHEXITAL SODIUM 90 MG: 500 INJECTION, POWDER, LYOPHILIZED, FOR SOLUTION INTRAMUSCULAR; INTRAVENOUS; RECTAL at 09:25

## 2019-10-30 RX ADMIN — INFLUENZA A VIRUS A/BRISBANE/02/2018 IVR-190 (H1N1) ANTIGEN (FORMALDEHYDE INACTIVATED), INFLUENZA A VIRUS A/KANSAS/14/2017 X-327 (H3N2) ANTIGEN (FORMALDEHYDE INACTIVATED), INFLUENZA B VIRUS B/PHUKET/3073/2013 ANTIGEN (FORMALDEHYDE INACTIVATED), AND INFLUENZA B VIRUS B/MARYLAND/15/2016 BX-69A ANTIGEN (FORMALDEHYDE INACTIVATED) 0.5 ML: 15; 15; 15; 15 INJECTION, SUSPENSION INTRAMUSCULAR at 17:16

## 2019-10-30 RX ADMIN — IBUPROFEN 600 MG: 600 TABLET, FILM COATED ORAL at 19:50

## 2019-10-30 RX ADMIN — CYCLOBENZAPRINE HYDROCHLORIDE 10 MG: 5 TABLET, FILM COATED ORAL at 22:30

## 2019-10-30 RX ADMIN — ACETAMINOPHEN 650 MG: 325 TABLET, FILM COATED ORAL at 13:44

## 2019-10-30 RX ADMIN — VILAZODONE HYDROCHLORIDE 10 MG: 10 TABLET ORAL at 22:30

## 2019-10-30 ASSESSMENT — ACTIVITIES OF DAILY LIVING (ADL)
ORAL_HYGIENE: INDEPENDENT
LAUNDRY: WITH SUPERVISION
HYGIENE/GROOMING: INDEPENDENT
ORAL_HYGIENE: INDEPENDENT
DRESS: INDEPENDENT;STREET CLOTHES
DRESS: STREET CLOTHES
LAUNDRY: WITH SUPERVISION
HYGIENE/GROOMING: INDEPENDENT

## 2019-10-30 NOTE — PROGRESS NOTES
"Occupational Therapy Initial Assessment     Description: OT staff met with pt to review the role of occupational therapy and explain the value of having them involved in their treatment plan including options to meet current needs/self-identified goals. The below evaluation is a compilation of chart review and functional performance observation. Pt has attended several OT groups so far. Currently in ECT.     Pt reported previous daily routine to include FT employment, household maintenance, caring for pets, and visiting with family. However, identified often feeling isolated.       10/30/19 1126   Clinical Impression   Affect Appropriate to situation  (pleasant)   Orientation Oriented to person, place and time   Appearance and ADLs Neatly groomed   Attention to Internal Stimuli No observed signs   Interaction Skills Interacts appropriately with staff;Interacts appropriately with peers   Ability to Communicate Needs Independent   Verbal Content Articulate   Ability to Maintain Boundaries Maintains appropriate physical boundaries;Maintains appropriate verbal boundaries   Participation Initiates participation;Independently participates   Concentration Concentrates 50 minutes   Ability to Concentrate Without difficulty   Follows and Comprehends Directions Independently follows multi-step directions   Memory Delayed and immediate recall intact  (continue to assess throughout ECT)   Organization Independently organizes all tasks   Decision Making Independent   Planning and Problem Solving Independently plans ahead   Ability to Apply and Learn Concepts Applies within group structure   Frustrations / Stress Tolerance Independently identifies sources of frustration/stress;Independently identifies skills   (stressors: loneliness, exacerbation of depressive symptoms. coping skills: spending time with pets and family)   Level of Insight Insightful into needs, issues, goals   Self Esteem Can identify positives  (\"patient, " "caring\")   Social Supports Identifies utilizing supports  (identified several family members, pets, and one friends)     Pt identified interest to explore healthy coping strategies via group games, cooking/baking, and wellness practices in hopes to improve overall mood and MH stability.    Assessment: Pt would benefit from engagement in OT groups that support healthy recovery, specifically exploration of positive coping skills for symptom management/relapse prevention.     Plan: Initiate occupational therapy goals per plan of care.     Pt will practice using >2 new coping strategies to manage stress and reduce symptoms to demonstrate increased readiness for discharge.       "

## 2019-10-30 NOTE — PLAN OF CARE
"Pt was visible in the milieu and social with others. Presents with full range affect. Pt denies SI/SIB/AH/VH. Pt rates depression \"5\". Pt endorsed for pain on her nose and prn Tylenol was administered. Pt took a nap this evening and therefore missed the community meeting.   "

## 2019-10-30 NOTE — ANESTHESIA POSTPROCEDURE EVALUATION
Anesthesia POST Procedure Evaluation    Patient: Zuleyma Reed   MRN:     [id number] Gender:   female   Age:    49 year old :      1970        Preoperative Diagnosis: * No pre-op diagnosis entered *   * No procedures listed *   Postop Comments: No value filed.       Anesthesia Type:  Not documented  No value filed.    Reportable Event: NO     PAIN: Uncomplicated   Sign Out status: Comfortable, Well controlled pain     PONV: No PONV   Sign Out status:  No Nausea or Vomiting     Neuro/Psych: Uneventful perioperative course   Sign Out Status: Preoperative baseline; Age appropriate mentation     Airway/Resp.: Uneventful perioperative course   Sign Out Status: Non labored breathing, age appropriate RR; Resp. Status within EXPECTED Parameters     CV: Uneventful perioperative course   Sign Out status: Appropriate BP and perfusion indices; Appropriate HR/Rhythm     Disposition:   Sign Out in:  PACU  Disposition:  Phase II; Home  Recovery Course: Uneventful  Follow-Up: Not required           Last Anesthesia Record Vitals:  CRNA VITALS  10/30/2019 0906 - 10/30/2019 0937      10/30/2019             Pulse:  85    Ht Rate:  63    SpO2:  100 %    Resp Rate (set):  8          Last PACU Vitals:  Vitals Value Taken Time   BP     Temp     Pulse     Resp     SpO2     Temp src     NIBP 181/108 10/30/2019  9:33 AM   Pulse 85 10/30/2019  9:36 AM   SpO2 100 % 10/30/2019  9:36 AM   Resp     Temp     Ht Rate 63 10/30/2019  9:36 AM   Temp 2           Electronically Signed By: Raymond Webster DO, 2019, 9:37 AM

## 2019-10-30 NOTE — PLAN OF CARE
BEHAVIORAL TEAM DISCUSSION    Participants:   Dr. Rodriguez, Dr. Pak, Dr. Downing, Zuleyma Love MA.LP, Jyotsna Mckeon RN, Med students    Progress:   Patient has had 2 ECT treatments to date without complication. Reports mood is slightly better. Denies suicidal ideation.  Patient has been social/active on unit.    Anticipated length of stay:   1 day    Continued Stay Criteria/Rationale:   ECT today    Medical/Physical:   Stable    Precautions:   Behavioral Orders   Procedures     Code 2     Electroconvulsive therapy     Start R UBUL ECT 10/25     Electroconvulsive therapy     Series of up to 12 treatments. Begin Date: 10/25     Treating Psychiatrist providing ECT:  vincent     Notified on:  10/23     Electroconvulsive therapy: Begin Outpatient     Series of up to 12 treatments. Begin Date: 11/1/2019    Treating Psychiatrist providing ECT:  Dr. Dmitry Newton  Notified on:  10/18/2019     Routine Programming     As clinically indicated     Status 15     Every 15 minutes.     Suicide precautions     Patients on Suicide Precautions should have a Combination Diet ordered that includes a Diet selection(s) AND a Behavioral Tray selection for Safe Tray - with utensils, or Safe Tray - NO utensils       Plan:   Patient will have ECT #3 today.  Family mtg scheduled for tomorrow with plan to discharge afterward.  Patient will continue ECT as an outpatient.  Parents will transport and care for her.  Patient has been referred for therapy and will resume care with Psychiatry.    Rationale for change in precautions or plan:   Discharge tomorrow

## 2019-10-30 NOTE — PROGRESS NOTES
Pt had ECT treatment and after treatment felt cold and weird. She denies suicidal ideations, hallucinations or SIB. She did not attend groups this shift due to the treatment. She napped and ate in her room. She reported that she is going home tomorrow.     10/30/19 1400   Behavioral Health   Hallucinations denies / not responding to hallucinations   Thinking intact   Orientation time: oriented;date: oriented;place: oriented;person: oriented   Memory baseline memory   Insight insight appropriate to situation   Judgement intact   Eye Contact at examiner   Affect tense   Mood depressed;mood is calm   Physical Appearance/Attire appears stated age   Hygiene well groomed   Suicidality other (see comments)  (Denies)   1. Wish to be Dead (Recent) No   2. Non-Specific Active Suicidal Thoughts (Recent) No   Self Injury other (see comment)  (Denies)   Activity other (see comment)  (Social with peers)   Speech clear;coherent   Medication Sensitivity no stated side effects   Psychomotor / Gait balanced;steady   Psycho Education   Type of Intervention 1:1 intervention   Response participates, initiates socially appropriate   Hours 0.5   Activities of Daily Living   Hygiene/Grooming independent   Oral Hygiene independent   Dress street clothes   Laundry with supervision   Room Organization independent   Activity   Activity Assistance Provided independent

## 2019-10-30 NOTE — ANESTHESIA PREPROCEDURE EVALUATION
Anesthesia Pre-Procedure Evaluation    Patient: Zuleyma Reed   MRN:     [id number] Gender:   female   Age:    49 year old :      1970        Preoperative Diagnosis: * No pre-op diagnosis entered *   * No procedures listed *     Past Medical History:   Diagnosis Date     Depression with anxiety       Past Surgical History:   Procedure Laterality Date     ORTHOPEDIC SURGERY                     PHYSICAL EXAM:   Mental Status/Neuro: A/A/O   Airway: Facies: Feasible  Mallampati: II  Mouth/Opening: Full  TM distance: > 6 cm  Neck ROM: Full   Respiratory: Auscultation: CTAB     Resp. Rate: Normal     Resp. Effort: Normal      CV: Rhythm: Regular  Rate: Age appropriate  Heart: Normal Sounds  Edema: None   Comments:      Dental: Normal Dentition                LABS:  CBC:   Lab Results   Component Value Date    WBC 9.3 10/23/2019    WBC 8.2 2019    HGB 15.0 10/23/2019    HGB 15.7 2019    HCT 45.1 10/23/2019    HCT 46.4 2019     10/23/2019     2019     BMP:   Lab Results   Component Value Date     10/23/2019     2019    POTASSIUM 4.1 10/23/2019    POTASSIUM 4.1 2019    CHLORIDE 107 10/23/2019    CHLORIDE 109 2019    CO2 27 10/23/2019    CO2 28 2019    BUN 11 10/23/2019    BUN 10 2019    CR 0.85 10/23/2019    CR 0.92 2019    GLC 96 10/23/2019     (H) 2019     COAGS: No results found for: PTT, INR, FIBR  POC:   Lab Results   Component Value Date    HCG Negative 10/24/2019     OTHER:   Lab Results   Component Value Date    SHANA 8.5 10/23/2019    MAG 2.2 2006    ALBUMIN 3.6 10/23/2019    PROTTOTAL 7.1 10/23/2019    ALT 20 10/23/2019    AST 8 10/23/2019    ALKPHOS 61 10/23/2019    BILITOTAL 0.9 10/23/2019    LIPASE 65 2006    TSH 3.34 10/23/2019        Preop Vitals    BP Readings from Last 3 Encounters:   10/30/19 130/81   10/04/19 (!) 142/90   10/01/19 130/82    Pulse Readings from Last 3 Encounters:   10/30/19  "77   10/04/19 84   10/01/19 88      Resp Readings from Last 3 Encounters:   10/30/19 16   04/11/19 16   02/02/19 14    SpO2 Readings from Last 3 Encounters:   10/30/19 100%   10/04/19 99%   10/01/19 98%      Temp Readings from Last 1 Encounters:   10/30/19 36.7  C (98.1  F) (Oral)    Ht Readings from Last 1 Encounters:   10/22/19 1.74 m (5' 8.5\")      Wt Readings from Last 1 Encounters:   10/29/19 91.1 kg (200 lb 12.8 oz)    Estimated body mass index is 30.09 kg/m  as calculated from the following:    Height as of this encounter: 1.74 m (5' 8.5\").    Weight as of this encounter: 91.1 kg (200 lb 12.8 oz).     LDA:  Peripheral IV 10/30/19 Right;Dorsal Hand (Active)   Number of days: 0        Assessment:   ASA SCORE: 2            Plan:   Anes. Type:  General   Pre-Medication: None   Induction:  IV (Standard)   Airway: ETT; Oral   Access/Monitoring: PIV   Maintenance: Balanced     Postop Plan:   Postop Pain: Opioids  Postop Sedation/Airway: Not planned     PONV Management:   Adult Risk Factors: Female, Postop Opioids                   Raymond Webster DO  "
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
## 2019-10-30 NOTE — PROGRESS NOTES
Pt returned to unit from ECT.  Pt denied headache.  Pt did not know the day of the week,  But new the year and place.  Pt her stomach upset upset given ginger ale.  Pt later co of headache given PRN Tylenol.  Pt stated she did not remember waking up from ECT today,  Pt ate some of lunch

## 2019-10-31 ENCOUNTER — TELEPHONE (OUTPATIENT)
Dept: PSYCHIATRY | Facility: CLINIC | Age: 49
End: 2019-10-31

## 2019-10-31 VITALS
HEART RATE: 72 BPM | OXYGEN SATURATION: 95 % | DIASTOLIC BLOOD PRESSURE: 83 MMHG | WEIGHT: 200.1 LBS | HEIGHT: 69 IN | BODY MASS INDEX: 29.64 KG/M2 | RESPIRATION RATE: 16 BRPM | TEMPERATURE: 98.6 F | SYSTOLIC BLOOD PRESSURE: 127 MMHG

## 2019-10-31 PROCEDURE — 99239 HOSP IP/OBS DSCHRG MGMT >30: CPT | Mod: GC | Performed by: PSYCHIATRY & NEUROLOGY

## 2019-10-31 RX ORDER — TRAZODONE HYDROCHLORIDE 50 MG/1
50 TABLET, FILM COATED ORAL
Qty: 30 TABLET | Refills: 0 | Status: SHIPPED | OUTPATIENT
Start: 2019-10-31 | End: 2020-01-14

## 2019-10-31 RX ORDER — POLYETHYLENE GLYCOL 3350 17 G
2-4 POWDER IN PACKET (EA) ORAL
Qty: 90 LOZENGE | Refills: 0 | Status: SHIPPED | OUTPATIENT
Start: 2019-10-31 | End: 2019-11-08

## 2019-10-31 RX ORDER — SUMATRIPTAN 25 MG/1
25 TABLET, FILM COATED ORAL
Qty: 10 TABLET | Refills: 0 | Status: SHIPPED | OUTPATIENT
Start: 2019-10-31 | End: 2019-12-02

## 2019-10-31 RX ORDER — CYCLOBENZAPRINE HCL 5 MG
5-10 TABLET ORAL 3 TIMES DAILY PRN
Qty: 90 TABLET | Refills: 0 | Status: SHIPPED | OUTPATIENT
Start: 2019-10-31 | End: 2020-03-11

## 2019-10-31 RX ORDER — VILAZODONE HYDROCHLORIDE 10 MG/1
20 TABLET ORAL AT BEDTIME
Qty: 60 TABLET | Refills: 0 | Status: SHIPPED | OUTPATIENT
Start: 2019-10-31 | End: 2019-12-04

## 2019-10-31 RX ORDER — LORAZEPAM 0.5 MG/1
0.5 TABLET ORAL EVERY 6 HOURS PRN
Qty: 10 TABLET | Refills: 0 | Status: SHIPPED | OUTPATIENT
Start: 2019-10-31 | End: 2019-11-18

## 2019-10-31 ASSESSMENT — ACTIVITIES OF DAILY LIVING (ADL)
DRESS: INDEPENDENT
ORAL_HYGIENE: INDEPENDENT
HYGIENE/GROOMING: INDEPENDENT

## 2019-10-31 ASSESSMENT — MIFFLIN-ST. JEOR: SCORE: 1599.09

## 2019-10-31 NOTE — PROGRESS NOTES
"10/30/19 2100     Art Therapy   Type of Intervention structured groups   Response Participated with encouragement   Hours 1   Treatment Detail    Art Therapy- bryan resist- Halloween theme   Goal- cope, regulate and express through Art Therapy directive     Outcome- pt attended the Art Therapy group.  She made a thoughtful project . She most often introduces her art by saying she is not very creative, and she does very thoughtful work. She painted a spider she said \" even though I broke my nose running away from a spider on the unit \". She also said she had a headache from ECT but it was good to have the group for distraction as she said she was looking forward to it being that she couldn't attend groups during the day due to after effects of ECT. She is nervous to go home and be alone. She is excited to see her animals but felt supported on the unit and will miss that.        "

## 2019-10-31 NOTE — TELEPHONE ENCOUNTER
On 10/31/2019 12 pages of Gene Sight test results were received. The original copies were sent to scanning and copies were put in Crystal Reece's folder for review with patient.Maddy Amin LPN

## 2019-10-31 NOTE — PROGRESS NOTES
Pt is requesting discharge and is being discharged at this time. Pt is returning home vis her parents after meeting with psychiatry team this morning. Pt has been calm, cooperative, and pleasant this shift. Pt denies depression and anxiety as well as SI/SIB. Pt denies all other mental health symptoms including hallucinations and has not been observed exhibiting such symptoms. Pt is aware of and in agreement with therapeutic plan in place including the continuation of her ECT treatments. Pt is leaving with prescribed medications and a script for Ativan.

## 2019-10-31 NOTE — DISCHARGE INSTRUCTIONS
Behavioral Discharge Planning and Instructions    Summary:   You were admitted to [facility] on 11/22/19  with worsening depression, suicidal ideation under the care of Dr. Rodriguez.  You met with Dr. Rodriguez and his team daily for ongoing psychiatric assessment and medication management.  You had opportunities to participate in therapeutic groups on the unit.   You elected to begin ECT treatment and completed 3  treatments while inpatient without complication.  You will continue treatments on an outpatient basis.  At this time you report your mood is stabilizing and you report you are not having thoughts or intent to harm yourself or others. You will be discharged home and will resume care with your outpatient providers.    You have received outpatient ECT instructions  You may call the ECT office at [phone] between 7 AM and 2 PM on Monday, Wednesday and Friday about scheduling issues. On other days, you will have to leave a voice message which will be retrieved the next ECT day. For all clinical questions for Dr. Newton, call the clinic at [phone] and ask to speak to Pily Gardner RN who will direct any questions to Dr. Newton.           For further information about ECT, go to these websites:              [email or web address]           https://www.youtube.com/watch?v=WKdv6at3lny           https://www.youDream Industriesube.com/watch?v=QmrC20YU1Ga         https://www.isen-ect.org/educational-content  http://www.[facility].org/tests-procedures/electroconvulsive-therapy/basics/definition/prc-43473864             Start using a calendar and notebook or apps on your smartphone to keep track of appointments, conversations,  and other things you need to remember, as this will be more helpful as the ECT continues.            Start an ECT journal to track your progress. Spend a few minutes writing down how you feel now before ECT and why you are doing ECT. Throughout the course of treatments (probably this will be  easier on non-ECT days), write what changes you are noticing in your depression, daily activities or side effects of treatment. This can be useful later on in the ECT if you are having some memory loss day-to-day and can't recall how you used to feel. If you have trouble writing this, try recording a video on non-ECT days.describing the above.           Eat healthily, keep a regular sleep schedule, exercise or other physical activity at least on non-ECT days as able.            Keep mentally active by reading, doing crossword puzzles or other word games, play video or other games. You can improve your memory for events happening over the previous few days by regularly reviewing things that happened over that period of time which will refresh your memory    Disposition:  Home    Main Diagnosis:   Major Depressive Disorder    Major Treatments, Procedures and Findings:   Medications were  managed throughout your stay. An internal medicine consult was completed during your stay. You had the opportunity to participate in treatment programming while on the unit including occupational therapy, mental health support and education and spiritual services.   You received 3 ECT treatments     Symptoms to Report:   Please report if you are experiencing increased aggression and/or confusion, problematic loss of sleep, worsening mood, or thoughts of suicide to your treatment team or notify your primary provider.   IF THE SYMPTOMS YOU ARE EXPERIENCING ARE A MEDICAL EMERGENCY, CALL 911 IMMEDIATELY    Lifestyle Adjustment:   1. Adjust your lifestyle to get enough sleep, relaxation, exercise and good nutrition.  Continue to develop healthy coping skills to decrease stress and promote a healthy lifestyle.  2. Abstain from all substances of abuse.  3. Take medications as prescribed.  Please work with your doctor to discuss any concerns you have with your medications or side effects you may be experiencing.  4. Follow up with  appointments as scheduled.        Psychiatry Follow-up:   Appointment: ECT:  11/1/19 at 10:15am   (PLease remember to remain NPO after midnight 10/31/19)  [facility] [address]  [address] [place] [address]  [phone]    Appointment: Therapist: Floridalma Talbertler: 11/15/19 at 8:00am  [facility]: [facility] [address], [place], [place] [address] [phone]    Appointment: Psychiatry: Crystal BURDEN CNP:  11/21/19 at 11:30am  [facility]: [facility]. [address]nd [address]  [place] [address]  [phone]      Resources:   [facility] Crisis:  Call [phone] to reach [facility] Crisis Response.  Mobile Crisis Response  [facility] mobile crisis response team responds to mental health crises or emergencies with face-to-face assessment, intervention and stabilization services at home, school or in the community. Emergencies can be a behavioral, emotional or psychiatric situation needing a timely intervention to reduce the possibility of physical harm. Mobile crisis response staff will also provide necessary safety planning and short-term therapeutic services to assist in recovery from a crisis.      General Medication Instructions:   See your medication sheet(s) for instructions.   Take all medicines as directed.  Make no changes unless your doctor suggests them.   Go to all your doctor visits.  Be sure to have all your required lab tests. This way, your medicines can be refilled on time.  Do not use any drugs not prescribed by your doctor.    The treatment team has appreciated the opportunity to work with you.  We wish you the best in the future.    If you have any questions or concerns our unit number is [phone].
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
## 2019-10-31 NOTE — PROGRESS NOTES
Social in the milieu. Full range affect, calm. Complaint of headache due ECT in late evening. Reports tough day due to ECT. Ate meal.         10/30/19 1900   Behavioral Health   Hallucinations denies / not responding to hallucinations   Thinking intact   Orientation person: oriented;place: oriented;date: oriented;other (see comment)   Memory baseline memory   Insight insight appropriate to situation   Judgement intact   Eye Contact at examiner   Affect full range affect   Mood mood is calm   Physical Appearance/Attire attire appropriate to age and situation   Hygiene well groomed   1. Wish to be Dead (Recent) No   2. Non-Specific Active Suicidal Thoughts (Recent) No   Activity other (see comment)  (Social in the milieu)   Speech clear;coherent   Psychomotor / Gait balanced;steady   Activities of Daily Living   Hygiene/Grooming independent   Oral Hygiene independent   Dress independent;street clothes   Laundry with supervision   Room Organization independent

## 2019-11-01 ENCOUNTER — ANESTHESIA (OUTPATIENT)
Dept: BEHAVIORAL HEALTH | Facility: CLINIC | Age: 49
End: 2019-11-01

## 2019-11-01 ENCOUNTER — HOSPITAL ENCOUNTER (OUTPATIENT)
Dept: BEHAVIORAL HEALTH | Facility: CLINIC | Age: 49
Discharge: HOME | End: 2019-11-01
Attending: PSYCHIATRY & NEUROLOGY | Admitting: PSYCHIATRY & NEUROLOGY
Payer: COMMERCIAL

## 2019-11-01 ENCOUNTER — ANESTHESIA EVENT (OUTPATIENT)
Dept: BEHAVIORAL HEALTH | Facility: CLINIC | Age: 49
End: 2019-11-01

## 2019-11-01 VITALS
HEART RATE: 91 BPM | SYSTOLIC BLOOD PRESSURE: 135 MMHG | OXYGEN SATURATION: 97 % | DIASTOLIC BLOOD PRESSURE: 92 MMHG | RESPIRATION RATE: 16 BRPM | TEMPERATURE: 98.6 F

## 2019-11-01 DIAGNOSIS — F33.2 SEVERE EPISODE OF RECURRENT MAJOR DEPRESSIVE DISORDER, WITHOUT PSYCHOTIC FEATURES (H): Primary | ICD-10-CM

## 2019-11-01 PROCEDURE — 90870 ELECTROCONVULSIVE THERAPY: CPT

## 2019-11-01 PROCEDURE — 37000008 ZZH ANESTHESIA TECHNICAL FEE, 1ST 30 MIN

## 2019-11-01 PROCEDURE — 25000128 H RX IP 250 OP 636: Performed by: ANESTHESIOLOGY

## 2019-11-01 PROCEDURE — 25000125 ZZHC RX 250: Performed by: ANESTHESIOLOGY

## 2019-11-01 RX ORDER — NALOXONE HYDROCHLORIDE 0.4 MG/ML
.1-.4 INJECTION, SOLUTION INTRAMUSCULAR; INTRAVENOUS; SUBCUTANEOUS
Status: CANCELLED | OUTPATIENT
Start: 2019-11-01 | End: 2019-11-02

## 2019-11-01 RX ORDER — FENTANYL CITRATE 50 UG/ML
25-50 INJECTION, SOLUTION INTRAMUSCULAR; INTRAVENOUS
Status: CANCELLED | OUTPATIENT
Start: 2019-11-01

## 2019-11-01 RX ORDER — ONDANSETRON 4 MG/1
4 TABLET, ORALLY DISINTEGRATING ORAL EVERY 30 MIN PRN
Status: DISCONTINUED | OUTPATIENT
Start: 2019-11-01 | End: 2019-11-02 | Stop reason: HOSPADM

## 2019-11-01 RX ORDER — NALOXONE HYDROCHLORIDE 0.4 MG/ML
.1-.4 INJECTION, SOLUTION INTRAMUSCULAR; INTRAVENOUS; SUBCUTANEOUS
Status: DISCONTINUED | OUTPATIENT
Start: 2019-11-01 | End: 2019-11-02 | Stop reason: HOSPADM

## 2019-11-01 RX ORDER — ONDANSETRON 2 MG/ML
4 INJECTION INTRAMUSCULAR; INTRAVENOUS EVERY 30 MIN PRN
Status: DISCONTINUED | OUTPATIENT
Start: 2019-11-01 | End: 2019-11-02 | Stop reason: HOSPADM

## 2019-11-01 RX ORDER — HYDRALAZINE HYDROCHLORIDE 20 MG/ML
2.5-5 INJECTION INTRAMUSCULAR; INTRAVENOUS EVERY 10 MIN PRN
Status: CANCELLED | OUTPATIENT
Start: 2019-11-01

## 2019-11-01 RX ORDER — LABETALOL 20 MG/4 ML (5 MG/ML) INTRAVENOUS SYRINGE
10
Status: CANCELLED | OUTPATIENT
Start: 2019-11-01

## 2019-11-01 RX ORDER — SODIUM CHLORIDE, SODIUM LACTATE, POTASSIUM CHLORIDE, CALCIUM CHLORIDE 600; 310; 30; 20 MG/100ML; MG/100ML; MG/100ML; MG/100ML
INJECTION, SOLUTION INTRAVENOUS CONTINUOUS
Status: DISCONTINUED | OUTPATIENT
Start: 2019-11-01 | End: 2019-11-02 | Stop reason: HOSPADM

## 2019-11-01 RX ORDER — ACETAMINOPHEN 325 MG/1
975 TABLET ORAL EVERY 6 HOURS PRN
COMMUNITY

## 2019-11-01 RX ORDER — ONDANSETRON 4 MG/1
4 TABLET, ORALLY DISINTEGRATING ORAL EVERY 30 MIN PRN
Status: CANCELLED | OUTPATIENT
Start: 2019-11-01

## 2019-11-01 RX ORDER — SODIUM CHLORIDE, SODIUM LACTATE, POTASSIUM CHLORIDE, CALCIUM CHLORIDE 600; 310; 30; 20 MG/100ML; MG/100ML; MG/100ML; MG/100ML
INJECTION, SOLUTION INTRAVENOUS CONTINUOUS
Status: CANCELLED | OUTPATIENT
Start: 2019-11-01

## 2019-11-01 RX ORDER — FENTANYL CITRATE 50 UG/ML
25-50 INJECTION, SOLUTION INTRAMUSCULAR; INTRAVENOUS
Status: DISCONTINUED | OUTPATIENT
Start: 2019-11-01 | End: 2019-11-02 | Stop reason: HOSPADM

## 2019-11-01 RX ORDER — ONDANSETRON 2 MG/ML
4 INJECTION INTRAMUSCULAR; INTRAVENOUS EVERY 30 MIN PRN
Status: CANCELLED | OUTPATIENT
Start: 2019-11-01

## 2019-11-01 RX ADMIN — METHOHEXITAL SODIUM 90 MG: 500 INJECTION, POWDER, LYOPHILIZED, FOR SOLUTION INTRAMUSCULAR; INTRAVENOUS; RECTAL at 11:54

## 2019-11-01 RX ADMIN — Medication 90 MG: at 11:54

## 2019-11-01 NOTE — ANESTHESIA POSTPROCEDURE EVALUATION
Anesthesia POST Procedure Evaluation    Patient: Zuleyma Reed   MRN:     [id number] Gender:   female   Age:    49 year old :      1970        Preoperative Diagnosis: * No pre-op diagnosis entered *   * No procedures listed *   Postop Comments: No value filed.       Anesthesia Type:  Not documented  No value filed.    Reportable Event: NO     PAIN: Uncomplicated   Sign Out status: Comfortable, Well controlled pain     PONV: No PONV   Sign Out status:  No Nausea or Vomiting     Neuro/Psych: Uneventful perioperative course   Sign Out Status: Preoperative baseline; Age appropriate mentation     Airway/Resp.: Uneventful perioperative course   Sign Out Status: Non labored breathing, age appropriate RR; Resp. Status within EXPECTED Parameters     CV: Uneventful perioperative course   Sign Out status: Appropriate BP and perfusion indices; Appropriate HR/Rhythm     Disposition:   Sign Out in:  PACU  Disposition:  Phase II; Home  Recovery Course: Uneventful  Follow-Up: Not required           Last Anesthesia Record Vitals:  CRNA VITALS  2019 1134 - 2019 1207      2019             Pulse:  98    Ht Rate:  98    SpO2:  98 %    Resp Rate (set):  8          Last PACU Vitals:  Vitals Value Taken Time   BP     Temp     Pulse     Resp     SpO2     Temp src     NIBP 159/121 2019 12:02 PM   Pulse 98 2019 12:04 PM   SpO2 98 % 2019 12:04 PM   Resp     Temp     Ht Rate 98 2019 12:04 PM   Temp 2           Electronically Signed By: Raymond Webster DO, 2019, 12:07 PM

## 2019-11-01 NOTE — IP AVS SNAPSHOT
[facility]  [facility] [address] [address]  Phone:  [phone]                                    After Visit Summary   11/1/2019    Zuleyma Reed    MRN: [id number]           After Visit Summary Signature Page    I have received my discharge instructions, and my questions have been answered. I have discussed any challenges I see with this plan with the nurse or doctor.    ..........................................................................................................................................  Patient/Patient Representative Signature      ..........................................................................................................................................  Patient Representative Print Name and Relationship to Patient    ..................................................               ................................................  Date                                   Time    ..........................................................................................................................................  Reviewed by Signature/Title    ...................................................              ..............................................  Date                                               Time          22EPIC Rev 08/18
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
## 2019-11-01 NOTE — DISCHARGE INSTRUCTIONS
ECT Discharge Instructions      During your ECT series:      Do not drive or work heavy equipment until 7 days after your last treatment.    Do not drink alcohol or use street drugs (illicit drugs) while you are having treatments.    Do not make important decisions, including legal decisions.    After each treatment:      Get plenty of rest. A responsible adult must stay with your for at least 6 hours.    Avoid heavy or risky activities for 24 hours.    If you feel light-headed, sit for a few minutes before standing. Have someone help you get up.    If you have nausea (feel sick to your stomach): Drink only clear liquids such as apple juice, ginger ale, broth or 7UP, Be sure to drink plenty of liquids. Move to a normal diet as you feel able.     If you received Toradol, wait 6 hours before taking ibuprofen.    Call your doctor if:     You have a fever over 100F (37.7 C) (taken under the tongue), or a fever that last more than 24 hours.    Your IV site is red, swollen, very painful or is getting more tender.    You have nausea that gets very bad or does not improve.      If you have any symptoms after ECT, tell our staff before your next treatment.    The ECT Department can be reached at [phone].  The ECT Department is open Mondays, Wednesdays and Fridays from 7:00 AM to 2:00 PM.      To speak to a doctor, call: Dr. Dmitry Newton: Office # [phone] and Fax # [phone]    Transported by: Father      _________________________________________________  ________________________  Patient/Responsible party Signature      Date          ________________________________________________   ________________________  Nurse Signature        Date

## 2019-11-01 NOTE — ANESTHESIA PREPROCEDURE EVALUATION
Anesthesia Pre-Procedure Evaluation    Patient: Zuleyma Reed   MRN:     [id number] Gender:   female   Age:    49 year old :      1970        Preoperative Diagnosis: * No pre-op diagnosis entered *   * No procedures listed *     Past Medical History:   Diagnosis Date     Depression with anxiety       Past Surgical History:   Procedure Laterality Date     ORTHOPEDIC SURGERY                     PHYSICAL EXAM:   Mental Status/Neuro: A/A/O   Airway: Facies: Feasible  Mallampati: II  Mouth/Opening: Full  TM distance: > 6 cm  Neck ROM: Full   Respiratory: Auscultation: CTAB     Resp. Rate: Normal     Resp. Effort: Normal      CV: Rhythm: Regular  Rate: Age appropriate  Heart: Normal Sounds  Edema: None   Comments:      Dental: Normal Dentition                LABS:  CBC:   Lab Results   Component Value Date    WBC 9.3 10/23/2019    WBC 8.2 2019    HGB 15.0 10/23/2019    HGB 15.7 2019    HCT 45.1 10/23/2019    HCT 46.4 2019     10/23/2019     2019     BMP:   Lab Results   Component Value Date     10/23/2019     2019    POTASSIUM 4.1 10/23/2019    POTASSIUM 4.1 2019    CHLORIDE 107 10/23/2019    CHLORIDE 109 2019    CO2 27 10/23/2019    CO2 28 2019    BUN 11 10/23/2019    BUN 10 2019    CR 0.85 10/23/2019    CR 0.92 2019    GLC 96 10/23/2019     (H) 2019     COAGS: No results found for: PTT, INR, FIBR  POC:   Lab Results   Component Value Date    HCG Negative 10/24/2019     OTHER:   Lab Results   Component Value Date    SHANA 8.5 10/23/2019    MAG 2.2 2006    ALBUMIN 3.6 10/23/2019    PROTTOTAL 7.1 10/23/2019    ALT 20 10/23/2019    AST 8 10/23/2019    ALKPHOS 61 10/23/2019    BILITOTAL 0.9 10/23/2019    LIPASE 65 2006    TSH 3.34 10/23/2019        Preop Vitals    BP Readings from Last 3 Encounters:   19 113/78   10/30/19 127/83   10/04/19 (!) 142/90    Pulse Readings from Last 3 Encounters:   19  "73   10/30/19 72   10/04/19 84      Resp Readings from Last 3 Encounters:   11/01/19 16   10/31/19 16   04/11/19 16    SpO2 Readings from Last 3 Encounters:   11/01/19 99%   10/30/19 95%   10/04/19 99%      Temp Readings from Last 1 Encounters:   11/01/19 37.2  C (99  F) (Tympanic)    Ht Readings from Last 1 Encounters:   10/22/19 1.74 m (5' 8.5\")      Wt Readings from Last 1 Encounters:   10/31/19 90.8 kg (200 lb 1.6 oz)    Estimated body mass index is 29.98 kg/m  as calculated from the following:    Height as of 10/22/19: 1.74 m (5' 8.5\").    Weight as of 10/31/19: 90.8 kg (200 lb 1.6 oz).     LDA:  Peripheral IV 11/01/19 Left Hand (Active)   Number of days: 0        Assessment:   ASA SCORE: 2            Plan:   Anes. Type:  General   Pre-Medication: None   Induction:  IV (Standard)   Airway: Mask   Access/Monitoring: PIV   Maintenance: N/a     Postop Plan:   Postop Pain: Opioids  Postop Sedation/Airway: Not planned     PONV Management:   Adult Risk Factors: Female, Postop Opioids                   Raymond Webster DO  "

## 2019-11-01 NOTE — IP AVS SNAPSHOT
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      Dose:  5-10 mg  Take 1-2 tablets (5-10 mg) by mouth 3 times daily as needed for muscle spasms (severe pain)  Quantity:  90 tablet  Refills:  0     LORazepam 0.5 MG tablet  Commonly known as:  ATIVAN  Used for:  Generalized anxiety disorder      Dose:  0.5 mg  Take 1 tablet (0.5 mg) by mouth every 6 hours as needed for anxiety  Quantity:  10 tablet  Refills:  0     nicotine 2 MG lozenge  Commonly known as:  COMMIT  Used for:  Tobacco dependence syndrome      Dose:  2-4 mg  Place 1-2 lozenges (2-4 mg) inside cheek every hour as needed for other (nicotine withdrawal symptoms)  Quantity:  90 lozenge  Refills:  0     sodium chloride 0.65 % nasal spray  Commonly known as:  OCEAN  Used for:  Seasonal allergic rhinitis due to pollen      Dose:  1 spray  Spray 1 spray into both nostrils every hour as needed for congestion  Quantity:  1 Bottle  Refills:  0     SUMAtriptan 25 MG tablet  Commonly known as:  IMITREX  Used for:  Acute non intractable tension-type headache      Dose:  25 mg  Take 1 tablet (25 mg) by mouth at onset of headache for migraine or other (post ECT headache) May repeat in 2 hours. Max 8 tablets/24 hours.  Quantity:  10 tablet  Refills:  0     traZODone 50 MG tablet  Commonly known as:  DESYREL  Used for:  Severe episode of recurrent major depressive disorder,  without psychotic features (H)      Dose:  50 mg  Take 1 tablet (50 mg) by mouth nightly as needed for sleep  Quantity:  30 tablet  Refills:  0     vilazodone 10 MG Tabs tablet  Commonly known as:  VIIBRYD  Used for:  Severe episode of recurrent major depressive disorder, without psychotic features (H)      Dose:  20 mg  Take 2 tablets (20 mg) by mouth At Bedtime  Quantity:  60 tablet  Refills:  0              Protect others around you: Learn how to safely use, store and throw away your medicines at www.disposemymeds.org.       Follow-ups after your visit       Your next 10 appointments already scheduled    Nov 04, 2019 10:00 AM CST  Electroconvulsive Therapy with Dmitry Newton MD  [facility] ([facility]) [address] [address]  [phone]      Nov 21, 2019 11:30 AM CST  Adult Med Follow UP with [facility] ([facility]) [place] [id number]  [address] [place] [address]  [phone]         Care Instructions       Further instructions from your care team       ECT Discharge Instructions      During your ECT series:      Do not drive or work heavy equipment until 7 days after your last treatment.    Do not drink alcohol or use street drugs (illicit drugs) while you are having treatments.    Do not make important decisions, including legal decisions.    After each treatment:      Get plenty of rest. A responsible adult must stay with your for at least 6 hours.    Avoid heavy or risky activities for 24 hours.    If you feel light-headed, sit for a few minutes before standing. Have someone help you get up.    If you have nausea (feel sick to your stomach): Drink only clear liquids such as apple juice, ginger ale, broth or 7UP, Be sure to drink plenty of liquids. Move to a normal diet as you feel able.     If you received Toradol, wait 6 hours before taking  ibuprofen.    Call your doctor if:     You have a fever over 100F (37.7 C) (taken under the tongue), or a fever that last more than 24 hours.    Your IV site is red, swollen, very painful or is getting more tender.    You have nausea that gets very bad or does not improve.      If you have any symptoms after ECT, tell our staff before your next treatment.    The ECT Department can be reached at [phone].  The ECT Department is open Mondays, Wednesdays and Fridays from 7:00 AM to 2:00 PM.      To speak to a doctor, call: Dr. Dmitry Newton: Office # [phone] and Fax # [phone]    Transported by: Father      _________________________________________________  ________________________  Patient/Responsible party Signature      Date          ________________________________________________   ________________________  Nurse Signature        Date    Additional Information About Your Visit       MyChart Information    [employer] gives you secure access to your electronic health record. If you see a primary care provider, you can also send messages to your care team and make appointments. If you have questions, please call your primary care clinic.  If you do not have a primary care provider, please call [phone] and they will assist you.       Care EveryWhere ID    This is your Care EveryWhere ID. This could be used by other organizations to access your [place] medical records  [id number]       Your Vitals Were  Most recent update: 11/1/2019 12:22 PM    Blood Pressure   142/89      Pulse   89    Temperature   98.3  F (36.8  C) (Tympanic)    Respirations   14    Pulse Oximetry   97%          Primary Care Provider Office Phone # Fax #    [facility] [phone]      Equal Access to Services    [facility] : Garrett Hickey, robbie acosta, seema de la torre. [facility] [phone].    ATENCIÓN: Si thony vera,  tiene a jeff disposición servicios gratuitos de asistencia lingüística. Helen roblero [phone].    We comply with applicable federal civil rights laws and [place] laws. We do not discriminate on the basis of race, color, national origin, age, disability, sex, sexual orientation, or gender identity.           Thank you!    Thank you for choosing [facility] for your care. Our goal is always to provide you with excellent care. Hearing back from our patients is one way we can continue to improve our services. Please take a few minutes to complete the written survey that you may receive in the mail after you visit with us. Thank you!            Medication List      Medications       Morning Afternoon Evening Bedtime As Needed   acetaminophen 325 MG tablet  Also known as:  TYLENOL  INSTRUCTIONS:  Take 975 mg by mouth every 6 hours as needed for mild pain                    cyclobenzaprine 5 MG tablet  Also known as:  FLEXERIL  INSTRUCTIONS:  Take 1-2 tablets (5-10 mg) by mouth 3 times daily as needed for muscle spasms (severe pain)                    LORazepam 0.5 MG tablet  Also known as:  ATIVAN  INSTRUCTIONS:  Take 1 tablet (0.5 mg) by mouth every 6 hours as needed for anxiety                    nicotine 2 MG lozenge  Also known as:  COMMIT  INSTRUCTIONS:  Place 1-2 lozenges (2-4 mg) inside cheek every hour as needed for other (nicotine withdrawal symptoms)                    sodium chloride 0.65 % nasal spray  Also known as:  OCEAN  INSTRUCTIONS:  Spray 1 spray into both nostrils every hour as needed for congestion                    SUMAtriptan 25 MG tablet  Also known as:  IMITREX  INSTRUCTIONS:  Take 1 tablet (25 mg) by mouth at onset of headache for migraine or other (post ECT headache) May repeat in 2 hours. Max 8 tablets/24 hours.                    traZODone 50 MG tablet  Also known as:  DESYREL  INSTRUCTIONS:  Take 1 tablet (50 mg) by mouth nightly as needed for sleep                    vilazodone 10 MG Tabs  tablet  Also known as:  VIIBRYD  INSTRUCTIONS:  Take 2 tablets (20 mg) by mouth At Bedtime

## 2019-11-03 ENCOUNTER — HEALTH MAINTENANCE LETTER (OUTPATIENT)
Age: 49
End: 2019-11-03

## 2019-11-04 ENCOUNTER — ANESTHESIA EVENT (OUTPATIENT)
Dept: BEHAVIORAL HEALTH | Facility: CLINIC | Age: 49
End: 2019-11-04

## 2019-11-04 ENCOUNTER — HOSPITAL ENCOUNTER (OUTPATIENT)
Dept: BEHAVIORAL HEALTH | Facility: CLINIC | Age: 49
Discharge: HOME | End: 2019-11-04
Attending: PSYCHIATRY & NEUROLOGY | Admitting: PSYCHIATRY & NEUROLOGY
Payer: COMMERCIAL

## 2019-11-04 ENCOUNTER — ANESTHESIA (OUTPATIENT)
Dept: BEHAVIORAL HEALTH | Facility: CLINIC | Age: 49
End: 2019-11-04

## 2019-11-04 VITALS
DIASTOLIC BLOOD PRESSURE: 72 MMHG | OXYGEN SATURATION: 96 % | RESPIRATION RATE: 16 BRPM | SYSTOLIC BLOOD PRESSURE: 130 MMHG | HEART RATE: 97 BPM | TEMPERATURE: 97.4 F

## 2019-11-04 DIAGNOSIS — F33.2 SEVERE EPISODE OF RECURRENT MAJOR DEPRESSIVE DISORDER, WITHOUT PSYCHOTIC FEATURES (H): Primary | ICD-10-CM

## 2019-11-04 PROCEDURE — 25000128 H RX IP 250 OP 636: Performed by: ANESTHESIOLOGY

## 2019-11-04 PROCEDURE — 90870 ELECTROCONVULSIVE THERAPY: CPT

## 2019-11-04 PROCEDURE — 25000125 ZZHC RX 250: Performed by: ANESTHESIOLOGY

## 2019-11-04 PROCEDURE — 37000008 ZZH ANESTHESIA TECHNICAL FEE, 1ST 30 MIN

## 2019-11-04 PROCEDURE — 25000128 H RX IP 250 OP 636: Performed by: PSYCHIATRY & NEUROLOGY

## 2019-11-04 RX ORDER — KETOROLAC TROMETHAMINE 30 MG/ML
15 INJECTION, SOLUTION INTRAMUSCULAR; INTRAVENOUS
Status: COMPLETED | OUTPATIENT
Start: 2019-11-04 | End: 2019-11-04

## 2019-11-04 RX ORDER — KETOROLAC TROMETHAMINE 30 MG/ML
15 INJECTION, SOLUTION INTRAMUSCULAR; INTRAVENOUS
Status: CANCELLED
Start: 2019-11-04

## 2019-11-04 RX ADMIN — Medication 90 MG: at 11:13

## 2019-11-04 RX ADMIN — METHOHEXITAL SODIUM 90 MG: 500 INJECTION, POWDER, LYOPHILIZED, FOR SOLUTION INTRAMUSCULAR; INTRAVENOUS; RECTAL at 11:13

## 2019-11-04 RX ADMIN — KETOROLAC TROMETHAMINE 15 MG: 30 INJECTION, SOLUTION INTRAMUSCULAR at 11:30

## 2019-11-04 NOTE — ANESTHESIA PREPROCEDURE EVALUATION
Anesthesia Pre-Procedure Evaluation    Patient: Zuleyma Reed   MRN:     [id number] Gender:   female   Age:    49 year old :      1970        Preoperative Diagnosis: * No pre-op diagnosis entered *   * No procedures listed *     Past Medical History:   Diagnosis Date     Depression with anxiety       Past Surgical History:   Procedure Laterality Date     ORTHOPEDIC SURGERY                       PHYSICAL EXAM:   Mental Status/Neuro: A/A/O   Airway: Facies: Feasible  Mallampati: II  Mouth/Opening: Full  TM distance: > 6 cm  Neck ROM: Full   Respiratory: Auscultation: CTAB     Resp. Rate: Normal     Resp. Effort: Normal      CV: Rhythm: Regular  Rate: Age appropriate  Heart: Normal Sounds  Edema: None   Comments:      Dental: Normal Dentition                LABS:  CBC:   Lab Results   Component Value Date    WBC 9.3 10/23/2019    WBC 8.2 2019    HGB 15.0 10/23/2019    HGB 15.7 2019    HCT 45.1 10/23/2019    HCT 46.4 2019     10/23/2019     2019     BMP:   Lab Results   Component Value Date     10/23/2019     2019    POTASSIUM 4.1 10/23/2019    POTASSIUM 4.1 2019    CHLORIDE 107 10/23/2019    CHLORIDE 109 2019    CO2 27 10/23/2019    CO2 28 2019    BUN 11 10/23/2019    BUN 10 2019    CR 0.85 10/23/2019    CR 0.92 2019    GLC 96 10/23/2019     (H) 2019     COAGS: No results found for: PTT, INR, FIBR  POC:   Lab Results   Component Value Date    HCG Negative 10/24/2019     OTHER:   Lab Results   Component Value Date    SHANA 8.5 10/23/2019    MAG 2.2 2006    ALBUMIN 3.6 10/23/2019    PROTTOTAL 7.1 10/23/2019    ALT 20 10/23/2019    AST 8 10/23/2019    ALKPHOS 61 10/23/2019    BILITOTAL 0.9 10/23/2019    LIPASE 65 2006    TSH 3.34 10/23/2019        Preop Vitals    BP Readings from Last 3 Encounters:   19 (!) 135/92   10/30/19 127/83   10/04/19 (!) 142/90    Pulse Readings from Last 3 Encounters:  "  11/01/19 91   10/30/19 72   10/04/19 84      Resp Readings from Last 3 Encounters:   11/01/19 16   10/31/19 16   04/11/19 16    SpO2 Readings from Last 3 Encounters:   11/01/19 97%   10/30/19 95%   10/04/19 99%      Temp Readings from Last 1 Encounters:   11/01/19 37  C (98.6  F) (Tympanic)    Ht Readings from Last 1 Encounters:   10/22/19 1.74 m (5' 8.5\")      Wt Readings from Last 1 Encounters:   10/31/19 90.8 kg (200 lb 1.6 oz)    Estimated body mass index is 29.98 kg/m  as calculated from the following:    Height as of 10/22/19: 1.74 m (5' 8.5\").    Weight as of 10/31/19: 90.8 kg (200 lb 1.6 oz).     LDA:        Assessment:   ASA SCORE: 2    H&P: History and physical reviewed and following examination; no interval change.    NPO Status: NPO Appropriate     Plan:   Anes. Type:  General   Pre-Medication: None   Induction:  IV (Standard)   Airway: Mask   Access/Monitoring: PIV   Maintenance: N/a     Postop Plan:   Postop Pain: None  Postop Sedation/Airway: Not planned  Disposition: Outpatient     PONV Management: Adult Risk Factors: Female     CONSENT: Direct conversation   Plan and risks discussed with: Patient   Blood Products: Consent Deferred (Minimal Blood Loss)                       Zuleyma Sweet MD  "

## 2019-11-04 NOTE — ANESTHESIA POSTPROCEDURE EVALUATION
Anesthesia POST Procedure Evaluation    Patient: Zuleyma Reed   MRN:     [id number] Gender:   female   Age:    49 year old :      1970        Preoperative Diagnosis: * No pre-op diagnosis entered *   * No procedures listed *   Postop Comments: No value filed.       Anesthesia Type:  Not documented  General    Reportable Event: NO     PAIN: Uncomplicated   Sign Out status: Comfortable, Well controlled pain     PONV: No PONV   Sign Out status:  No Nausea or Vomiting     Neuro/Psych: Uneventful perioperative course   Sign Out Status: Preoperative baseline; Age appropriate mentation     Airway/Resp.: Uneventful perioperative course   Sign Out Status: Non labored breathing, age appropriate RR; Resp. Status within EXPECTED Parameters     CV: Uneventful perioperative course   Sign Out status: Appropriate BP and perfusion indices; Appropriate HR/Rhythm     Disposition:   Sign Out in:  PACU  Disposition:  Phase II; Home  Recovery Course: Uneventful  Follow-Up: Not required           Last Anesthesia Record Vitals:  CRNA VITALS  2019 1051 - 2019 1131      2019             Resp Rate (set):  8          Last PACU Vitals:  Vitals Value Taken Time   /89 2019 11:20 AM   Temp 37.7  C (99.9  F) 2019 11:20 AM   Pulse 92 2019 11:20 AM   Resp 16 2019 11:20 AM   SpO2 97 % 2019 11:20 AM   Temp src     NIBP     Pulse 101 2019 11:21 AM   SpO2 99 % 2019 11:21 AM   Resp     Temp     Ht Rate 88 2019 11:20 AM   Temp 2           Electronically Signed By: Zuleyma Sweet MD, 2019, 11:31 AM

## 2019-11-04 NOTE — IP AVS SNAPSHOT
[facility]  [facility] [address] [address]  Phone:  [phone]                                    After Visit Summary   11/4/2019    Zuleyma Reed    MRN: [id number]           After Visit Summary Signature Page    I have received my discharge instructions, and my questions have been answered. I have discussed any challenges I see with this plan with the nurse or doctor.    ..........................................................................................................................................  Patient/Patient Representative Signature      ..........................................................................................................................................  Patient Representative Print Name and Relationship to Patient    ..................................................               ................................................  Date                                   Time    ..........................................................................................................................................  Reviewed by Signature/Title    ...................................................              ..............................................  Date                                               Time          22EPIC Rev 08/18
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
## 2019-11-06 ENCOUNTER — ANESTHESIA (OUTPATIENT)
Dept: BEHAVIORAL HEALTH | Facility: CLINIC | Age: 49
End: 2019-11-06

## 2019-11-06 ENCOUNTER — HOSPITAL ENCOUNTER (OUTPATIENT)
Dept: BEHAVIORAL HEALTH | Facility: CLINIC | Age: 49
Discharge: HOME | End: 2019-11-06
Attending: PSYCHIATRY & NEUROLOGY | Admitting: PSYCHIATRY & NEUROLOGY
Payer: COMMERCIAL

## 2019-11-06 ENCOUNTER — ANESTHESIA EVENT (OUTPATIENT)
Dept: BEHAVIORAL HEALTH | Facility: CLINIC | Age: 49
End: 2019-11-06

## 2019-11-06 VITALS
DIASTOLIC BLOOD PRESSURE: 90 MMHG | RESPIRATION RATE: 15 BRPM | OXYGEN SATURATION: 95 % | SYSTOLIC BLOOD PRESSURE: 129 MMHG | HEART RATE: 80 BPM | TEMPERATURE: 98.1 F

## 2019-11-06 DIAGNOSIS — F33.2 SEVERE EPISODE OF RECURRENT MAJOR DEPRESSIVE DISORDER, WITHOUT PSYCHOTIC FEATURES (H): Primary | ICD-10-CM

## 2019-11-06 PROCEDURE — 90870 ELECTROCONVULSIVE THERAPY: CPT

## 2019-11-06 PROCEDURE — 25000128 H RX IP 250 OP 636: Performed by: PSYCHIATRY & NEUROLOGY

## 2019-11-06 PROCEDURE — 37000008 ZZH ANESTHESIA TECHNICAL FEE, 1ST 30 MIN

## 2019-11-06 PROCEDURE — 25000125 ZZHC RX 250: Performed by: ANESTHESIOLOGY

## 2019-11-06 PROCEDURE — 25000128 H RX IP 250 OP 636: Performed by: ANESTHESIOLOGY

## 2019-11-06 RX ORDER — KETOROLAC TROMETHAMINE 30 MG/ML
15 INJECTION, SOLUTION INTRAMUSCULAR; INTRAVENOUS
Status: CANCELLED
Start: 2019-11-06

## 2019-11-06 RX ORDER — ONDANSETRON 4 MG/1
4 TABLET, ORALLY DISINTEGRATING ORAL EVERY 30 MIN PRN
Status: DISCONTINUED | OUTPATIENT
Start: 2019-11-06 | End: 2019-11-07 | Stop reason: HOSPADM

## 2019-11-06 RX ORDER — ONDANSETRON 2 MG/ML
4 INJECTION INTRAMUSCULAR; INTRAVENOUS EVERY 30 MIN PRN
Status: DISCONTINUED | OUTPATIENT
Start: 2019-11-06 | End: 2019-11-07 | Stop reason: HOSPADM

## 2019-11-06 RX ORDER — KETOROLAC TROMETHAMINE 30 MG/ML
30 INJECTION, SOLUTION INTRAMUSCULAR; INTRAVENOUS
Status: CANCELLED
Start: 2019-11-06

## 2019-11-06 RX ORDER — SODIUM CHLORIDE, SODIUM LACTATE, POTASSIUM CHLORIDE, CALCIUM CHLORIDE 600; 310; 30; 20 MG/100ML; MG/100ML; MG/100ML; MG/100ML
INJECTION, SOLUTION INTRAVENOUS CONTINUOUS
Status: DISCONTINUED | OUTPATIENT
Start: 2019-11-06 | End: 2019-11-07 | Stop reason: HOSPADM

## 2019-11-06 RX ORDER — LABETALOL 20 MG/4 ML (5 MG/ML) INTRAVENOUS SYRINGE
10
Status: DISCONTINUED | OUTPATIENT
Start: 2019-11-06 | End: 2019-11-07 | Stop reason: HOSPADM

## 2019-11-06 RX ORDER — KETOROLAC TROMETHAMINE 30 MG/ML
15 INJECTION, SOLUTION INTRAMUSCULAR; INTRAVENOUS
Status: COMPLETED | OUTPATIENT
Start: 2019-11-06 | End: 2019-11-06

## 2019-11-06 RX ORDER — FENTANYL CITRATE 50 UG/ML
25-50 INJECTION, SOLUTION INTRAMUSCULAR; INTRAVENOUS
Status: DISCONTINUED | OUTPATIENT
Start: 2019-11-06 | End: 2019-11-07 | Stop reason: HOSPADM

## 2019-11-06 RX ORDER — NALOXONE HYDROCHLORIDE 0.4 MG/ML
.1-.4 INJECTION, SOLUTION INTRAMUSCULAR; INTRAVENOUS; SUBCUTANEOUS
Status: DISCONTINUED | OUTPATIENT
Start: 2019-11-06 | End: 2019-11-07 | Stop reason: HOSPADM

## 2019-11-06 RX ADMIN — Medication 90 MG: at 07:47

## 2019-11-06 RX ADMIN — KETOROLAC TROMETHAMINE 30 MG: 30 INJECTION, SOLUTION INTRAMUSCULAR; INTRAVENOUS at 07:43

## 2019-11-06 RX ADMIN — METHOHEXITAL SODIUM 90 MG: 500 INJECTION, POWDER, LYOPHILIZED, FOR SOLUTION INTRAMUSCULAR; INTRAVENOUS; RECTAL at 07:47

## 2019-11-06 NOTE — DISCHARGE INSTRUCTIONS
ECT Discharge Instructions      During your ECT series:      Do not drive or work heavy equipment until 7 days after your last treatment.    Do not drink alcohol or use street drugs (illicit drugs) while you are having treatments.    Do not make important decisions, including legal decisions.    After each treatment:      Get plenty of rest. A responsible adult must stay with your for at least 6 hours.    Avoid heavy or risky activities for 24 hours.    If you feel light-headed, sit for a few minutes before standing. Have someone help you get up.    If you have nausea (feel sick to your stomach): Drink only clear liquids such as apple juice, ginger ale, broth or 7UP, Be sure to drink plenty of liquids. Move to a normal diet as you feel able.     If you received Toradol, wait 6 hours before taking ibuprofen.    Call your doctor if:     You have a fever over 100F (37.7 C) (taken under the tongue), or a fever that last more than 24 hours.    Your IV site is red, swollen, very painful or is getting more tender.    You have nausea that gets very bad or does not improve.      If you have any symptoms after ECT, tell our staff before your next treatment.    The ECT Department can be reached at [phone].  The ECT Department is open Mondays, Wednesdays and Fridays from 7:00 AM to 2:00 PM.      To speak to a doctor, call: Dr. Newton's clinic#[phone] office #[phone] Fax #[phone]    Other: Keep up on the journaling and attempt to talk with family more often.     Transported by:  Nicholas Gonzalez

## 2019-11-06 NOTE — ANESTHESIA PREPROCEDURE EVALUATION
Anesthesia Pre-Procedure Evaluation    Patient: Zuleyma Reed   MRN:     [id number] Gender:   female   Age:    49 year old :      1970        Preoperative Diagnosis: * No pre-op diagnosis entered *   * No procedures listed *     Past Medical History:   Diagnosis Date     Depression with anxiety       Past Surgical History:   Procedure Laterality Date     ORTHOPEDIC SURGERY                     PHYSICAL EXAM:   Mental Status/Neuro: A/A/O   Airway: Facies: Feasible  Mallampati: II  Mouth/Opening: Full  TM distance: > 6 cm  Neck ROM: Full   Respiratory: Auscultation: CTAB     Resp. Rate: Normal     Resp. Effort: Normal      CV: Rhythm: Regular  Rate: Age appropriate  Heart: Normal Sounds  Edema: None   Comments:      Dental: Normal Dentition                LABS:  CBC:   Lab Results   Component Value Date    WBC 9.3 10/23/2019    WBC 8.2 2019    HGB 15.0 10/23/2019    HGB 15.7 2019    HCT 45.1 10/23/2019    HCT 46.4 2019     10/23/2019     2019     BMP:   Lab Results   Component Value Date     10/23/2019     2019    POTASSIUM 4.1 10/23/2019    POTASSIUM 4.1 2019    CHLORIDE 107 10/23/2019    CHLORIDE 109 2019    CO2 27 10/23/2019    CO2 28 2019    BUN 11 10/23/2019    BUN 10 2019    CR 0.85 10/23/2019    CR 0.92 2019    GLC 96 10/23/2019     (H) 2019     COAGS: No results found for: PTT, INR, FIBR  POC:   Lab Results   Component Value Date    HCG Negative 10/24/2019     OTHER:   Lab Results   Component Value Date    SHANA 8.5 10/23/2019    MAG 2.2 2006    ALBUMIN 3.6 10/23/2019    PROTTOTAL 7.1 10/23/2019    ALT 20 10/23/2019    AST 8 10/23/2019    ALKPHOS 61 10/23/2019    BILITOTAL 0.9 10/23/2019    LIPASE 65 2006    TSH 3.34 10/23/2019        Preop Vitals    BP Readings from Last 3 Encounters:   19 136/73   19 130/72   19 (!) 135/92    Pulse Readings from Last 3 Encounters:   19  "80   11/04/19 97   11/01/19 91      Resp Readings from Last 3 Encounters:   11/06/19 16   11/04/19 16   11/01/19 16    SpO2 Readings from Last 3 Encounters:   11/06/19 98%   11/04/19 96%   11/01/19 97%      Temp Readings from Last 1 Encounters:   11/06/19 36.4  C (97.5  F) (Tympanic)    Ht Readings from Last 1 Encounters:   10/22/19 1.74 m (5' 8.5\")      Wt Readings from Last 1 Encounters:   10/31/19 90.8 kg (200 lb 1.6 oz)    Estimated body mass index is 29.98 kg/m  as calculated from the following:    Height as of 10/22/19: 1.74 m (5' 8.5\").    Weight as of 10/31/19: 90.8 kg (200 lb 1.6 oz).     LDA:  Peripheral IV 11/06/19 Right Hand (Active)   Number of days: 0        Assessment:   ASA SCORE: 2            Plan:   Anes. Type:  General   Pre-Medication: None   Induction:  IV (Standard)   Airway: Mask   Access/Monitoring: PIV   Maintenance: N/a     Postop Plan:   Postop Pain: Opioids  Postop Sedation/Airway: Not planned     PONV Management:   Adult Risk Factors: Female, Postop Opioids                   Raymond Webster DO  "

## 2019-11-06 NOTE — IP AVS SNAPSHOT
[facility]  [facility] [address] [address]  Phone:  [phone]                                    After Visit Summary   11/6/2019    Zuleyma Reed    MRN: [id number]           After Visit Summary Signature Page    I have received my discharge instructions, and my questions have been answered. I have discussed any challenges I see with this plan with the nurse or doctor.    ..........................................................................................................................................  Patient/Patient Representative Signature      ..........................................................................................................................................  Patient Representative Print Name and Relationship to Patient    ..................................................               ................................................  Date                                   Time    ..........................................................................................................................................  Reviewed by Signature/Title    ...................................................              ..............................................  Date                                               Time          22EPIC Rev 08/18

## 2019-11-06 NOTE — PROGRESS NOTES
Patient meets recovery room discharge criteria. Pt discharged from recovery room via wheelchair to discharge room at  0834.
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
## 2019-11-06 NOTE — ANESTHESIA POSTPROCEDURE EVALUATION
Anesthesia POST Procedure Evaluation    Patient: Zuleyma Reed   MRN:     [id number] Gender:   female   Age:    49 year old :      1970        Preoperative Diagnosis: * No pre-op diagnosis entered *   * No procedures listed *   Postop Comments: No value filed.       Anesthesia Type:  Not documented  No value filed.    Reportable Event: NO     PAIN: Uncomplicated   Sign Out status: Comfortable, Well controlled pain     PONV: No PONV   Sign Out status:  No Nausea or Vomiting     Neuro/Psych: Uneventful perioperative course   Sign Out Status: Preoperative baseline; Age appropriate mentation     Airway/Resp.: Uneventful perioperative course   Sign Out Status: Non labored breathing, age appropriate RR; Resp. Status within EXPECTED Parameters     CV: Uneventful perioperative course   Sign Out status: Appropriate BP and perfusion indices; Appropriate HR/Rhythm     Disposition:   Sign Out in:  PACU  Disposition:  Phase II; Home  Recovery Course: Uneventful  Follow-Up: Not required           Last Anesthesia Record Vitals:  CRNA VITALS  2019 0724 - 2019 0756      2019             Pulse:  88    Ht Rate:  87    SpO2:  100 %    Resp Rate (set):  8          Last PACU Vitals:  Vitals Value Taken Time   BP     Temp     Pulse     Resp     SpO2     Temp src     NIBP 172/95 2019  7:53 AM   Pulse 88 2019  7:55 AM   SpO2 100 % 2019  7:55 AM   Resp     Temp     Ht Rate 87 2019  7:55 AM   Temp 2           Electronically Signed By: Raymond Webster DO, 2019, 7:56 AM

## 2019-11-08 ENCOUNTER — ANESTHESIA EVENT (OUTPATIENT)
Dept: BEHAVIORAL HEALTH | Facility: CLINIC | Age: 49
End: 2019-11-08

## 2019-11-08 ENCOUNTER — ANESTHESIA (OUTPATIENT)
Dept: BEHAVIORAL HEALTH | Facility: CLINIC | Age: 49
End: 2019-11-08

## 2019-11-08 ENCOUNTER — HOSPITAL ENCOUNTER (OUTPATIENT)
Dept: BEHAVIORAL HEALTH | Facility: CLINIC | Age: 49
Discharge: HOME | End: 2019-11-08
Attending: PSYCHIATRY & NEUROLOGY | Admitting: PSYCHIATRY & NEUROLOGY
Payer: COMMERCIAL

## 2019-11-08 VITALS
TEMPERATURE: 98.1 F | RESPIRATION RATE: 16 BRPM | OXYGEN SATURATION: 98 % | DIASTOLIC BLOOD PRESSURE: 91 MMHG | SYSTOLIC BLOOD PRESSURE: 141 MMHG

## 2019-11-08 DIAGNOSIS — F33.2 SEVERE EPISODE OF RECURRENT MAJOR DEPRESSIVE DISORDER, WITHOUT PSYCHOTIC FEATURES (H): Primary | ICD-10-CM

## 2019-11-08 PROCEDURE — 25000125 ZZHC RX 250: Performed by: ANESTHESIOLOGY

## 2019-11-08 PROCEDURE — 37000008 ZZH ANESTHESIA TECHNICAL FEE, 1ST 30 MIN

## 2019-11-08 PROCEDURE — 90870 ELECTROCONVULSIVE THERAPY: CPT

## 2019-11-08 PROCEDURE — 25000128 H RX IP 250 OP 636: Performed by: ANESTHESIOLOGY

## 2019-11-08 PROCEDURE — 25000128 H RX IP 250 OP 636: Performed by: PSYCHIATRY & NEUROLOGY

## 2019-11-08 RX ORDER — NALOXONE HYDROCHLORIDE 0.4 MG/ML
.1-.4 INJECTION, SOLUTION INTRAMUSCULAR; INTRAVENOUS; SUBCUTANEOUS
Status: DISCONTINUED | OUTPATIENT
Start: 2019-11-08 | End: 2019-11-08 | Stop reason: CLARIF

## 2019-11-08 RX ORDER — SODIUM CHLORIDE, SODIUM LACTATE, POTASSIUM CHLORIDE, CALCIUM CHLORIDE 600; 310; 30; 20 MG/100ML; MG/100ML; MG/100ML; MG/100ML
INJECTION, SOLUTION INTRAVENOUS CONTINUOUS
Status: DISCONTINUED | OUTPATIENT
Start: 2019-11-08 | End: 2019-11-08 | Stop reason: CLARIF

## 2019-11-08 RX ORDER — KETOROLAC TROMETHAMINE 30 MG/ML
30 INJECTION, SOLUTION INTRAMUSCULAR; INTRAVENOUS
Status: CANCELLED
Start: 2019-11-08

## 2019-11-08 RX ORDER — LABETALOL 20 MG/4 ML (5 MG/ML) INTRAVENOUS SYRINGE
10
Status: DISCONTINUED | OUTPATIENT
Start: 2019-11-08 | End: 2019-11-09 | Stop reason: HOSPADM

## 2019-11-08 RX ORDER — ONDANSETRON 4 MG/1
4 TABLET, ORALLY DISINTEGRATING ORAL EVERY 30 MIN PRN
Status: DISCONTINUED | OUTPATIENT
Start: 2019-11-08 | End: 2019-11-09 | Stop reason: HOSPADM

## 2019-11-08 RX ORDER — KETOROLAC TROMETHAMINE 30 MG/ML
30 INJECTION, SOLUTION INTRAMUSCULAR; INTRAVENOUS
Status: COMPLETED | OUTPATIENT
Start: 2019-11-08 | End: 2019-11-08

## 2019-11-08 RX ORDER — FENTANYL CITRATE 50 UG/ML
25-50 INJECTION, SOLUTION INTRAMUSCULAR; INTRAVENOUS
Status: DISCONTINUED | OUTPATIENT
Start: 2019-11-08 | End: 2019-11-09 | Stop reason: HOSPADM

## 2019-11-08 RX ORDER — ONDANSETRON 2 MG/ML
4 INJECTION INTRAMUSCULAR; INTRAVENOUS EVERY 30 MIN PRN
Status: DISCONTINUED | OUTPATIENT
Start: 2019-11-08 | End: 2019-11-09 | Stop reason: HOSPADM

## 2019-11-08 RX ADMIN — METHOHEXITAL SODIUM 90 MG: 500 INJECTION, POWDER, LYOPHILIZED, FOR SOLUTION INTRAMUSCULAR; INTRAVENOUS; RECTAL at 11:52

## 2019-11-08 RX ADMIN — KETOROLAC TROMETHAMINE 30 MG: 30 INJECTION, SOLUTION INTRAMUSCULAR at 11:40

## 2019-11-08 RX ADMIN — Medication 90 MG: at 11:52

## 2019-11-08 NOTE — IP AVS SNAPSHOT
MRN:[id number]                      After Visit Summary   11/8/2019    Zuleyma Reed    MRN: [id number]           Visit Information        Provider Department      11/8/2019 10:00 AM Dmitry Newton MD [facility]           Review of your medicines      CONTINUE these medicines which have NOT CHANGED       Dose / Directions   acetaminophen 325 MG tablet  Commonly known as:  TYLENOL      Dose:  975 mg  Take 975 mg by mouth every 6 hours as needed for mild pain  Refills:  0     aspirin-acetaminophen-caffeine 250-250-65 MG tablet  Commonly known as:  EXCEDRIN MIGRAINE      Dose:  1 tablet  Take 1 tablet by mouth every 6 hours as needed for headaches  Refills:  0     cyclobenzaprine 5 MG tablet  Commonly known as:  FLEXERIL  Used for:  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      Dose:  5-10 mg  Take 1-2 tablets (5-10 mg) by mouth 3 times daily as needed for muscle spasms (severe pain)  Quantity:  90 tablet  Refills:  0     LORazepam 0.5 MG tablet  Commonly known as:  ATIVAN  Used for:  Generalized anxiety disorder      Dose:  0.5 mg  Take 1 tablet (0.5 mg) by mouth every 6 hours as needed for anxiety  Quantity:  10 tablet  Refills:  0     nicotine 2 MG lozenge  Commonly known as:  COMMIT  Used for:  Tobacco dependence syndrome      Dose:  2-4 mg  Place 1-2 lozenges (2-4 mg) inside cheek every hour as needed for other (nicotine withdrawal symptoms)  Quantity:  90 lozenge  Refills:  0     sodium chloride 0.65 % nasal spray  Commonly known as:  OCEAN  Used for:  Seasonal allergic rhinitis due to pollen      Dose:  1 spray  Spray 1 spray into both nostrils every hour as needed for congestion  Quantity:  1 Bottle  Refills:  0     SUMAtriptan 25 MG tablet  Commonly known as:  IMITREX  Used for:  Acute non intractable tension-type headache      Dose:  25 mg  Take 1 tablet (25 mg) by mouth at onset of headache for migraine or other (post ECT headache) May repeat in 2  hours. Max 8 tablets/24 hours.  Quantity:  10 tablet  Refills:  0     traZODone 50 MG tablet  Commonly known as:  DESYREL  Used for:  Severe episode of recurrent major depressive disorder, without psychotic features (H)      Dose:  50 mg  Take 1 tablet (50 mg) by mouth nightly as needed for sleep  Quantity:  30 tablet  Refills:  0     vilazodone 10 MG Tabs tablet  Commonly known as:  VIIBRYD  Used for:  Severe episode of recurrent major depressive disorder, without psychotic features (H)      Dose:  20 mg  Take 2 tablets (20 mg) by mouth At Bedtime  Quantity:  60 tablet  Refills:  0              Protect others around you: Learn how to safely use, store and throw away your medicines at www.disposemymeds.org.       Follow-ups after your visit       Your next 10 appointments already scheduled    Nov 11, 2019 10:30 AM CST  Electroconvulsive Therapy with Dmitry Newton MD  [facility] ([facility]) [address] [place] [facility] [address]  [phone]      Nov 21, 2019 11:30 AM CST  Adult Med Follow UP with BERTRAM Lorenzo [facility]  [facility] ([facility]) [address]  [address] [place] [id number] [phone]         Care Instructions       Further instructions from your care team       ECT Discharge Instructions      During your ECT series:      Do not drive or work heavy equipment until 7 days after your last treatment.    Do not drink alcohol or use street drugs (illicit drugs) while you are having treatments.    Do not make important decisions, including legal decisions.    After each treatment:      Get plenty of rest. A responsible adult must stay with your for at least 6 hours.    Do not drive for 24 hours after ECT treatment. If you have more than one treatment within a week, no driving until 7 days after your last ECT treatment.    Avoid heavy or risky activities for 24 hours.    If  you feel light-headed, sit for a few minutes before standing. Have someone help you get up.    If you have nausea (feel sick to your stomach): Drink only clear liquids such as apple juice, ginger ale, broth or 7UP, Be sure to drink plenty of liquids. Move to a normal diet as you feel able.     If you received Toradol, wait 6 hours before taking ibuprofen.    Call your doctor if:     You have a fever over 100F (37.7 C) (taken under the tongue), or a fever that last more than 24 hours.    Your IV site is red, swollen, very painful or is getting more tender.    You have nausea that gets very bad or does not improve.      If you have any symptoms after ECT, tell our staff before your next treatment.    The ECT Department can be reached at [phone].  The ECT Department is open Mondays, Wednesdays and Fridays from 7:00 AM to 2:00 PM.     To speak to a doctor, call: Dr Newton [phone]    Other: You have received Toradol today at 11:40 AM . Toradol is an NSAID.  Do not take any NSAID's including: Ibuprofen, Naproxen Sodium, Asprin , Advil, Motrin, Aleve, Juan Jose, etc., until six hours after the above time which would be 5:40 PM. If you have any questions check back with your Physician, or pharmacist.     Transported by: Nicholas lemus [phone]      _________________________________________________  ________________________  Patient/Responsible party Signature      Date          ________________________________________________   ________________________  Nurse Signature        Date    Additional Information About Your Visit       [employer] Information    [employer] gives you secure access to your electronic health record. If you see a primary care provider, you can also send messages to your care team and make appointments. If you have questions, please call your primary care clinic.  If you do not have a primary care provider, please call [phone] and they will assist you.       Care EveryWhere ID    This is  your Care EveryWhere ID. This could be used by other organizations to access your [facility] medical records  [id number]       Your Vitals Were  Most recent update: 11/8/2019 12:11 PM    Blood Pressure   157/95      Temperature   98.1  F (36.7  C) (Temporal)    Respirations   16    Pulse Oximetry   97%           Primary Care Provider Office Phone # Fax #    Hannah Parker, APRN [facility] [phone]      Equal Access to Services    STACEY MARTINEZ : Hadii aad ku hadasho Soomaali, waaxda luqadaha, qaybta kaalmada adeegyada, waxay idiin hayaan adeeg khciarapaula la'farhana . So [facility] [phone].    ATENCIÓN: Si habla español, tiene a jeff disposición servicios gratuitos de asistencia lingüística. [facility] [phone].    We comply with applicable federal civil rights laws and [place] laws. We do not discriminate on the basis of race, color, national origin, age, disability, sex, sexual orientation, or gender identity.           Thank you!    Thank you for choosing [facility] for your care. Our goal is always to provide you with excellent care. Hearing back from our patients is one way we can continue to improve our services. Please take a few minutes to complete the written survey that you may receive in the mail after you visit with us. Thank you!            Medication List      Medications       Morning Afternoon Evening Bedtime As Needed   acetaminophen 325 MG tablet  Also known as:  TYLENOL  INSTRUCTIONS:  Take 975 mg by mouth every 6 hours as needed for mild pain                    aspirin-acetaminophen-caffeine 250-250-65 MG tablet  Also known as:  EXCEDRIN MIGRAINE  INSTRUCTIONS:  Take 1 tablet by mouth every 6 hours as needed for headaches                    cyclobenzaprine 5 MG tablet  Also known as:  FLEXERIL  INSTRUCTIONS:  Take 1-2 tablets (5-10 mg) by mouth 3 times daily as needed for muscle spasms (severe pain)                    LORazepam 0.5 MG tablet  Also known as:  ATIVAN  INSTRUCTIONS:  Take 1  tablet (0.5 mg) by mouth every 6 hours as needed for anxiety                    nicotine 2 MG lozenge  Also known as:  COMMIT  INSTRUCTIONS:  Place 1-2 lozenges (2-4 mg) inside cheek every hour as needed for other (nicotine withdrawal symptoms)                    sodium chloride 0.65 % nasal spray  Also known as:  OCEAN  INSTRUCTIONS:  Spray 1 spray into both nostrils every hour as needed for congestion                    SUMAtriptan 25 MG tablet  Also known as:  IMITREX  INSTRUCTIONS:  Take 1 tablet (25 mg) by mouth at onset of headache for migraine or other (post ECT headache) May repeat in 2 hours. Max 8 tablets/24 hours.                    traZODone 50 MG tablet  Also known as:  DESYREL  INSTRUCTIONS:  Take 1 tablet (50 mg) by mouth nightly as needed for sleep                    vilazodone 10 MG Tabs tablet  Also known as:  VIIBRYD  INSTRUCTIONS:  Take 2 tablets (20 mg) by mouth At Bedtime

## 2019-11-08 NOTE — TELEPHONE ENCOUNTER
Forms not picked up from the front from 10/9/19. Will mail copy of forms to patient's home address.    MICHAEL Andrews

## 2019-11-08 NOTE — ANESTHESIA POSTPROCEDURE EVALUATION
Anesthesia POST Procedure Evaluation    Patient: Zuleyma Reed   MRN:     [id number] Gender:   female   Age:    49 year old :      1970        Preoperative Diagnosis: * No pre-op diagnosis entered *   * No procedures listed *   Postop Comments: No value filed.       Anesthesia Type:  Not documented  No value filed.    Reportable Event: NO     PAIN: Uncomplicated   Sign Out status: Comfortable, Well controlled pain     PONV: No PONV   Sign Out status:  No Nausea or Vomiting     Neuro/Psych: Uneventful perioperative course   Sign Out Status: Preoperative baseline; Age appropriate mentation     Airway/Resp.: Uneventful perioperative course   Sign Out Status: Non labored breathing, age appropriate RR; Resp. Status within EXPECTED Parameters     CV: Uneventful perioperative course   Sign Out status: Appropriate BP and perfusion indices; Appropriate HR/Rhythm     Disposition:   Sign Out in:  PACU  Disposition:  Phase II; Home  Recovery Course: Uneventful  Follow-Up: Not required           Last Anesthesia Record Vitals:  CRNA VITALS  2019 1126 - 2019 1159      2019             Pulse:  96    Ht Rate:  96    SpO2:  100 %    Resp Rate (set):  8          Last PACU Vitals:  Vitals Value Taken Time   BP     Temp     Pulse     Resp     SpO2     Temp src     NIBP 187/116 2019 11:54 AM   Pulse 96 2019 11:57 AM   SpO2 100 % 2019 11:57 AM   Resp     Temp     Ht Rate 96 2019 11:57 AM   Temp 2           Electronically Signed By: Raymond Webster DO, 2019, 11:59 AM
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
## 2019-11-08 NOTE — IP AVS SNAPSHOT
[facility]  [facility] [address] [address]  Phone:  [phone]                                    After Visit Summary   11/8/2019    Zuleyma Reed    MRN: [id number]           After Visit Summary Signature Page    I have received my discharge instructions, and my questions have been answered. I have discussed any challenges I see with this plan with the nurse or doctor.    ..........................................................................................................................................  Patient/Patient Representative Signature      ..........................................................................................................................................  Patient Representative Print Name and Relationship to Patient    ..................................................               ................................................  Date                                   Time    ..........................................................................................................................................  Reviewed by Signature/Title    ...................................................              ..............................................  Date                                               Time          22EPIC Rev 08/18

## 2019-11-08 NOTE — ANESTHESIA PREPROCEDURE EVALUATION
Anesthesia Pre-Procedure Evaluation    Patient: Zuleyma Reed   MRN:     [id number] Gender:   female   Age:    49 year old :      1970        Preoperative Diagnosis: * No pre-op diagnosis entered *   * No procedures listed *     Past Medical History:   Diagnosis Date     Depression with anxiety       Past Surgical History:   Procedure Laterality Date     ORTHOPEDIC SURGERY                     PHYSICAL EXAM:   Mental Status/Neuro: A/A/O   Airway: Facies: Feasible  Mallampati: II  Mouth/Opening: Full  TM distance: > 6 cm  Neck ROM: Full   Respiratory: Auscultation: CTAB     Resp. Rate: Normal     Resp. Effort: Normal      CV: Rhythm: Regular  Rate: Age appropriate  Heart: Normal Sounds  Edema: None   Comments:      Dental: Normal Dentition                LABS:  CBC:   Lab Results   Component Value Date    WBC 9.3 10/23/2019    WBC 8.2 2019    HGB 15.0 10/23/2019    HGB 15.7 2019    HCT 45.1 10/23/2019    HCT 46.4 2019     10/23/2019     2019     BMP:   Lab Results   Component Value Date     10/23/2019     2019    POTASSIUM 4.1 10/23/2019    POTASSIUM 4.1 2019    CHLORIDE 107 10/23/2019    CHLORIDE 109 2019    CO2 27 10/23/2019    CO2 28 2019    BUN 11 10/23/2019    BUN 10 2019    CR 0.85 10/23/2019    CR 0.92 2019    GLC 96 10/23/2019     (H) 2019     COAGS: No results found for: PTT, INR, FIBR  POC:   Lab Results   Component Value Date    HCG Negative 10/24/2019     OTHER:   Lab Results   Component Value Date    SHANA 8.5 10/23/2019    MAG 2.2 2006    ALBUMIN 3.6 10/23/2019    PROTTOTAL 7.1 10/23/2019    ALT 20 10/23/2019    AST 8 10/23/2019    ALKPHOS 61 10/23/2019    BILITOTAL 0.9 10/23/2019    LIPASE 65 2006    TSH 3.34 10/23/2019        Preop Vitals    BP Readings from Last 3 Encounters:   19 (!) 129/90   19 130/72   11/01/19 (!) 135/92    Pulse Readings from Last 3 Encounters:  "  11/06/19 80   11/04/19 97   11/01/19 91      Resp Readings from Last 3 Encounters:   11/06/19 15   11/04/19 16   11/01/19 16    SpO2 Readings from Last 3 Encounters:   11/06/19 95%   11/04/19 96%   11/01/19 97%      Temp Readings from Last 1 Encounters:   11/06/19 36.7  C (98.1  F) (Temporal)    Ht Readings from Last 1 Encounters:   10/22/19 1.74 m (5' 8.5\")      Wt Readings from Last 1 Encounters:   10/31/19 90.8 kg (200 lb 1.6 oz)    Estimated body mass index is 29.98 kg/m  as calculated from the following:    Height as of 10/22/19: 1.74 m (5' 8.5\").    Weight as of 10/31/19: 90.8 kg (200 lb 1.6 oz).     LDA:  Peripheral IV 11/08/19 Left Hand (Active)   Number of days: 0        Assessment:   ASA SCORE: 3            Plan:   Anes. Type:  General   Pre-Medication: None   Induction:  IV (Standard)   Airway: Mask   Access/Monitoring: PIV   Maintenance: N/a     Postop Plan:   Postop Pain: Opioids  Postop Sedation/Airway: Not planned     PONV Management:   Adult Risk Factors: Female, Postop Opioids                   Raymond Webster DO  "

## 2019-11-08 NOTE — DISCHARGE INSTRUCTIONS
ECT Discharge Instructions      During your ECT series:      Do not drive or work heavy equipment until 7 days after your last treatment.    Do not drink alcohol or use street drugs (illicit drugs) while you are having treatments.    Do not make important decisions, including legal decisions.    After each treatment:      Get plenty of rest. A responsible adult must stay with your for at least 6 hours.    Do not drive for 24 hours after ECT treatment. If you have more than one treatment within a week, no driving until 7 days after your last ECT treatment.    Avoid heavy or risky activities for 24 hours.    If you feel light-headed, sit for a few minutes before standing. Have someone help you get up.    If you have nausea (feel sick to your stomach): Drink only clear liquids such as apple juice, ginger ale, broth or 7UP, Be sure to drink plenty of liquids. Move to a normal diet as you feel able.     If you received Toradol, wait 6 hours before taking ibuprofen.    Call your doctor if:     You have a fever over 100F (37.7 C) (taken under the tongue), or a fever that last more than 24 hours.    Your IV site is red, swollen, very painful or is getting more tender.    You have nausea that gets very bad or does not improve.      If you have any symptoms after ECT, tell our staff before your next treatment.    The ECT Department can be reached at [phone].  The ECT Department is open Mondays, Wednesdays and Fridays from 7:00 AM to 2:00 PM.     To speak to a doctor, call: Dr Newton [phone]    Other: You have received Toradol today at 11:40 AM . Toradol is an NSAID.  Do not take any NSAID's including: Ibuprofen, Naproxen Sodium, Asprin , Advil, Motrin, Aleve, Juan Jose, etc., until six hours after the above time which would be 5:40 PM. If you have any questions check back with your Physician, or pharmacist.     Transported by: yesi Nicholas fernández  [phone]      _________________________________________________  ________________________  Patient/Responsible party Signature      Date          ________________________________________________   ________________________  Nurse Signature        Date

## 2019-11-11 ENCOUNTER — HOSPITAL ENCOUNTER (OUTPATIENT)
Dept: BEHAVIORAL HEALTH | Facility: CLINIC | Age: 49
Discharge: HOME | End: 2019-11-11
Attending: PSYCHIATRY & NEUROLOGY | Admitting: PSYCHIATRY & NEUROLOGY
Payer: COMMERCIAL

## 2019-11-11 ENCOUNTER — ANESTHESIA EVENT (OUTPATIENT)
Dept: BEHAVIORAL HEALTH | Facility: CLINIC | Age: 49
End: 2019-11-11

## 2019-11-11 ENCOUNTER — ANESTHESIA (OUTPATIENT)
Dept: BEHAVIORAL HEALTH | Facility: CLINIC | Age: 49
End: 2019-11-11

## 2019-11-11 VITALS
TEMPERATURE: 98.6 F | SYSTOLIC BLOOD PRESSURE: 141 MMHG | RESPIRATION RATE: 16 BRPM | OXYGEN SATURATION: 95 % | DIASTOLIC BLOOD PRESSURE: 83 MMHG

## 2019-11-11 DIAGNOSIS — F33.2 SEVERE EPISODE OF RECURRENT MAJOR DEPRESSIVE DISORDER, WITHOUT PSYCHOTIC FEATURES (H): Primary | ICD-10-CM

## 2019-11-11 PROCEDURE — 90870 ELECTROCONVULSIVE THERAPY: CPT

## 2019-11-11 PROCEDURE — 25000125 ZZHC RX 250: Performed by: ANESTHESIOLOGY

## 2019-11-11 PROCEDURE — 25000128 H RX IP 250 OP 636: Performed by: ANESTHESIOLOGY

## 2019-11-11 PROCEDURE — 37000008 ZZH ANESTHESIA TECHNICAL FEE, 1ST 30 MIN

## 2019-11-11 PROCEDURE — 25000128 H RX IP 250 OP 636: Performed by: PSYCHIATRY & NEUROLOGY

## 2019-11-11 RX ORDER — ONDANSETRON 4 MG/1
4 TABLET, ORALLY DISINTEGRATING ORAL EVERY 30 MIN PRN
Status: DISCONTINUED | OUTPATIENT
Start: 2019-11-11 | End: 2019-11-12 | Stop reason: HOSPADM

## 2019-11-11 RX ORDER — KETOROLAC TROMETHAMINE 30 MG/ML
30 INJECTION, SOLUTION INTRAMUSCULAR; INTRAVENOUS
Status: CANCELLED
Start: 2019-11-11

## 2019-11-11 RX ORDER — NALOXONE HYDROCHLORIDE 0.4 MG/ML
.1-.4 INJECTION, SOLUTION INTRAMUSCULAR; INTRAVENOUS; SUBCUTANEOUS
Status: DISCONTINUED | OUTPATIENT
Start: 2019-11-11 | End: 2019-11-12 | Stop reason: HOSPADM

## 2019-11-11 RX ORDER — KETOROLAC TROMETHAMINE 30 MG/ML
30 INJECTION, SOLUTION INTRAMUSCULAR; INTRAVENOUS
Status: COMPLETED | OUTPATIENT
Start: 2019-11-11 | End: 2019-11-11

## 2019-11-11 RX ORDER — SODIUM CHLORIDE, SODIUM LACTATE, POTASSIUM CHLORIDE, CALCIUM CHLORIDE 600; 310; 30; 20 MG/100ML; MG/100ML; MG/100ML; MG/100ML
INJECTION, SOLUTION INTRAVENOUS CONTINUOUS
Status: DISCONTINUED | OUTPATIENT
Start: 2019-11-11 | End: 2019-11-12 | Stop reason: HOSPADM

## 2019-11-11 RX ORDER — ONDANSETRON 2 MG/ML
4 INJECTION INTRAMUSCULAR; INTRAVENOUS EVERY 30 MIN PRN
Status: DISCONTINUED | OUTPATIENT
Start: 2019-11-11 | End: 2019-11-12 | Stop reason: HOSPADM

## 2019-11-11 RX ORDER — FENTANYL CITRATE 50 UG/ML
25-50 INJECTION, SOLUTION INTRAMUSCULAR; INTRAVENOUS
Status: DISCONTINUED | OUTPATIENT
Start: 2019-11-11 | End: 2019-11-12 | Stop reason: HOSPADM

## 2019-11-11 RX ADMIN — Medication 90 MG: at 11:50

## 2019-11-11 RX ADMIN — KETOROLAC TROMETHAMINE 30 MG: 30 INJECTION, SOLUTION INTRAMUSCULAR; INTRAVENOUS at 11:32

## 2019-11-11 RX ADMIN — METHOHEXITAL SODIUM 90 MG: 500 INJECTION, POWDER, LYOPHILIZED, FOR SOLUTION INTRAMUSCULAR; INTRAVENOUS; RECTAL at 11:49

## 2019-11-11 NOTE — IP AVS SNAPSHOT
[facility]  [facility] [address] [address]  Phone:  [phone]                                    After Visit Summary   11/11/2019    Zuleyma Reed    MRN: [id number]           After Visit Summary Signature Page    I have received my discharge instructions, and my questions have been answered. I have discussed any challenges I see with this plan with the nurse or doctor.    ..........................................................................................................................................  Patient/Patient Representative Signature      ..........................................................................................................................................  Patient Representative Print Name and Relationship to Patient    ..................................................               ................................................  Date                                   Time    ..........................................................................................................................................  Reviewed by Signature/Title    ...................................................              ..............................................  Date                                               Time          22EPIC Rev 08/18

## 2019-11-11 NOTE — ANESTHESIA PREPROCEDURE EVALUATION
Anesthesia Pre-Procedure Evaluation    Patient: Zuleyma Reed   MRN:     [id number] Gender:   female   Age:    49 year old :      1970        Preoperative Diagnosis: * No pre-op diagnosis entered *   * No procedures listed *     Past Medical History:   Diagnosis Date     Depression with anxiety       Past Surgical History:   Procedure Laterality Date     ORTHOPEDIC SURGERY                     PHYSICAL EXAM:   Mental Status/Neuro: A/A/O   Airway: Facies: Feasible  Mallampati: II  Mouth/Opening: Full  TM distance: > 6 cm  Neck ROM: Full   Respiratory: Auscultation: CTAB     Resp. Rate: Normal     Resp. Effort: Normal      CV: Rhythm: Regular  Rate: Age appropriate  Heart: Normal Sounds  Edema: None   Comments:      Dental: Normal Dentition                LABS:  CBC:   Lab Results   Component Value Date    WBC 9.3 10/23/2019    WBC 8.2 2019    HGB 15.0 10/23/2019    HGB 15.7 2019    HCT 45.1 10/23/2019    HCT 46.4 2019     10/23/2019     2019     BMP:   Lab Results   Component Value Date     10/23/2019     2019    POTASSIUM 4.1 10/23/2019    POTASSIUM 4.1 2019    CHLORIDE 107 10/23/2019    CHLORIDE 109 2019    CO2 27 10/23/2019    CO2 28 2019    BUN 11 10/23/2019    BUN 10 2019    CR 0.85 10/23/2019    CR 0.92 2019    GLC 96 10/23/2019     (H) 2019     COAGS: No results found for: PTT, INR, FIBR  POC:   Lab Results   Component Value Date    HCG Negative 10/24/2019     OTHER:   Lab Results   Component Value Date    SHANA 8.5 10/23/2019    MAG 2.2 2006    ALBUMIN 3.6 10/23/2019    PROTTOTAL 7.1 10/23/2019    ALT 20 10/23/2019    AST 8 10/23/2019    ALKPHOS 61 10/23/2019    BILITOTAL 0.9 10/23/2019    LIPASE 65 2006    TSH 3.34 10/23/2019        Preop Vitals    BP Readings from Last 3 Encounters:   19 127/83   19 (!) 141/91   11/06/19 (!) 129/90    Pulse Readings from Last 3 Encounters:  "  11/06/19 80   11/04/19 97   11/01/19 91      Resp Readings from Last 3 Encounters:   11/11/19 16   11/08/19 16   11/06/19 15    SpO2 Readings from Last 3 Encounters:   11/11/19 98%   11/08/19 98%   11/06/19 95%      Temp Readings from Last 1 Encounters:   11/11/19 37  C (98.6  F) (Tympanic)    Ht Readings from Last 1 Encounters:   10/22/19 1.74 m (5' 8.5\")      Wt Readings from Last 1 Encounters:   10/31/19 90.8 kg (200 lb 1.6 oz)    Estimated body mass index is 29.98 kg/m  as calculated from the following:    Height as of 10/22/19: 1.74 m (5' 8.5\").    Weight as of 10/31/19: 90.8 kg (200 lb 1.6 oz).     LDA:  Peripheral IV 11/11/19 Left Hand (Active)   Number of days: 0        Assessment:   ASA SCORE: 2            Plan:   Anes. Type:  General   Pre-Medication: None   Induction:  IV (Standard)   Airway: Mask   Access/Monitoring: PIV   Maintenance: N/a     Postop Plan:   Postop Pain: Opioids  Postop Sedation/Airway: Not planned     PONV Management:   Adult Risk Factors: Female, Postop Opioids                   Raymond Webster DO  "
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
## 2019-11-11 NOTE — ANESTHESIA POSTPROCEDURE EVALUATION
Anesthesia POST Procedure Evaluation    Patient: Zuleyma Reed   MRN:     [id number] Gender:   female   Age:    49 year old :      1970        Preoperative Diagnosis: * No pre-op diagnosis entered *   * No procedures listed *   Postop Comments: No value filed.       Anesthesia Type:  Not documented  General    Reportable Event: NO     PAIN: Uncomplicated   Sign Out status: Comfortable, Well controlled pain     PONV: No PONV   Sign Out status:  No Nausea or Vomiting     Neuro/Psych: Uneventful perioperative course   Sign Out Status: Preoperative baseline; Age appropriate mentation     Airway/Resp.: Uneventful perioperative course   Sign Out Status: Non labored breathing, age appropriate RR; Resp. Status within EXPECTED Parameters     CV: Uneventful perioperative course   Sign Out status: Appropriate BP and perfusion indices; Appropriate HR/Rhythm     Disposition:   Sign Out in:  PACU  Disposition:  Phase II; Home  Recovery Course: Uneventful  Follow-Up: Not required           Last Anesthesia Record Vitals:  CRNA VITALS  2019 1130 - 2019 1201      2019             Pulse:  95    Ht Rate:  98    SpO2:  98 %    Resp Rate (set):  8          Last PACU Vitals:  Vitals Value Taken Time   BP     Temp     Pulse     Resp     SpO2     Temp src     NIBP 174/125 2019 11:55 AM   Pulse 95 2019 12:00 PM   SpO2 98 % 2019 12:00 PM   Resp     Temp     Ht Rate 98 2019 12:00 PM   Temp 2           Electronically Signed By: Raymond Webster DO, 2019, 12:01 PM

## 2019-11-11 NOTE — DISCHARGE INSTRUCTIONS
ECT Discharge Instructions      During your ECT series:      Do not drive or work heavy equipment until 7 days after your last treatment.    Do not drink alcohol or use street drugs (illicit drugs) while you are having treatments.    Do not make important decisions, including legal decisions.    After each treatment:      Get plenty of rest. A responsible adult must stay with your for at least 6 hours.    Avoid heavy or risky activities for 24 hours.    If you feel light-headed, sit for a few minutes before standing. Have someone help you get up.    If you have nausea (feel sick to your stomach): Drink only clear liquids such as apple juice, ginger ale, broth or 7UP, Be sure to drink plenty of liquids. Move to a normal diet as you feel able.     If you received Toradol, wait 6 hours before taking ibuprofen.    Call your doctor if:     You have a fever over 100F (37.7 C) (taken under the tongue), or a fever that last more than 24 hours.    Your IV site is red, swollen, very painful or is getting more tender.    You have nausea that gets very bad or does not improve.      If you have any symptoms after ECT, tell our staff before your next treatment.    The ECT Department can be reached at [phone].  The ECT Department is open Mondays, Wednesdays and Fridays from 7:00 AM to 2:00 PM.      To speak to a doctor, call: Dr. Dmitry Newton: Office # [phone] and Fax # [phone]    Other: You had Toradol at 11:30 AM which is an NSAID medication. Do not take any ibuprofen, motrin, aspirin or any other NSAID medication until after 5:30 PM. Questions, check with your physician or pharmacist.    Transported by: Father      _________________________________________________  ________________________  Patient/Responsible party Signature      Date          ________________________________________________   ________________________  Nurse Signature        Date

## 2019-11-11 NOTE — IP AVS SNAPSHOT
MRN:[id number]                      After Visit Summary   11/11/2019    Zuleyma Reed    MRN: [id number]           Visit Information        Provider Department      11/11/2019 10:30 AM Dmitry Newton MD [facility]           Review of your medicines      CONTINUE these medicines which have NOT CHANGED       Dose / Directions   acetaminophen 325 MG tablet  Commonly known as:  TYLENOL      Dose:  975 mg  Take 975 mg by mouth every 6 hours as needed for mild pain  Refills:  0     aspirin-acetaminophen-caffeine 250-250-65 MG tablet  Commonly known as:  EXCEDRIN MIGRAINE      Dose:  1 tablet  Take 1 tablet by mouth every 6 hours as needed for headaches  Refills:  0     cyclobenzaprine 5 MG tablet  Commonly known as:  FLEXERIL  Used for:  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      Dose:  5-10 mg  Take 1-2 tablets (5-10 mg) by mouth 3 times daily as needed for muscle spasms (severe pain)  Quantity:  90 tablet  Refills:  0     LORazepam 0.5 MG tablet  Commonly known as:  ATIVAN  Used for:  Generalized anxiety disorder      Dose:  0.5 mg  Take 1 tablet (0.5 mg) by mouth every 6 hours as needed for anxiety  Quantity:  10 tablet  Refills:  0     sodium chloride 0.65 % nasal spray  Commonly known as:  OCEAN  Used for:  Seasonal allergic rhinitis due to pollen      Dose:  1 spray  Spray 1 spray into both nostrils every hour as needed for congestion  Quantity:  1 Bottle  Refills:  0     SUMAtriptan 25 MG tablet  Commonly known as:  IMITREX  Used for:  Acute non intractable tension-type headache      Dose:  25 mg  Take 1 tablet (25 mg) by mouth at onset of headache for migraine or other (post ECT headache) May repeat in 2 hours. Max 8 tablets/24 hours.  Quantity:  10 tablet  Refills:  0     traZODone 50 MG tablet  Commonly known as:  DESYREL  Used for:  Severe episode of recurrent major depressive disorder, without psychotic features (H)      Dose:  50 mg  Take 1 tablet  (50 mg) by mouth nightly as needed for sleep  Quantity:  30 tablet  Refills:  0     vilazodone 10 MG Tabs tablet  Commonly known as:  VIIBRYD  Used for:  Severe episode of recurrent major depressive disorder, without psychotic features (H)      Dose:  20 mg  Take 2 tablets (20 mg) by mouth At Bedtime  Quantity:  60 tablet  Refills:  0              Protect others around you: Learn how to safely use, store and throw away your medicines at www.disposemymeds.org.       Follow-ups after your visit       Your next 10 appointments already scheduled    Nov 13, 2019  7:15 AM CST  Electroconvulsive Therapy with Dmitry Newton MD  [facility] ([facility]) [address]  [facility] [address]  [phone]      Nov 21, 2019 11:30 AM CST  Adult Med Follow UP with BERTRAM Lorenzo [facility]  Psychiatry Clinic ([facility]) [place] [id number]  [address] [address] [phone]         Care Instructions       Further instructions from your care team       ECT Discharge Instructions      During your ECT series:      Do not drive or work heavy equipment until 7 days after your last treatment.    Do not drink alcohol or use street drugs (illicit drugs) while you are having treatments.    Do not make important decisions, including legal decisions.    After each treatment:      Get plenty of rest. A responsible adult must stay with your for at least 6 hours.    Avoid heavy or risky activities for 24 hours.    If you feel light-headed, sit for a few minutes before standing. Have someone help you get up.    If you have nausea (feel sick to your stomach): Drink only clear liquids such as apple juice, ginger ale, broth or 7UP, Be sure to drink plenty of liquids. Move to a normal diet as you feel able.     If you received Toradol, wait 6 hours before taking ibuprofen.    Call your doctor if:     You have a fever over  100F (37.7 C) (taken under the tongue), or a fever that last more than 24 hours.    Your IV site is red, swollen, very painful or is getting more tender.    You have nausea that gets very bad or does not improve.      If you have any symptoms after ECT, tell our staff before your next treatment.    The ECT Department can be reached at [phone].  The ECT Department is open Mondays, Wednesdays and Fridays from 7:00 AM to 2:00 PM.      To speak to a doctor, call: Dr. Dmitry Newton: Office # [phone] and Fax # [phone]    Other: You had Toradol at 11:30 AM which is an NSAID medication. Do not take any ibuprofen, motrin, aspirin or any other NSAID medication until after 5:30 PM. Questions, check with your physician or pharmacist.    Transported by: Father      _________________________________________________  ________________________  Patient/Responsible party Signature      Date          ________________________________________________   ________________________  Nurse Signature        Date    Additional Information About Your Visit       MyChart Information    [employer] gives you secure access to your electronic health record. If you see a primary care provider, you can also send messages to your care team and make appointments. If you have questions, please call your primary care clinic.  If you do not have a primary care provider, please call [phone] and they will assist you.       Care EveryWhere ID    This is your Care EveryWhere ID. This could be used by other organizations to access your [facility] medical records  [id number]       Your Vitals Were  Most recent update: 11/11/2019 12:04 PM    Blood Pressure   152/92      Temperature   98.6  F (37  C) (Temporal)    Respirations   16    Pulse Oximetry   93%           Primary Care Provider Office Phone # Fax #    Hannah Arias Keith, APRN [facility] [phone]      Equal Access to Services    STACEY CARDENAS: Garrett Hickey,  wajennyda nolbertohina, qaybta kashabbir maki, seema erwinaan ah. So [facility] [place] [facility] [phone].    ATENCIÓN: Si thony vera, tiene a jeff disposición servicios gratuitos de asistencia lingüística. Helen al [phone].    We comply with applicable federal civil rights laws and [place] laws. We do not discriminate on the basis of race, color, national origin, age, disability, sex, sexual orientation, or gender identity.           Thank you!    Thank you for choosing [facility] for your care. Our goal is always to provide you with excellent care. Hearing back from our patients is one way we can continue to improve our services. Please take a few minutes to complete the written survey that you may receive in the mail after you visit with us. Thank you!            Medication List      Medications       Morning Afternoon Evening Bedtime As Needed   acetaminophen 325 MG tablet  Also known as:  TYLENOL  INSTRUCTIONS:  Take 975 mg by mouth every 6 hours as needed for mild pain                    aspirin-acetaminophen-caffeine 250-250-65 MG tablet  Also known as:  EXCEDRIN MIGRAINE  INSTRUCTIONS:  Take 1 tablet by mouth every 6 hours as needed for headaches                    cyclobenzaprine 5 MG tablet  Also known as:  FLEXERIL  INSTRUCTIONS:  Take 1-2 tablets (5-10 mg) by mouth 3 times daily as needed for muscle spasms (severe pain)                    LORazepam 0.5 MG tablet  Also known as:  ATIVAN  INSTRUCTIONS:  Take 1 tablet (0.5 mg) by mouth every 6 hours as needed for anxiety                    sodium chloride 0.65 % nasal spray  Also known as:  OCEAN  INSTRUCTIONS:  Spray 1 spray into both nostrils every hour as needed for congestion                    SUMAtriptan 25 MG tablet  Also known as:  IMITREX  INSTRUCTIONS:  Take 1 tablet (25 mg) by mouth at onset of headache for migraine or other (post ECT headache) May repeat in 2 hours. Max 8 tablets/24 hours.                    traZODone 50 MG tablet  Also  known as:  DESYREL  INSTRUCTIONS:  Take 1 tablet (50 mg) by mouth nightly as needed for sleep                    vilazodone 10 MG Tabs tablet  Also known as:  VIIBRYD  INSTRUCTIONS:  Take 2 tablets (20 mg) by mouth At Bedtime

## 2019-11-13 ENCOUNTER — ANESTHESIA EVENT (OUTPATIENT)
Dept: BEHAVIORAL HEALTH | Facility: CLINIC | Age: 49
End: 2019-11-13

## 2019-11-13 ENCOUNTER — ANESTHESIA (OUTPATIENT)
Dept: BEHAVIORAL HEALTH | Facility: CLINIC | Age: 49
End: 2019-11-13

## 2019-11-18 ENCOUNTER — ANESTHESIA EVENT (OUTPATIENT)
Dept: BEHAVIORAL HEALTH | Facility: CLINIC | Age: 49
End: 2019-11-18

## 2019-11-18 ENCOUNTER — ANESTHESIA (OUTPATIENT)
Dept: BEHAVIORAL HEALTH | Facility: CLINIC | Age: 49
End: 2019-11-18

## 2019-11-18 ENCOUNTER — HOSPITAL ENCOUNTER (OUTPATIENT)
Dept: BEHAVIORAL HEALTH | Facility: CLINIC | Age: 49
Discharge: HOME | End: 2019-11-18
Attending: PSYCHIATRY & NEUROLOGY | Admitting: PSYCHIATRY & NEUROLOGY
Payer: COMMERCIAL

## 2019-11-18 VITALS
SYSTOLIC BLOOD PRESSURE: 144 MMHG | OXYGEN SATURATION: 97 % | DIASTOLIC BLOOD PRESSURE: 84 MMHG | HEART RATE: 91 BPM | RESPIRATION RATE: 14 BRPM | TEMPERATURE: 98.6 F

## 2019-11-18 DIAGNOSIS — F33.2 SEVERE EPISODE OF RECURRENT MAJOR DEPRESSIVE DISORDER, WITHOUT PSYCHOTIC FEATURES (H): Primary | ICD-10-CM

## 2019-11-18 PROCEDURE — 25000128 H RX IP 250 OP 636: Performed by: ANESTHESIOLOGY

## 2019-11-18 PROCEDURE — 25000128 H RX IP 250 OP 636: Performed by: PSYCHIATRY & NEUROLOGY

## 2019-11-18 PROCEDURE — 25000125 ZZHC RX 250: Performed by: ANESTHESIOLOGY

## 2019-11-18 PROCEDURE — 37000008 ZZH ANESTHESIA TECHNICAL FEE, 1ST 30 MIN

## 2019-11-18 PROCEDURE — 90870 ELECTROCONVULSIVE THERAPY: CPT

## 2019-11-18 RX ORDER — MEPERIDINE HYDROCHLORIDE 25 MG/ML
12.5 INJECTION INTRAMUSCULAR; INTRAVENOUS; SUBCUTANEOUS
Status: DISCONTINUED | OUTPATIENT
Start: 2019-11-18 | End: 2019-11-18

## 2019-11-18 RX ORDER — ONDANSETRON 2 MG/ML
4 INJECTION INTRAMUSCULAR; INTRAVENOUS EVERY 30 MIN PRN
Status: DISCONTINUED | OUTPATIENT
Start: 2019-11-18 | End: 2019-11-19 | Stop reason: HOSPADM

## 2019-11-18 RX ORDER — SODIUM CHLORIDE, SODIUM LACTATE, POTASSIUM CHLORIDE, CALCIUM CHLORIDE 600; 310; 30; 20 MG/100ML; MG/100ML; MG/100ML; MG/100ML
INJECTION, SOLUTION INTRAVENOUS CONTINUOUS
Status: DISCONTINUED | OUTPATIENT
Start: 2019-11-18 | End: 2019-11-18

## 2019-11-18 RX ORDER — KETOROLAC TROMETHAMINE 30 MG/ML
30 INJECTION, SOLUTION INTRAMUSCULAR; INTRAVENOUS
Status: CANCELLED
Start: 2019-11-18

## 2019-11-18 RX ORDER — NALOXONE HYDROCHLORIDE 0.4 MG/ML
.1-.4 INJECTION, SOLUTION INTRAMUSCULAR; INTRAVENOUS; SUBCUTANEOUS
Status: DISCONTINUED | OUTPATIENT
Start: 2019-11-18 | End: 2019-11-19 | Stop reason: HOSPADM

## 2019-11-18 RX ORDER — KETOROLAC TROMETHAMINE 30 MG/ML
30 INJECTION, SOLUTION INTRAMUSCULAR; INTRAVENOUS
Status: COMPLETED | OUTPATIENT
Start: 2019-11-18 | End: 2019-11-18

## 2019-11-18 RX ORDER — ONDANSETRON 4 MG/1
4 TABLET, ORALLY DISINTEGRATING ORAL EVERY 30 MIN PRN
Status: DISCONTINUED | OUTPATIENT
Start: 2019-11-18 | End: 2019-11-19 | Stop reason: HOSPADM

## 2019-11-18 RX ADMIN — KETOROLAC TROMETHAMINE 30 MG: 30 INJECTION, SOLUTION INTRAMUSCULAR at 10:11

## 2019-11-18 RX ADMIN — METHOHEXITAL SODIUM 90 MG: 500 INJECTION, POWDER, LYOPHILIZED, FOR SOLUTION INTRAMUSCULAR; INTRAVENOUS; RECTAL at 10:17

## 2019-11-18 RX ADMIN — Medication 90 MG: at 10:18

## 2019-11-18 NOTE — IP AVS SNAPSHOT
[facility]  [facility] [address] [address]  Phone:  [phone]                                    After Visit Summary   11/18/2019    Zuleyma Reed    MRN: [id number]           After Visit Summary Signature Page    I have received my discharge instructions, and my questions have been answered. I have discussed any challenges I see with this plan with the nurse or doctor.    ..........................................................................................................................................  Patient/Patient Representative Signature      ..........................................................................................................................................  Patient Representative Print Name and Relationship to Patient    ..................................................               ................................................  Date                                   Time    ..........................................................................................................................................  Reviewed by Signature/Title    ...................................................              ..............................................  Date                                               Time          22EPIC Rev 08/18

## 2019-11-18 NOTE — ANESTHESIA PREPROCEDURE EVALUATION
Anesthesia Pre-Procedure Evaluation    Patient: Zuleyma Reed   MRN:     [id number] Gender:   female   Age:    49 year old :      1970        Preoperative Diagnosis: * No pre-op diagnosis entered *   * No procedures listed *     Past Medical History:   Diagnosis Date     Depression with anxiety       Past Surgical History:   Procedure Laterality Date     ORTHOPEDIC SURGERY                       PHYSICAL EXAM:   Mental Status/Neuro: A/A/O   Airway: Facies: Feasible  Mallampati: II  Mouth/Opening: Full  TM distance: > 6 cm  Neck ROM: Full   Respiratory: Auscultation: CTAB     Resp. Rate: Normal     Resp. Effort: Normal      CV: Rhythm: Regular  Rate: Age appropriate  Heart: Normal Sounds  Edema: None   Comments:      Dental: Normal Dentition                LABS:  CBC:   Lab Results   Component Value Date    WBC 9.3 10/23/2019    WBC 8.2 2019    HGB 15.0 10/23/2019    HGB 15.7 2019    HCT 45.1 10/23/2019    HCT 46.4 2019     10/23/2019     2019     BMP:   Lab Results   Component Value Date     10/23/2019     2019    POTASSIUM 4.1 10/23/2019    POTASSIUM 4.1 2019    CHLORIDE 107 10/23/2019    CHLORIDE 109 2019    CO2 27 10/23/2019    CO2 28 2019    BUN 11 10/23/2019    BUN 10 2019    CR 0.85 10/23/2019    CR 0.92 2019    GLC 96 10/23/2019     (H) 2019     COAGS: No results found for: PTT, INR, FIBR  POC:   Lab Results   Component Value Date    HCG Negative 10/24/2019     OTHER:   Lab Results   Component Value Date    SHANA 8.5 10/23/2019    MAG 2.2 2006    ALBUMIN 3.6 10/23/2019    PROTTOTAL 7.1 10/23/2019    ALT 20 10/23/2019    AST 8 10/23/2019    ALKPHOS 61 10/23/2019    BILITOTAL 0.9 10/23/2019    LIPASE 65 2006    TSH 3.34 10/23/2019        Preop Vitals    BP Readings from Last 3 Encounters:   19 (!) 141/83   19 (!) 141/91   19 (!) 129/90    Pulse Readings from Last 3 Encounters:  "  11/06/19 80   11/04/19 97   11/01/19 91      Resp Readings from Last 3 Encounters:   11/11/19 16   11/08/19 16   11/06/19 15    SpO2 Readings from Last 3 Encounters:   11/11/19 95%   11/08/19 98%   11/06/19 95%      Temp Readings from Last 1 Encounters:   11/11/19 37  C (98.6  F)    Ht Readings from Last 1 Encounters:   10/22/19 1.74 m (5' 8.5\")      Wt Readings from Last 1 Encounters:   10/31/19 90.8 kg (200 lb 1.6 oz)    Estimated body mass index is 29.98 kg/m  as calculated from the following:    Height as of 10/22/19: 1.74 m (5' 8.5\").    Weight as of 10/31/19: 90.8 kg (200 lb 1.6 oz).     LDA:        Assessment:   ASA SCORE: 2            Plan:   Anes. Type:  General   Pre-Medication: None   Induction:  IV (Standard)   Airway: Mask   Access/Monitoring: PIV   Maintenance: N/a     Postop Plan:   Postop Pain: Opioids  Postop Sedation/Airway: Not planned     PONV Management:   Adult Risk Factors: Female, Postop Opioids                       Tana Harrell MD  "

## 2019-11-18 NOTE — IP AVS SNAPSHOT
MRN:[id number]                      After Visit Summary   11/18/2019    Zuleyma Reed    MRN: [id number]           Visit Information        Provider Department      11/18/2019  9:30 AM Dmitry Newton MD [facility]           Review of your medicines      CONTINUE these medicines which have NOT CHANGED       Dose / Directions   acetaminophen 325 MG tablet  Commonly known as:  TYLENOL      Dose:  975 mg  Take 975 mg by mouth every 6 hours as needed for mild pain  Refills:  0     aspirin-acetaminophen-caffeine 250-250-65 MG tablet  Commonly known as:  EXCEDRIN MIGRAINE      Dose:  1 tablet  Take 1 tablet by mouth every 6 hours as needed for headaches  Refills:  0     cyclobenzaprine 5 MG tablet  Commonly known as:  FLEXERIL  Used for:  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      Dose:  5-10 mg  Take 1-2 tablets (5-10 mg) by mouth 3 times daily as needed for muscle spasms (severe pain)  Quantity:  90 tablet  Refills:  0     sodium chloride 0.65 % nasal spray  Commonly known as:  OCEAN  Used for:  Seasonal allergic rhinitis due to pollen      Dose:  1 spray  Spray 1 spray into both nostrils every hour as needed for congestion  Quantity:  1 Bottle  Refills:  0     SUMAtriptan 25 MG tablet  Commonly known as:  IMITREX  Used for:  Acute non intractable tension-type headache      Dose:  25 mg  Take 1 tablet (25 mg) by mouth at onset of headache for migraine or other (post ECT headache) May repeat in 2 hours. Max 8 tablets/24 hours.  Quantity:  10 tablet  Refills:  0     traZODone 50 MG tablet  Commonly known as:  DESYREL  Used for:  Severe episode of recurrent major depressive disorder, without psychotic features (H)      Dose:  50 mg  Take 1 tablet (50 mg) by mouth nightly as needed for sleep  Quantity:  30 tablet  Refills:  0     vilazodone 10 MG Tabs tablet  Commonly known as:  VIIBRYD  Used for:  Severe episode of recurrent major depressive disorder, without  psychotic features (H)      Dose:  20 mg  Take 2 tablets (20 mg) by mouth At Bedtime  Quantity:  60 tablet  Refills:  0              Protect others around you: Learn how to safely use, store and throw away your medicines at www.disposemymeds.org.       Follow-ups after your visit       Your next 10 appointments already scheduled    Nov 21, 2019 11:30 AM CST  Adult Med Follow UP with BERTRAM Lorenzo [facility]  Psychiatry Clinic ([facility]) [address]  [address] [address]  [phone]      Nov 22, 2019 10:00 AM CST  Electroconvulsive Therapy with Dmitry Newton MD  [facility] ([facility]) [address]  [facility] [address]  [phone]         Care Instructions       Further instructions from your care team       ECT Discharge Instructions      During your ECT series:      Do not drive or work heavy equipment until 7 days after your last treatment.    Do not drink alcohol or use street drugs (illicit drugs) while you are having treatments.    Do not make important decisions, including legal decisions.    After each treatment:      Get plenty of rest. A responsible adult must stay with your for at least 6 hours.    Avoid heavy or risky activities for 24 hours.    If you feel light-headed, sit for a few minutes before standing. Have someone help you get up.    If you have nausea (feel sick to your stomach): Drink only clear liquids such as apple juice, ginger ale, broth or 7UP, Be sure to drink plenty of liquids. Move to a normal diet as you feel able.     If you received Toradol, wait 6 hours before taking ibuprofen.    Call your doctor if:     You have a fever over 100F (37.7 C) (taken under the tongue), or a fever that last more than 24 hours.    Your IV site is red, swollen, very painful or is getting more tender.    You have nausea that gets very bad or does not  improve.      If you have any symptoms after ECT, tell our staff before your next treatment.    The ECT Department can be reached at [phone].  The ECT Department is open Mondays, Wednesdays and Fridays from 7:00 AM to 2:00 PM.      To speak to a doctor, call: Dr. Dmitry Newton: Office # [phone] and Fax # [phone]    Other: You had Toradol at 10:00 AM  which is an NSAID medication. Do not take any ibuprofen, motrin, aspirin or any other NSAID medication until after 4:00 PM. Questions, check with your physician or pharmacist.    Transported by: Father      _________________________________________________  ________________________  Patient/Responsible party Signature      Date          ________________________________________________   ________________________  Nurse Signature        Date    Additional Information About Your Visit       MyChart Information    [employer] gives you secure access to your electronic health record. If you see a primary care provider, you can also send messages to your care team and make appointments. If you have questions, please call your primary care clinic.  If you do not have a primary care provider, please call [phone] and they will assist you.       Care EveryWhere ID    This is your Care EveryWhere ID. This could be used by other organizations to access your [facility] medical records  [id number]       Your Vitals Were  Most recent update: 11/18/2019 10:27 AM    Blood Pressure   156/86      Pulse   98    Temperature   98.5  F (36.9  C) (Tympanic)    Respirations   14    Pulse Oximetry   93%          Primary Care Provider Office Phone # Fax #    BERTRAM Gilmore [phone]      Equal Access to Services    STACEY MARTINEZ AH: Garrett Hickey, robbie acosta, ivana maki, seema alvarez. [facility] [phone].    ATENCIÓN: Si habla español, tiene a jeff disposición servicios gratuitos de  asistencia lingüística. Helen al [phone].    We comply with applicable federal civil rights laws and [place] laws. We do not discriminate on the basis of race, color, national origin, age, disability, sex, sexual orientation, or gender identity.           Thank you!    Thank you for choosing [facility] for your care. Our goal is always to provide you with excellent care. Hearing back from our patients is one way we can continue to improve our services. Please take a few minutes to complete the written survey that you may receive in the mail after you visit with us. Thank you!            Medication List      Medications       Morning Afternoon Evening Bedtime As Needed   acetaminophen 325 MG tablet  Also known as:  TYLENOL  INSTRUCTIONS:  Take 975 mg by mouth every 6 hours as needed for mild pain                    aspirin-acetaminophen-caffeine 250-250-65 MG tablet  Also known as:  EXCEDRIN MIGRAINE  INSTRUCTIONS:  Take 1 tablet by mouth every 6 hours as needed for headaches                    cyclobenzaprine 5 MG tablet  Also known as:  FLEXERIL  INSTRUCTIONS:  Take 1-2 tablets (5-10 mg) by mouth 3 times daily as needed for muscle spasms (severe pain)                    sodium chloride 0.65 % nasal spray  Also known as:  OCEAN  INSTRUCTIONS:  Spray 1 spray into both nostrils every hour as needed for congestion                    SUMAtriptan 25 MG tablet  Also known as:  IMITREX  INSTRUCTIONS:  Take 1 tablet (25 mg) by mouth at onset of headache for migraine or other (post ECT headache) May repeat in 2 hours. Max 8 tablets/24 hours.                    traZODone 50 MG tablet  Also known as:  DESYREL  INSTRUCTIONS:  Take 1 tablet (50 mg) by mouth nightly as needed for sleep                    vilazodone 10 MG Tabs tablet  Also known as:  VIIBRYD  INSTRUCTIONS:  Take 2 tablets (20 mg) by mouth At Bedtime

## 2019-11-18 NOTE — DISCHARGE INSTRUCTIONS
ECT Discharge Instructions      During your ECT series:      Do not drive or work heavy equipment until 7 days after your last treatment.    Do not drink alcohol or use street drugs (illicit drugs) while you are having treatments.    Do not make important decisions, including legal decisions.    After each treatment:      Get plenty of rest. A responsible adult must stay with your for at least 6 hours.    Avoid heavy or risky activities for 24 hours.    If you feel light-headed, sit for a few minutes before standing. Have someone help you get up.    If you have nausea (feel sick to your stomach): Drink only clear liquids such as apple juice, ginger ale, broth or 7UP, Be sure to drink plenty of liquids. Move to a normal diet as you feel able.     If you received Toradol, wait 6 hours before taking ibuprofen.    Call your doctor if:     You have a fever over 100F (37.7 C) (taken under the tongue), or a fever that last more than 24 hours.    Your IV site is red, swollen, very painful or is getting more tender.    You have nausea that gets very bad or does not improve.      If you have any symptoms after ECT, tell our staff before your next treatment.    The ECT Department can be reached at [phone].  The ECT Department is open Mondays, Wednesdays and Fridays from 7:00 AM to 2:00 PM.      To speak to a doctor, call: Dr. Dmitry Newton: Office # [phone] and Fax # [phone]    Other: You had Toradol at 10:00 AM  which is an NSAID medication. Do not take any ibuprofen, motrin, aspirin or any other NSAID medication until after 4:00 PM. Questions, check with your physician or pharmacist.    Transported by: Father      _________________________________________________  ________________________  Patient/Responsible party Signature      Date          ________________________________________________   ________________________  Nurse Signature        Date

## 2019-11-18 NOTE — ANESTHESIA POSTPROCEDURE EVALUATION
Anesthesia POST Procedure Evaluation    Patient: Zuleyma Reed   MRN:     [id number] Gender:   female   Age:    49 year old :      1970        Preoperative Diagnosis: * No pre-op diagnosis entered *   * No procedures listed *   Postop Comments: No value filed.       Anesthesia Type:  Not documented  General    Reportable Event: NO     PAIN: Uncomplicated   Sign Out status: Comfortable, Well controlled pain     PONV: No PONV   Sign Out status:  No Nausea or Vomiting     Neuro/Psych: Uneventful perioperative course   Sign Out Status: Preoperative baseline; Age appropriate mentation     Airway/Resp.: Uneventful perioperative course   Sign Out Status: Non labored breathing, age appropriate RR; Resp. Status within EXPECTED Parameters     CV: Uneventful perioperative course   Sign Out status: Appropriate BP and perfusion indices; Appropriate HR/Rhythm     Disposition:   Sign Out in:  PACU  Disposition:  Phase II; Home  Recovery Course: Uneventful  Follow-Up: Not required           Last Anesthesia Record Vitals:  CRNA VITALS  2019 0954 - 2019 1027      2019             Pulse:  112    Ht Rate:  112    SpO2:  99 %    Resp Rate (set):  8          Last PACU Vitals:  Vitals Value Taken Time   /86 2019 10:24 AM   Temp 36.9  C (98.5  F) 2019 10:24 AM   Pulse 98 2019 10:24 AM   Resp 14 2019 10:24 AM   SpO2 93 % 2019 10:24 AM   Temp src     NIBP 193/120 2019 10:23 AM   Pulse 112 2019 10:24 AM   SpO2 99 % 2019 10:24 AM   Resp     Temp     Ht Rate 112 2019 10:24 AM   Temp 2           Electronically Signed By: Tana Harrell MD, 2019, 10:27 AM
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
## 2019-11-22 ENCOUNTER — ANESTHESIA EVENT (OUTPATIENT)
Dept: BEHAVIORAL HEALTH | Facility: CLINIC | Age: 49
End: 2019-11-22

## 2019-11-22 ENCOUNTER — ANESTHESIA (OUTPATIENT)
Dept: BEHAVIORAL HEALTH | Facility: CLINIC | Age: 49
End: 2019-11-22

## 2019-11-22 ENCOUNTER — HOSPITAL ENCOUNTER (OUTPATIENT)
Dept: BEHAVIORAL HEALTH | Facility: CLINIC | Age: 49
Discharge: HOME | End: 2019-11-22
Attending: PSYCHIATRY & NEUROLOGY | Admitting: PSYCHIATRY & NEUROLOGY
Payer: COMMERCIAL

## 2019-11-22 VITALS
RESPIRATION RATE: 12 BRPM | OXYGEN SATURATION: 95 % | TEMPERATURE: 97.8 F | SYSTOLIC BLOOD PRESSURE: 129 MMHG | DIASTOLIC BLOOD PRESSURE: 108 MMHG

## 2019-11-22 DIAGNOSIS — F33.2 SEVERE RECURRENT MAJOR DEPRESSION WITHOUT PSYCHOTIC FEATURES (H): Primary | ICD-10-CM

## 2019-11-22 DIAGNOSIS — F33.2 SEVERE EPISODE OF RECURRENT MAJOR DEPRESSIVE DISORDER, WITHOUT PSYCHOTIC FEATURES (H): Primary | ICD-10-CM

## 2019-11-22 PROCEDURE — 25000128 H RX IP 250 OP 636: Performed by: ANESTHESIOLOGY

## 2019-11-22 PROCEDURE — 25000128 H RX IP 250 OP 636: Performed by: PSYCHIATRY & NEUROLOGY

## 2019-11-22 PROCEDURE — 37000008 ZZH ANESTHESIA TECHNICAL FEE, 1ST 30 MIN

## 2019-11-22 PROCEDURE — 25000125 ZZHC RX 250: Performed by: ANESTHESIOLOGY

## 2019-11-22 PROCEDURE — 90870 ELECTROCONVULSIVE THERAPY: CPT

## 2019-11-22 RX ORDER — FENTANYL CITRATE 50 UG/ML
25-50 INJECTION, SOLUTION INTRAMUSCULAR; INTRAVENOUS
Status: CANCELLED | OUTPATIENT
Start: 2019-11-22

## 2019-11-22 RX ORDER — ONDANSETRON 4 MG/1
4 TABLET, ORALLY DISINTEGRATING ORAL EVERY 30 MIN PRN
Status: DISCONTINUED | OUTPATIENT
Start: 2019-11-22 | End: 2019-11-23 | Stop reason: HOSPADM

## 2019-11-22 RX ORDER — ONDANSETRON 4 MG/1
4 TABLET, ORALLY DISINTEGRATING ORAL EVERY 30 MIN PRN
Status: CANCELLED | OUTPATIENT
Start: 2019-11-22

## 2019-11-22 RX ORDER — ONDANSETRON 2 MG/ML
4 INJECTION INTRAMUSCULAR; INTRAVENOUS EVERY 30 MIN PRN
Status: DISCONTINUED | OUTPATIENT
Start: 2019-11-22 | End: 2019-11-23 | Stop reason: HOSPADM

## 2019-11-22 RX ORDER — NALOXONE HYDROCHLORIDE 0.4 MG/ML
.1-.4 INJECTION, SOLUTION INTRAMUSCULAR; INTRAVENOUS; SUBCUTANEOUS
Status: DISCONTINUED | OUTPATIENT
Start: 2019-11-22 | End: 2019-11-23 | Stop reason: HOSPADM

## 2019-11-22 RX ORDER — ONDANSETRON 2 MG/ML
4 INJECTION INTRAMUSCULAR; INTRAVENOUS EVERY 30 MIN PRN
Status: CANCELLED | OUTPATIENT
Start: 2019-11-22

## 2019-11-22 RX ORDER — SODIUM CHLORIDE, SODIUM LACTATE, POTASSIUM CHLORIDE, CALCIUM CHLORIDE 600; 310; 30; 20 MG/100ML; MG/100ML; MG/100ML; MG/100ML
INJECTION, SOLUTION INTRAVENOUS CONTINUOUS
Status: CANCELLED | OUTPATIENT
Start: 2019-11-22

## 2019-11-22 RX ORDER — MEPERIDINE HYDROCHLORIDE 25 MG/ML
12.5 INJECTION INTRAMUSCULAR; INTRAVENOUS; SUBCUTANEOUS
Status: DISCONTINUED | OUTPATIENT
Start: 2019-11-22 | End: 2019-11-23 | Stop reason: HOSPADM

## 2019-11-22 RX ORDER — FENTANYL CITRATE 50 UG/ML
25-50 INJECTION, SOLUTION INTRAMUSCULAR; INTRAVENOUS
Status: DISCONTINUED | OUTPATIENT
Start: 2019-11-22 | End: 2019-11-23 | Stop reason: HOSPADM

## 2019-11-22 RX ORDER — KETOROLAC TROMETHAMINE 30 MG/ML
30 INJECTION, SOLUTION INTRAMUSCULAR; INTRAVENOUS
Status: COMPLETED | OUTPATIENT
Start: 2019-11-22 | End: 2019-11-22

## 2019-11-22 RX ORDER — NORTRIPTYLINE HCL 25 MG
25 CAPSULE ORAL AT BEDTIME
Qty: 10 CAPSULE | Refills: 0 | Status: SHIPPED | OUTPATIENT
Start: 2019-11-22 | End: 2019-12-02

## 2019-11-22 RX ORDER — SODIUM CHLORIDE, SODIUM LACTATE, POTASSIUM CHLORIDE, CALCIUM CHLORIDE 600; 310; 30; 20 MG/100ML; MG/100ML; MG/100ML; MG/100ML
INJECTION, SOLUTION INTRAVENOUS CONTINUOUS
Status: DISCONTINUED | OUTPATIENT
Start: 2019-11-22 | End: 2019-11-23 | Stop reason: HOSPADM

## 2019-11-22 RX ORDER — NORTRIPTYLINE HCL 25 MG
25 CAPSULE ORAL AT BEDTIME
Qty: 10 CAPSULE | Refills: 0 | Status: SHIPPED | OUTPATIENT
Start: 2019-11-22 | End: 2019-11-27

## 2019-11-22 RX ORDER — KETOROLAC TROMETHAMINE 30 MG/ML
30 INJECTION, SOLUTION INTRAMUSCULAR; INTRAVENOUS
Status: CANCELLED
Start: 2019-11-22

## 2019-11-22 RX ORDER — NALOXONE HYDROCHLORIDE 0.4 MG/ML
.1-.4 INJECTION, SOLUTION INTRAMUSCULAR; INTRAVENOUS; SUBCUTANEOUS
Status: CANCELLED | OUTPATIENT
Start: 2019-11-22 | End: 2019-11-23

## 2019-11-22 RX ADMIN — Medication 90 MG: at 11:20

## 2019-11-22 RX ADMIN — KETOROLAC TROMETHAMINE 30 MG: 30 INJECTION, SOLUTION INTRAMUSCULAR at 11:04

## 2019-11-22 RX ADMIN — METHOHEXITAL SODIUM 90 MG: 500 INJECTION, POWDER, LYOPHILIZED, FOR SOLUTION INTRAMUSCULAR; INTRAVENOUS; RECTAL at 11:18

## 2019-11-22 NOTE — DISCHARGE INSTRUCTIONS
ECT Discharge Instructions      During your ECT series:      Do not drive or work heavy equipment until 7 days after your last treatment.    Do not drink alcohol or use street drugs (illicit drugs) while you are having treatments.    Do not make important decisions, including legal decisions.    After each treatment:      Get plenty of rest. A responsible adult must stay with your for at least 6 hours.    Do not drive for 24 hours after ECT treatment. If you have more than one treatment within a week, no driving until 7 days after your last ECT treatment.    Avoid heavy or risky activities for 24 hours.    If you feel light-headed, sit for a few minutes before standing. Have someone help you get up.    If you have nausea (feel sick to your stomach): Drink only clear liquids such as apple juice, ginger ale, broth or 7UP, Be sure to drink plenty of liquids. Move to a normal diet as you feel able.     If you received Toradol, wait 6 hours before taking ibuprofen.    Call your doctor if:     You have a fever over 100F (37.7 C) (taken under the tongue), or a fever that last more than 24 hours.    Your IV site is red, swollen, very painful or is getting more tender.    You have nausea that gets very bad or does not improve.      If you have any symptoms after ECT, tell our staff before your next treatment.    The ECT Department can be reached at [phone].  The ECT Department is open Mondays, Wednesdays and Fridays from 7:00 AM to 2:00 PM.     To speak to a doctor, call: Dr Newton [phone]    Other: You have received Toradol today at 11:04 AM . Toradol is an NSAID.  Do not take any NSAID's including: Ibuprofen, Naproxen Sodium, Asprin , Advil, Motrin, Aleve, Juan Jose, etc., until six hours after the above time which would be 5:04 PM. If you have any questions check back with your Physician, or pharmacist.     Transported by:  Nicholas      _________________________________________________  ________________________  Patient/Responsible party Signature      Date          ________________________________________________   ________________________  Nurse Signature        Date

## 2019-11-22 NOTE — PROGRESS NOTES
Patient meets recovery room discharge criteria. Pt discharged from recovery room via wheelchair to discharge room at  1200.

## 2019-11-22 NOTE — ANESTHESIA POSTPROCEDURE EVALUATION
Anesthesia POST Procedure Evaluation    Patient: Zuleyma Reed   MRN:     [id number] Gender:   female   Age:    49 year old :      1970        Preoperative Diagnosis: * No pre-op diagnosis entered *   * No procedures listed *   Postop Comments: No value filed.       Anesthesia Type:  Not documented  General    Reportable Event: NO     PAIN: Uncomplicated   Sign Out status: Comfortable, Well controlled pain     PONV: No PONV   Sign Out status:  No Nausea or Vomiting     Neuro/Psych: Uneventful perioperative course   Sign Out Status: Preoperative baseline; Age appropriate mentation     Airway/Resp.: Uneventful perioperative course   Sign Out Status: Non labored breathing, age appropriate RR; Resp. Status within EXPECTED Parameters     CV: Uneventful perioperative course   Sign Out status: Appropriate BP and perfusion indices; Appropriate HR/Rhythm     Disposition:   Sign Out in:  PACU  Disposition:  Phase II; Home  Recovery Course: Uneventful  Follow-Up: Not required           Last Anesthesia Record Vitals:  CRNA VITALS  2019 1057 - 2019 1128      2019             Pulse:  107    Ht Rate:  104    SpO2:  99 %    Resp Rate (set):  8          Last PACU Vitals:  Vitals Value Taken Time   BP     Temp     Pulse     Resp     SpO2     Temp src     NIBP 174/102 2019 11:25 AM   Pulse 107 2019 11:27 AM   SpO2 99 % 2019 11:27 AM   Resp     Temp     Ht Rate 104 2019 11:27 AM   Temp 2           Electronically Signed By: Raymond Webster DO, 2019, 11:28 AM

## 2019-11-22 NOTE — IP AVS SNAPSHOT
MRN:[id number]                      After Visit Summary   11/22/2019    Zuleyma Reed    MRN: [id number]           Visit Information        Provider Department      11/22/2019 10:00 AM Dmitry Newton MD [facility]           Review of your medicines      CONTINUE these medicines which have NOT CHANGED       Dose / Directions   acetaminophen 325 MG tablet  Commonly known as:  TYLENOL      Dose:  975 mg  Take 975 mg by mouth every 6 hours as needed for mild pain  Refills:  0     aspirin-acetaminophen-caffeine 250-250-65 MG tablet  Commonly known as:  EXCEDRIN MIGRAINE      Dose:  1 tablet  Take 1 tablet by mouth every 6 hours as needed for headaches  Refills:  0     cyclobenzaprine 5 MG tablet  Commonly known as:  FLEXERIL  Used for:  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      Dose:  5-10 mg  Take 1-2 tablets (5-10 mg) by mouth 3 times daily as needed for muscle spasms (severe pain)  Quantity:  90 tablet  Refills:  0     sodium chloride 0.65 % nasal spray  Commonly known as:  OCEAN  Used for:  Seasonal allergic rhinitis due to pollen      Dose:  1 spray  Spray 1 spray into both nostrils every hour as needed for congestion  Quantity:  1 Bottle  Refills:  0     SUMAtriptan 25 MG tablet  Commonly known as:  IMITREX  Used for:  Acute non intractable tension-type headache      Dose:  25 mg  Take 1 tablet (25 mg) by mouth at onset of headache for migraine or other (post ECT headache) May repeat in 2 hours. Max 8 tablets/24 hours.  Quantity:  10 tablet  Refills:  0     traZODone 50 MG tablet  Commonly known as:  DESYREL  Used for:  Severe episode of recurrent major depressive disorder, without psychotic features (H)      Dose:  50 mg  Take 1 tablet (50 mg) by mouth nightly as needed for sleep  Quantity:  30 tablet  Refills:  0     vilazodone 10 MG Tabs tablet  Commonly known as:  VIIBRYD  Used for:  Severe episode of recurrent major depressive disorder, without  psychotic features (H)      Dose:  20 mg  Take 2 tablets (20 mg) by mouth At Bedtime  Quantity:  60 tablet  Refills:  0              Protect others around you: Learn how to safely use, store and throw away your medicines at www.disposemymeds.org.       Follow-ups after your visit       Your next 10 appointments already scheduled    Nov 25, 2019 10:30 AM CST  Electroconvulsive Therapy with Dmitry Newton MD  [facility] ([facility]) [address] [address] [phone]         Care Instructions       Further instructions from your care team       ECT Discharge Instructions      During your ECT series:      Do not drive or work heavy equipment until 7 days after your last treatment.    Do not drink alcohol or use street drugs (illicit drugs) while you are having treatments.    Do not make important decisions, including legal decisions.    After each treatment:      Get plenty of rest. A responsible adult must stay with your for at least 6 hours.    Do not drive for 24 hours after ECT treatment. If you have more than one treatment within a week, no driving until 7 days after your last ECT treatment.    Avoid heavy or risky activities for 24 hours.    If you feel light-headed, sit for a few minutes before standing. Have someone help you get up.    If you have nausea (feel sick to your stomach): Drink only clear liquids such as apple juice, ginger ale, broth or 7UP, Be sure to drink plenty of liquids. Move to a normal diet as you feel able.     If you received Toradol, wait 6 hours before taking ibuprofen.    Call your doctor if:     You have a fever over 100F (37.7 C) (taken under the tongue), or a fever that last more than 24 hours.    Your IV site is red, swollen, very painful or is getting more tender.    You have nausea that gets very bad or does not improve.      If you have any symptoms after ECT, tell our staff before your  next treatment.    The ECT Department can be reached at [phone].  The ECT Department is open Mondays, Wednesdays and Fridays from 7:00 AM to 2:00 PM.     To speak to a doctor, call: Dr Newton [phone]    Other: You have received Toradol today at 11:04 AM . Toradol is an NSAID.  Do not take any NSAID's including: Ibuprofen, Naproxen Sodium, Asprin , Advil, Motrin, Aleve, Juan Jose, etc., until six hours after the above time which would be 5:04 PM. If you have any questions check back with your Physician, or pharmacist.     Transported by: Nicholas      _________________________________________________  ________________________  Patient/Responsible party Signature      Date          ________________________________________________   ________________________  Nurse Signature        Date    Additional Information About Your Visit       MyChart Information    [facility] gives you secure access to your electronic health record. If you see a primary care provider, you can also send messages to your care team and make appointments. If you have questions, please call your primary care clinic.  If you do not have a primary care provider, please call [phone] and they will assist you.       Care EveryWhere ID    This is your Care EveryWhere ID. This could be used by other organizations to access your [facility] medical records  [id number]       Your Vitals Were  Most recent update: 11/22/2019 11:44 AM    Blood Pressure   144/105   (BP Location: Left arm)    Temperature   98.1  F (36.7  C) (Tympanic)    Respirations   12    Pulse Oximetry   95%           Primary Care Provider Office Phone # Fax #    BERTRAM Gilmore [facility] [phone]      Equal Access to Services    STACEY MARTINEZ : Garrett Hickey, robbie acosta, seema de la torre. [facility] [phone].    ATENCIÓN: Si habla español, tiene a jeff disposición servicios gratuitos de asistencia  lingüísticaAdelina Cervantes al [phone].    We comply with applicable federal civil rights laws and [place] laws. We do not discriminate on the basis of race, color, national origin, age, disability, sex, sexual orientation, or gender identity.           Thank you!    Thank you for choosing [facility] for your care. Our goal is always to provide you with excellent care. Hearing back from our patients is one way we can continue to improve our services. Please take a few minutes to complete the written survey that you may receive in the mail after you visit with us. Thank you!            Medication List      Medications       Morning Afternoon Evening Bedtime As Needed   acetaminophen 325 MG tablet  Also known as:  TYLENOL  INSTRUCTIONS:  Take 975 mg by mouth every 6 hours as needed for mild pain                    aspirin-acetaminophen-caffeine 250-250-65 MG tablet  Also known as:  EXCEDRIN MIGRAINE  INSTRUCTIONS:  Take 1 tablet by mouth every 6 hours as needed for headaches                    cyclobenzaprine 5 MG tablet  Also known as:  FLEXERIL  INSTRUCTIONS:  Take 1-2 tablets (5-10 mg) by mouth 3 times daily as needed for muscle spasms (severe pain)                    sodium chloride 0.65 % nasal spray  Also known as:  OCEAN  INSTRUCTIONS:  Spray 1 spray into both nostrils every hour as needed for congestion                    SUMAtriptan 25 MG tablet  Also known as:  IMITREX  INSTRUCTIONS:  Take 1 tablet (25 mg) by mouth at onset of headache for migraine or other (post ECT headache) May repeat in 2 hours. Max 8 tablets/24 hours.                    traZODone 50 MG tablet  Also known as:  DESYREL  INSTRUCTIONS:  Take 1 tablet (50 mg) by mouth nightly as needed for sleep                    vilazodone 10 MG Tabs tablet  Also known as:  VIIBRYD  INSTRUCTIONS:  Take 2 tablets (20 mg) by mouth At Bedtime

## 2019-11-22 NOTE — ANESTHESIA PREPROCEDURE EVALUATION
Anesthesia Pre-Procedure Evaluation    Patient: Zuleyma Reed   MRN:     [id number] Gender:   female   Age:    49 year old :      1970        Preoperative Diagnosis: * No surgery found *        Past Medical History:   Diagnosis Date     Depression with anxiety       Past Surgical History:   Procedure Laterality Date     ORTHOPEDIC SURGERY                     PHYSICAL EXAM:   Mental Status/Neuro: A/A/O   Airway: Facies: Feasible  Mallampati: II  Mouth/Opening: Full  TM distance: > 6 cm  Neck ROM: Full   Respiratory: Auscultation: CTAB     Resp. Rate: Normal     Resp. Effort: Normal      CV: Rhythm: Regular  Rate: Age appropriate  Heart: Normal Sounds  Edema: None   Comments:      Dental: Normal Dentition                LABS:  CBC:   Lab Results   Component Value Date    WBC 9.3 10/23/2019    WBC 8.2 2019    HGB 15.0 10/23/2019    HGB 15.7 2019    HCT 45.1 10/23/2019    HCT 46.4 2019     10/23/2019     2019     BMP:   Lab Results   Component Value Date     10/23/2019     2019    POTASSIUM 4.1 10/23/2019    POTASSIUM 4.1 2019    CHLORIDE 107 10/23/2019    CHLORIDE 109 2019    CO2 27 10/23/2019    CO2 28 2019    BUN 11 10/23/2019    BUN 10 2019    CR 0.85 10/23/2019    CR 0.92 2019    GLC 96 10/23/2019     (H) 2019     COAGS: No results found for: PTT, INR, FIBR  POC:   Lab Results   Component Value Date    HCG Negative 10/24/2019     OTHER:   Lab Results   Component Value Date    SHANA 8.5 10/23/2019    MAG 2.2 2006    ALBUMIN 3.6 10/23/2019    PROTTOTAL 7.1 10/23/2019    ALT 20 10/23/2019    AST 8 10/23/2019    ALKPHOS 61 10/23/2019    BILITOTAL 0.9 10/23/2019    LIPASE 65 2006    TSH 3.34 10/23/2019        Preop Vitals    BP Readings from Last 3 Encounters:   19 (!) 156/80   19 (!) 144/84   19 (!) 141/83    Pulse Readings from Last 3 Encounters:   19 91   19 80  "  11/04/19 97      Resp Readings from Last 3 Encounters:   11/22/19 15   11/18/19 14   11/11/19 16    SpO2 Readings from Last 3 Encounters:   11/22/19 98%   11/18/19 97%   11/11/19 95%      Temp Readings from Last 1 Encounters:   11/22/19 37.2  C (99  F) (Tympanic)    Ht Readings from Last 1 Encounters:   10/22/19 1.74 m (5' 8.5\")      Wt Readings from Last 1 Encounters:   10/31/19 90.8 kg (200 lb 1.6 oz)    Estimated body mass index is 29.98 kg/m  as calculated from the following:    Height as of 10/22/19: 1.74 m (5' 8.5\").    Weight as of 10/31/19: 90.8 kg (200 lb 1.6 oz).     LDA:        Assessment:   ASA SCORE: 2            Plan:   Anes. Type:  General   Pre-Medication: None   Induction:  IV (Standard)   Airway: Mask   Access/Monitoring: PIV   Maintenance: N/a     Postop Plan:   Postop Pain: Opioids  Postop Sedation/Airway: Not planned     PONV Management:   Adult Risk Factors: Female, Postop Opioids                   Raymond Webster,   "
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
## 2019-11-22 NOTE — IP AVS SNAPSHOT
[facility]  [facility] [address] [address]  Phone:  [phone]                                    After Visit Summary   11/22/2019    Zuleyma Reed    MRN: [id number]           After Visit Summary Signature Page    I have received my discharge instructions, and my questions have been answered. I have discussed any challenges I see with this plan with the nurse or doctor.    ..........................................................................................................................................  Patient/Patient Representative Signature      ..........................................................................................................................................  Patient Representative Print Name and Relationship to Patient    ..................................................               ................................................  Date                                   Time    ..........................................................................................................................................  Reviewed by Signature/Title    ...................................................              ..............................................  Date                                               Time          22EPIC Rev 08/18

## 2019-11-25 ENCOUNTER — ANESTHESIA EVENT (OUTPATIENT)
Dept: BEHAVIORAL HEALTH | Facility: CLINIC | Age: 49
End: 2019-11-25

## 2019-11-25 ENCOUNTER — ANESTHESIA (OUTPATIENT)
Dept: BEHAVIORAL HEALTH | Facility: CLINIC | Age: 49
End: 2019-11-25

## 2019-11-25 ENCOUNTER — HOSPITAL ENCOUNTER (OUTPATIENT)
Dept: BEHAVIORAL HEALTH | Facility: CLINIC | Age: 49
Discharge: HOME | End: 2019-11-25
Attending: PSYCHIATRY & NEUROLOGY | Admitting: PSYCHIATRY & NEUROLOGY
Payer: COMMERCIAL

## 2019-11-25 VITALS
SYSTOLIC BLOOD PRESSURE: 153 MMHG | OXYGEN SATURATION: 95 % | RESPIRATION RATE: 18 BRPM | TEMPERATURE: 98.5 F | DIASTOLIC BLOOD PRESSURE: 99 MMHG

## 2019-11-25 DIAGNOSIS — F33.2 SEVERE EPISODE OF RECURRENT MAJOR DEPRESSIVE DISORDER, WITHOUT PSYCHOTIC FEATURES (H): Primary | ICD-10-CM

## 2019-11-25 PROCEDURE — 37000008 ZZH ANESTHESIA TECHNICAL FEE, 1ST 30 MIN

## 2019-11-25 PROCEDURE — 90870 ELECTROCONVULSIVE THERAPY: CPT

## 2019-11-25 PROCEDURE — 25000128 H RX IP 250 OP 636: Performed by: ANESTHESIOLOGY

## 2019-11-25 PROCEDURE — 25000125 ZZHC RX 250: Performed by: ANESTHESIOLOGY

## 2019-11-25 PROCEDURE — 25000128 H RX IP 250 OP 636: Performed by: PSYCHIATRY & NEUROLOGY

## 2019-11-25 RX ORDER — KETOROLAC TROMETHAMINE 30 MG/ML
30 INJECTION, SOLUTION INTRAMUSCULAR; INTRAVENOUS
Status: COMPLETED | OUTPATIENT
Start: 2019-11-25 | End: 2019-11-25

## 2019-11-25 RX ORDER — SODIUM CHLORIDE, SODIUM LACTATE, POTASSIUM CHLORIDE, CALCIUM CHLORIDE 600; 310; 30; 20 MG/100ML; MG/100ML; MG/100ML; MG/100ML
INJECTION, SOLUTION INTRAVENOUS CONTINUOUS
Status: DISCONTINUED | OUTPATIENT
Start: 2019-11-25 | End: 2019-11-26 | Stop reason: HOSPADM

## 2019-11-25 RX ORDER — ONDANSETRON 2 MG/ML
4 INJECTION INTRAMUSCULAR; INTRAVENOUS EVERY 30 MIN PRN
Status: DISCONTINUED | OUTPATIENT
Start: 2019-11-25 | End: 2019-11-26 | Stop reason: HOSPADM

## 2019-11-25 RX ORDER — KETOROLAC TROMETHAMINE 30 MG/ML
30 INJECTION, SOLUTION INTRAMUSCULAR; INTRAVENOUS
Status: CANCELLED
Start: 2019-11-25

## 2019-11-25 RX ORDER — FENTANYL CITRATE 50 UG/ML
25-50 INJECTION, SOLUTION INTRAMUSCULAR; INTRAVENOUS
Status: DISCONTINUED | OUTPATIENT
Start: 2019-11-25 | End: 2019-11-26 | Stop reason: HOSPADM

## 2019-11-25 RX ORDER — NALOXONE HYDROCHLORIDE 0.4 MG/ML
.1-.4 INJECTION, SOLUTION INTRAMUSCULAR; INTRAVENOUS; SUBCUTANEOUS
Status: DISCONTINUED | OUTPATIENT
Start: 2019-11-25 | End: 2019-11-26 | Stop reason: HOSPADM

## 2019-11-25 RX ORDER — ONDANSETRON 4 MG/1
4 TABLET, ORALLY DISINTEGRATING ORAL EVERY 30 MIN PRN
Status: DISCONTINUED | OUTPATIENT
Start: 2019-11-25 | End: 2019-11-26 | Stop reason: HOSPADM

## 2019-11-25 RX ADMIN — METHOHEXITAL SODIUM 90 MG: 500 INJECTION, POWDER, LYOPHILIZED, FOR SOLUTION INTRAMUSCULAR; INTRAVENOUS; RECTAL at 11:44

## 2019-11-25 RX ADMIN — Medication 90 MG: at 11:44

## 2019-11-25 RX ADMIN — KETOROLAC TROMETHAMINE 30 MG: 30 INJECTION, SOLUTION INTRAMUSCULAR; INTRAVENOUS at 11:39

## 2019-11-25 NOTE — ANESTHESIA POSTPROCEDURE EVALUATION
Anesthesia POST Procedure Evaluation    Patient: Zuleyma Reed   MRN:     [id number] Gender:   female   Age:    49 year old :      1970        Preoperative Diagnosis: * No pre-op diagnosis entered *   * No procedures listed *   Postop Comments: No value filed.       Anesthesia Type:  Not documented  General    Reportable Event: NO     PAIN: Uncomplicated   Sign Out status: Comfortable, Well controlled pain     PONV: No PONV   Sign Out status:  No Nausea or Vomiting     Neuro/Psych: Uneventful perioperative course   Sign Out Status: Preoperative baseline; Age appropriate mentation     Airway/Resp.: Uneventful perioperative course   Sign Out Status: Non labored breathing, age appropriate RR; Resp. Status within EXPECTED Parameters     CV: Uneventful perioperative course   Sign Out status: Appropriate BP and perfusion indices; Appropriate HR/Rhythm     Disposition:   Sign Out in:  PACU  Disposition:  Phase II; Home  Recovery Course: Uneventful  Follow-Up: Not required           Last Anesthesia Record Vitals:  CRNA VITALS  2019 1124 - 2019 1203      2019             Pulse:  102    Ht Rate:  63    SpO2:  100 %    Resp Rate (set):  8          Last PACU Vitals:  Vitals Value Taken Time   /103 2019 11:55 AM   Temp 36.9  C (98.4  F) 2019 11:55 AM   Pulse     Resp 16 2019 11:55 AM   SpO2 94 % 2019 11:55 AM   Temp src     NIBP 220/115 2019 11:51 AM   Pulse 102 2019 11:55 AM   SpO2 100 % 2019 11:55 AM   Resp     Temp     Ht Rate 63 2019 11:55 AM   Temp 2           Electronically Signed By: Zuleyma Sweet MD, 2019, 12:03 PM

## 2019-11-25 NOTE — IP AVS SNAPSHOT
[facility]  [facility] [address] [address]  Phone:  [phone]                                    After Visit Summary   11/25/2019    Zuleyma Reed    MRN: [id number]           After Visit Summary Signature Page    I have received my discharge instructions, and my questions have been answered. I have discussed any challenges I see with this plan with the nurse or doctor.    ..........................................................................................................................................  Patient/Patient Representative Signature      ..........................................................................................................................................  Patient Representative Print Name and Relationship to Patient    ..................................................               ................................................  Date                                   Time    ..........................................................................................................................................  Reviewed by Signature/Title    ...................................................              ..............................................  Date                                               Time          22EPIC Rev 08/18

## 2019-11-25 NOTE — DISCHARGE INSTRUCTIONS
ECT Discharge Instructions      During your ECT series:      Do not drive for 24 hours after treatment.  If you will have more than one treatment within a week, no driving until 7 days after your last ECT treatment.    Do not drink alcohol or use street drugs (illicit drugs) while you are having treatments.    Do not make important decisions, including legal decisions.    After each treatment:      Get plenty of rest. A responsible adult must stay with your for at least 6 hours.    Avoid heavy or risky activities for 24 hours.    If you feel light-headed, sit for a few minutes before standing. Have someone help you get up.    If you have nausea (feel sick to your stomach): Drink only clear liquids such as apple juice, ginger ale, broth or 7UP, Be sure to drink plenty of liquids. Move to a normal diet as you feel able.     If you received Toradol, wait 6 hours before taking ibuprofen.    Call your doctor if:     You have a fever over 100F (37.7 C) (taken under the tongue), or a fever that last more than 24 hours.    Your IV site is red, swollen, very painful or is getting more tender.    You have nausea that gets very bad or does not improve.      If you have any symptoms after ECT, tell our staff before your next treatment.    The ECT Department can be reached at [phone].  The ECT Department is open Mondays, Wednesdays and Fridays from 7:00 AM to 2:00 PM.    Toradol:     You had Toradol today at 1130am  which is a NSAID medication.  Do not take any Ibuprofen, Motrin, Aspirin, Advil, Aleve, Excedrin, or any other NSAID medication until after 530pm.  Questions,  check with your physician or pharmacist.        To speak to Dr. Dmitry Newton: Office # [phone] and fax # [phone]    Per Dr Thomas 1/2 tab Viibryd at bedtime for 2 nights to see if that suppresses the vivid dreaming, will reassess on Wed.  Increase nortriptyline to 50 mg QHS
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
## 2019-11-25 NOTE — ANESTHESIA PREPROCEDURE EVALUATION
Anesthesia Pre-Procedure Evaluation    Patient: Zuleyma Reed   MRN:     [id number] Gender:   female   Age:    49 year old :      1970        Preoperative Diagnosis: * No surgery found *        Past Medical History:   Diagnosis Date     Depression with anxiety       Past Surgical History:   Procedure Laterality Date     ORTHOPEDIC SURGERY                       PHYSICAL EXAM:   Mental Status/Neuro: A/A/O   Airway: Facies: Feasible  Mallampati: II  Mouth/Opening: Full  TM distance: > 6 cm  Neck ROM: Full   Respiratory: Auscultation: CTAB     Resp. Rate: Normal     Resp. Effort: Normal      CV: Rhythm: Regular  Rate: Age appropriate  Heart: Normal Sounds  Edema: None   Comments:      Dental: Normal Dentition                LABS:  CBC:   Lab Results   Component Value Date    WBC 9.3 10/23/2019    WBC 8.2 2019    HGB 15.0 10/23/2019    HGB 15.7 2019    HCT 45.1 10/23/2019    HCT 46.4 2019     10/23/2019     2019     BMP:   Lab Results   Component Value Date     10/23/2019     2019    POTASSIUM 4.1 10/23/2019    POTASSIUM 4.1 2019    CHLORIDE 107 10/23/2019    CHLORIDE 109 2019    CO2 27 10/23/2019    CO2 28 2019    BUN 11 10/23/2019    BUN 10 2019    CR 0.85 10/23/2019    CR 0.92 2019    GLC 96 10/23/2019     (H) 2019     COAGS: No results found for: PTT, INR, FIBR  POC:   Lab Results   Component Value Date    HCG Negative 10/24/2019     OTHER:   Lab Results   Component Value Date    SHNAA 8.5 10/23/2019    MAG 2.2 2006    ALBUMIN 3.6 10/23/2019    PROTTOTAL 7.1 10/23/2019    ALT 20 10/23/2019    AST 8 10/23/2019    ALKPHOS 61 10/23/2019    BILITOTAL 0.9 10/23/2019    LIPASE 65 2006    TSH 3.34 10/23/2019        Preop Vitals    BP Readings from Last 3 Encounters:   19 (!) 160/94   19 (!) 129/108   19 (!) 144/84    Pulse Readings from Last 3 Encounters:   19 91   19 80  "  11/04/19 97      Resp Readings from Last 3 Encounters:   11/25/19 15   11/22/19 12   11/18/19 14    SpO2 Readings from Last 3 Encounters:   11/25/19 98%   11/22/19 95%   11/18/19 97%      Temp Readings from Last 1 Encounters:   11/25/19 37.4  C (99.3  F) (Tympanic)    Ht Readings from Last 1 Encounters:   10/22/19 1.74 m (5' 8.5\")      Wt Readings from Last 1 Encounters:   10/31/19 90.8 kg (200 lb 1.6 oz)    Estimated body mass index is 29.98 kg/m  as calculated from the following:    Height as of 10/22/19: 1.74 m (5' 8.5\").    Weight as of 10/31/19: 90.8 kg (200 lb 1.6 oz).     LDA:        Assessment:   ASA SCORE: 2    H&P: History and physical reviewed and following examination; no interval change.    NPO Status: NPO Appropriate     Plan:   Anes. Type:  General   Pre-Medication: None   Induction:  IV (Standard)   Airway: Mask   Access/Monitoring: PIV   Maintenance: N/a     Postop Plan:   Postop Pain: None  Postop Sedation/Airway: Not planned  Disposition: Outpatient     PONV Management: Adult Risk Factors: Female     CONSENT: Direct conversation   Plan and risks discussed with: Patient   Blood Products: Consent Deferred (Minimal Blood Loss)                       Zuleyma Sweet MD  "

## 2019-11-27 ENCOUNTER — ANESTHESIA (OUTPATIENT)
Dept: BEHAVIORAL HEALTH | Facility: CLINIC | Age: 49
End: 2019-11-27

## 2019-11-27 ENCOUNTER — HOSPITAL ENCOUNTER (OUTPATIENT)
Dept: BEHAVIORAL HEALTH | Facility: CLINIC | Age: 49
Discharge: HOME | End: 2019-11-27
Attending: FAMILY MEDICINE | Admitting: FAMILY MEDICINE
Payer: COMMERCIAL

## 2019-11-27 ENCOUNTER — ANESTHESIA EVENT (OUTPATIENT)
Dept: BEHAVIORAL HEALTH | Facility: CLINIC | Age: 49
End: 2019-11-27

## 2019-11-27 VITALS
OXYGEN SATURATION: 99 % | DIASTOLIC BLOOD PRESSURE: 94 MMHG | SYSTOLIC BLOOD PRESSURE: 137 MMHG | TEMPERATURE: 98.6 F | RESPIRATION RATE: 20 BRPM

## 2019-11-27 DIAGNOSIS — F33.2 SEVERE EPISODE OF RECURRENT MAJOR DEPRESSIVE DISORDER, WITHOUT PSYCHOTIC FEATURES (H): Primary | ICD-10-CM

## 2019-11-27 PROCEDURE — 25000125 ZZHC RX 250: Performed by: ANESTHESIOLOGY

## 2019-11-27 PROCEDURE — 37000008 ZZH ANESTHESIA TECHNICAL FEE, 1ST 30 MIN

## 2019-11-27 PROCEDURE — 25000128 H RX IP 250 OP 636: Performed by: ANESTHESIOLOGY

## 2019-11-27 PROCEDURE — 90870 ELECTROCONVULSIVE THERAPY: CPT

## 2019-11-27 PROCEDURE — 25000128 H RX IP 250 OP 636: Performed by: PSYCHIATRY & NEUROLOGY

## 2019-11-27 RX ORDER — LABETALOL HYDROCHLORIDE 5 MG/ML
INJECTION, SOLUTION INTRAVENOUS PRN
Status: DISCONTINUED | OUTPATIENT
Start: 2019-11-27 | End: 2019-11-27

## 2019-11-27 RX ORDER — KETOROLAC TROMETHAMINE 30 MG/ML
30 INJECTION, SOLUTION INTRAMUSCULAR; INTRAVENOUS
Status: COMPLETED | OUTPATIENT
Start: 2019-11-27 | End: 2019-11-27

## 2019-11-27 RX ORDER — SODIUM CHLORIDE, SODIUM LACTATE, POTASSIUM CHLORIDE, CALCIUM CHLORIDE 600; 310; 30; 20 MG/100ML; MG/100ML; MG/100ML; MG/100ML
INJECTION, SOLUTION INTRAVENOUS CONTINUOUS
Status: DISCONTINUED | OUTPATIENT
Start: 2019-11-27 | End: 2019-11-28 | Stop reason: HOSPADM

## 2019-11-27 RX ORDER — KETOROLAC TROMETHAMINE 30 MG/ML
30 INJECTION, SOLUTION INTRAMUSCULAR; INTRAVENOUS
Status: CANCELLED
Start: 2019-11-27

## 2019-11-27 RX ORDER — ONDANSETRON 4 MG/1
4 TABLET, ORALLY DISINTEGRATING ORAL EVERY 30 MIN PRN
Status: DISCONTINUED | OUTPATIENT
Start: 2019-11-27 | End: 2019-11-28 | Stop reason: HOSPADM

## 2019-11-27 RX ORDER — ONDANSETRON 2 MG/ML
4 INJECTION INTRAMUSCULAR; INTRAVENOUS EVERY 30 MIN PRN
Status: DISCONTINUED | OUTPATIENT
Start: 2019-11-27 | End: 2019-11-28 | Stop reason: HOSPADM

## 2019-11-27 RX ORDER — FENTANYL CITRATE 50 UG/ML
25-50 INJECTION, SOLUTION INTRAMUSCULAR; INTRAVENOUS
Status: DISCONTINUED | OUTPATIENT
Start: 2019-11-27 | End: 2019-11-28 | Stop reason: HOSPADM

## 2019-11-27 RX ORDER — NORTRIPTYLINE HCL 25 MG
50 CAPSULE ORAL AT BEDTIME
Qty: 30 CAPSULE | Refills: 0 | Status: SHIPPED | OUTPATIENT
Start: 2019-11-27 | End: 2019-12-13 | Stop reason: DRUGHIGH

## 2019-11-27 RX ORDER — NALOXONE HYDROCHLORIDE 0.4 MG/ML
.1-.4 INJECTION, SOLUTION INTRAMUSCULAR; INTRAVENOUS; SUBCUTANEOUS
Status: DISCONTINUED | OUTPATIENT
Start: 2019-11-27 | End: 2019-11-28 | Stop reason: HOSPADM

## 2019-11-27 RX ADMIN — LABETALOL HYDROCHLORIDE 10 MG: 5 INJECTION INTRAVENOUS at 08:22

## 2019-11-27 RX ADMIN — KETOROLAC TROMETHAMINE 30 MG: 30 INJECTION, SOLUTION INTRAMUSCULAR; INTRAVENOUS at 08:02

## 2019-11-27 RX ADMIN — Medication 90 MG: at 08:16

## 2019-11-27 RX ADMIN — METHOHEXITAL SODIUM 90 MG: 500 INJECTION, POWDER, LYOPHILIZED, FOR SOLUTION INTRAMUSCULAR; INTRAVENOUS; RECTAL at 08:14

## 2019-11-27 NOTE — ANESTHESIA PREPROCEDURE EVALUATION
Anesthesia Pre-Procedure Evaluation    Patient: Zuleyma Reed   MRN:     [id number] Gender:   female   Age:    49 year old :      1970        Preoperative Diagnosis: * No pre-op diagnosis entered *   * No procedures listed *     Past Medical History:   Diagnosis Date     Depression with anxiety       Past Surgical History:   Procedure Laterality Date     ORTHOPEDIC SURGERY                     PHYSICAL EXAM:   Mental Status/Neuro: A/A/O   Airway: Facies: Feasible  Mallampati: II  Mouth/Opening: Full  TM distance: > 6 cm  Neck ROM: Full   Respiratory: Auscultation: CTAB     Resp. Rate: Normal     Resp. Effort: Normal      CV: Rhythm: Regular  Rate: Age appropriate  Heart: Normal Sounds  Edema: None   Comments:      Dental: Normal Dentition                LABS:  CBC:   Lab Results   Component Value Date    WBC 9.3 10/23/2019    WBC 8.2 2019    HGB 15.0 10/23/2019    HGB 15.7 2019    HCT 45.1 10/23/2019    HCT 46.4 2019     10/23/2019     2019     BMP:   Lab Results   Component Value Date     10/23/2019     2019    POTASSIUM 4.1 10/23/2019    POTASSIUM 4.1 2019    CHLORIDE 107 10/23/2019    CHLORIDE 109 2019    CO2 27 10/23/2019    CO2 28 2019    BUN 11 10/23/2019    BUN 10 2019    CR 0.85 10/23/2019    CR 0.92 2019    GLC 96 10/23/2019     (H) 2019     COAGS: No results found for: PTT, INR, FIBR  POC:   Lab Results   Component Value Date    HCG Negative 10/24/2019     OTHER:   Lab Results   Component Value Date    SHANA 8.5 10/23/2019    MAG 2.2 2006    ALBUMIN 3.6 10/23/2019    PROTTOTAL 7.1 10/23/2019    ALT 20 10/23/2019    AST 8 10/23/2019    ALKPHOS 61 10/23/2019    BILITOTAL 0.9 10/23/2019    LIPASE 65 2006    TSH 3.34 10/23/2019        Preop Vitals    BP Readings from Last 3 Encounters:   19 (!) 148/92   19 (!) (P) 144/88   19 (!) 129/108    Pulse Readings from Last 3  "Encounters:   11/18/19 91   11/06/19 80   11/04/19 97      Resp Readings from Last 3 Encounters:   11/27/19 16   11/25/19 18   11/22/19 12    SpO2 Readings from Last 3 Encounters:   11/27/19 100%   11/25/19 95%   11/22/19 95%      Temp Readings from Last 1 Encounters:   11/27/19 36.9  C (98.5  F) (Tympanic)    Ht Readings from Last 1 Encounters:   10/22/19 1.74 m (5' 8.5\")      Wt Readings from Last 1 Encounters:   10/31/19 90.8 kg (200 lb 1.6 oz)    Estimated body mass index is 29.98 kg/m  as calculated from the following:    Height as of 10/22/19: 1.74 m (5' 8.5\").    Weight as of 10/31/19: 90.8 kg (200 lb 1.6 oz).     LDA:  Peripheral IV 11/27/19 Right Upper arm (Active)   Site Assessment WDL 11/27/2019  8:02 AM   Line Status Saline locked 11/27/2019  8:02 AM   Phlebitis Scale 0-->no symptoms 11/27/2019  8:02 AM   Infiltration Scale 0 11/27/2019  8:02 AM   Infiltration Site Treatment Method  None 11/27/2019  8:02 AM   Extravasation? No 11/27/2019  8:02 AM   Dressing Intervention New dressing  11/27/2019  8:02 AM   Reason Not Rotated Anticipated discharge 11/27/2019  8:02 AM   Number of days: 0        Assessment:   ASA SCORE: 3            Plan:   Anes. Type:  General   Pre-Medication: None   Induction:  IV (Standard)   Airway: Mask   Access/Monitoring: PIV   Maintenance: N/a     Postop Plan:   Postop Pain: Opioids  Postop Sedation/Airway: Not planned     PONV Management:   Adult Risk Factors: Female, Postop Opioids                   Raymond Webster,   "

## 2019-11-27 NOTE — IP AVS SNAPSHOT
[facility]  [facility] [address] [address]  Phone:  [phone]                                    After Visit Summary   11/27/2019    Zuleyma Reed    MRN: [id number]           After Visit Summary Signature Page    I have received my discharge instructions, and my questions have been answered. I have discussed any challenges I see with this plan with the nurse or doctor.    ..........................................................................................................................................  Patient/Patient Representative Signature      ..........................................................................................................................................  Patient Representative Print Name and Relationship to Patient    ..................................................               ................................................  Date                                   Time    ..........................................................................................................................................  Reviewed by Signature/Title    ...................................................              ..............................................  Date                                               Time          22EPIC Rev 08/18

## 2019-11-27 NOTE — DISCHARGE INSTRUCTIONS
ECT Discharge Instructions      During your ECT series:      Do not drive for 24 hours after treatment. If you will have more than one treatment within a week, no driving until 7 days after your last ECT treatment.     Do not drink alcohol or use street drugs (illicit drugs) while you are having treatments.    Do not make important decisions, including legal decisions.    After each treatment:      Get plenty of rest. A responsible adult must stay with your for at least 6 hours.    Avoid heavy or risky activities for 24 hours.    If you feel light-headed, sit for a few minutes before standing. Have someone help you get up.    If you have nausea (feel sick to your stomach): Drink only clear liquids such as apple juice, ginger ale, broth or 7UP, Be sure to drink plenty of liquids. Move to a normal diet as you feel able.     If you received Toradol, wait 6 hours before taking ibuprofen.    Call your doctor if:     You have a fever over 100F (37.7 C) (taken under the tongue), or a fever that last more than 24 hours.    Your IV site is red, swollen, very painful or is getting more tender.    You have nausea that gets very bad or does not improve.      If you have any symptoms after ECT, tell our staff before your next treatment.    The ECT Department can be reached at [phone].  The ECT Department is open Mondays, Wednesdays and Fridays from 7:00 AM to 2:00 PM.      To speak to a doctor, call:Please call Dr. Newton at [phone]         Other:     You had Toradol at 8am. Which is an NSAID medication. Do not take any Ibuprofen, Excedrin, Motrin, Aleve, Advil, Asprin, or any other NSAID medication until after 2pm . Questions, check with your physician or pharmacist.      Take 1/2 tab Viibryd at bedtime for 2-3 more nights, then stop  Continuenortriptyline 50 mg QHS  Strongly encouraged to walk or exercise regularly  Patient advised to keep a journal or serial video record of her progress through outpt. ECT. Encouraged  more contact with family

## 2019-11-27 NOTE — ANESTHESIA POSTPROCEDURE EVALUATION
Anesthesia POST Procedure Evaluation    Patient: Zuleyma Reed   MRN:     [id number] Gender:   female   Age:    49 year old :      1970        Preoperative Diagnosis: * No pre-op diagnosis entered *   * No procedures listed *   Postop Comments: No value filed.       Anesthesia Type:  Not documented  General    Reportable Event: NO     PAIN: Uncomplicated   Sign Out status: Comfortable, Well controlled pain     PONV: No PONV   Sign Out status:  No Nausea or Vomiting     Neuro/Psych: Uneventful perioperative course   Sign Out Status: Preoperative baseline; Age appropriate mentation     Airway/Resp.: Uneventful perioperative course   Sign Out Status: Non labored breathing, age appropriate RR; Resp. Status within EXPECTED Parameters     CV: Uneventful perioperative course   Sign Out status: Appropriate BP and perfusion indices; Appropriate HR/Rhythm     Disposition:   Sign Out in:  PACU  Disposition:  Phase II; Home  Recovery Course: Uneventful  Follow-Up: Not required           Last Anesthesia Record Vitals:  CRNA VITALS  2019 0753 - 2019 0825      2019             Pulse:  97    Ht Rate:  99    SpO2:  97 %    Resp Rate (set):  8          Last PACU Vitals:  Vitals Value Taken Time   BP     Temp     Pulse     Resp     SpO2     Temp src     NIBP 171/97 2019  8:23 AM   Pulse 97 2019  8:24 AM   SpO2 97 % 2019  8:24 AM   Resp     Temp     Ht Rate 99 2019  8:24 AM   Temp 2           Electronically Signed By: Raymond Webster DO, 2019, 8:25 AM
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
## 2019-12-02 ENCOUNTER — ANESTHESIA (OUTPATIENT)
Dept: BEHAVIORAL HEALTH | Facility: CLINIC | Age: 49
End: 2019-12-02

## 2019-12-02 ENCOUNTER — ANESTHESIA EVENT (OUTPATIENT)
Dept: BEHAVIORAL HEALTH | Facility: CLINIC | Age: 49
End: 2019-12-02

## 2019-12-02 ENCOUNTER — HOSPITAL ENCOUNTER (OUTPATIENT)
Dept: BEHAVIORAL HEALTH | Facility: CLINIC | Age: 49
Discharge: HOME | End: 2019-12-02
Attending: PSYCHIATRY & NEUROLOGY | Admitting: PSYCHIATRY & NEUROLOGY
Payer: COMMERCIAL

## 2019-12-02 VITALS
RESPIRATION RATE: 14 BRPM | DIASTOLIC BLOOD PRESSURE: 114 MMHG | TEMPERATURE: 98.6 F | HEART RATE: 97 BPM | SYSTOLIC BLOOD PRESSURE: 132 MMHG | OXYGEN SATURATION: 98 %

## 2019-12-02 DIAGNOSIS — F33.2 SEVERE EPISODE OF RECURRENT MAJOR DEPRESSIVE DISORDER, WITHOUT PSYCHOTIC FEATURES (H): Primary | ICD-10-CM

## 2019-12-02 PROCEDURE — 25000128 H RX IP 250 OP 636: Performed by: PSYCHIATRY & NEUROLOGY

## 2019-12-02 PROCEDURE — 37000008 ZZH ANESTHESIA TECHNICAL FEE, 1ST 30 MIN

## 2019-12-02 PROCEDURE — 25000128 H RX IP 250 OP 636: Performed by: STUDENT IN AN ORGANIZED HEALTH CARE EDUCATION/TRAINING PROGRAM

## 2019-12-02 PROCEDURE — 90870 ELECTROCONVULSIVE THERAPY: CPT

## 2019-12-02 PROCEDURE — 25000125 ZZHC RX 250: Performed by: STUDENT IN AN ORGANIZED HEALTH CARE EDUCATION/TRAINING PROGRAM

## 2019-12-02 RX ORDER — KETOROLAC TROMETHAMINE 30 MG/ML
30 INJECTION, SOLUTION INTRAMUSCULAR; INTRAVENOUS
Status: CANCELLED
Start: 2019-12-02

## 2019-12-02 RX ORDER — KETOROLAC TROMETHAMINE 30 MG/ML
30 INJECTION, SOLUTION INTRAMUSCULAR; INTRAVENOUS
Status: COMPLETED | OUTPATIENT
Start: 2019-12-02 | End: 2019-12-02

## 2019-12-02 RX ADMIN — KETOROLAC TROMETHAMINE 30 MG: 30 INJECTION, SOLUTION INTRAMUSCULAR; INTRAVENOUS at 11:00

## 2019-12-02 RX ADMIN — METHOHEXITAL SODIUM 90 MG: 500 INJECTION, POWDER, LYOPHILIZED, FOR SOLUTION INTRAMUSCULAR; INTRAVENOUS; RECTAL at 11:03

## 2019-12-02 RX ADMIN — Medication 90 MG: at 11:03

## 2019-12-02 NOTE — ANESTHESIA POSTPROCEDURE EVALUATION
Anesthesia POST Procedure Evaluation    Patient: Zuleyma Reed   MRN:     [id number] Gender:   female   Age:    49 year old :      1970        Preoperative Diagnosis: * No pre-op diagnosis entered *   * No procedures listed *   Postop Comments: No value filed.       Anesthesia Type:  Not documented  General    Reportable Event: NO     PAIN: Uncomplicated   Sign Out status: Comfortable, Well controlled pain     PONV: No PONV   Sign Out status:  No Nausea or Vomiting     Neuro/Psych: Uneventful perioperative course   Sign Out Status: Preoperative baseline; Age appropriate mentation     Airway/Resp.: Uneventful perioperative course   Sign Out Status: Non labored breathing, age appropriate RR; Resp. Status within EXPECTED Parameters     CV: Uneventful perioperative course   Sign Out status: Appropriate BP and perfusion indices; Appropriate HR/Rhythm     Disposition:   Sign Out in:  PACU  Disposition:  Phase II; Home  Recovery Course: Uneventful  Follow-Up: Not required           Last Anesthesia Record Vitals:  CRNA VITALS  2019 1045 - 2019 1126      2019             Pulse:  106    Ht Rate:  105    SpO2:  100 %    Resp Rate (set):  8          Last PACU Vitals:  Vitals Value Taken Time   /94 2019 11:15 AM   Temp 37.1  C (98.8  F) 2019 11:15 AM   Pulse 107 2019 11:15 AM   Resp 16 2019 11:15 AM   SpO2 98 % 2019 11:15 AM   Temp src     NIBP 144/105 2019 11:14 AM   Pulse 106 2019 11:15 AM   SpO2 100 % 2019 11:15 AM   Resp     Temp     Ht Rate 105 2019 11:15 AM   Temp 2           Electronically Signed By: Graham Heath MD, 2019, 11:26 AM

## 2019-12-02 NOTE — DISCHARGE INSTRUCTIONS
ECT Discharge Instructions      During your ECT series:      Do not drive for 24 hours after treatment. If you will have more than one treatment within a week, no driving until 7 days after your last ECT treatment.     Do not drink alcohol or use street drugs (illicit drugs) while you are having treatments.    Do not make important decisions, including legal decisions.    After each treatment:      Get plenty of rest. A responsible adult must stay with your for at least 6 hours.    Avoid heavy or risky activities for 24 hours.    If you feel light-headed, sit for a few minutes before standing. Have someone help you get up.    If you have nausea (feel sick to your stomach): Drink only clear liquids such as apple juice, ginger ale, broth or 7UP, Be sure to drink plenty of liquids. Move to a normal diet as you feel able.     If you received Toradol, wait 6 hours before taking ibuprofen.    Call your doctor if:     You have a fever over 100F (37.7 C) (taken under the tongue), or a fever that last more than 24 hours.    Your IV site is red, swollen, very painful or is getting more tender.    You have nausea that gets very bad or does not improve.      If you have any symptoms after ECT, tell our staff before your next treatment.    The ECT Department can be reached at [phone].  The ECT Department is open Mondays, Wednesdays and Fridays from 7:00 AM to 2:00 PM.      To speak to a doctor, call:Please call Dr. Newton at [phone]         Other:     You had Toradol at 11am. Which is an NSAID medication. Do not take any Ibuprofen, Excedrin, Motrin, Aleve, Advil, Asprin, or any other NSAID medication until after 5pm . Questions, check with your physician or pharmacist    Call therapist to make appointment ASAP to work on socialization issues, should see Nurse Practitioner in clinic next week to start Lithium  Continue nortriptyline 50 mg at bedtime, should get level done on Wed. after ECT  Strongly encouraged to walk or  exercise regularly  Patient advised to keep a journal or serial video record of her progress through outpt. ECT.  Encouraged more contact with family

## 2019-12-02 NOTE — IP AVS SNAPSHOT
[facility]  [facility] [address] [address]  Phone:  [phone]                                    After Visit Summary   12/2/2019    Zuleyma Reed    MRN: [id number]           After Visit Summary Signature Page    I have received my discharge instructions, and my questions have been answered. I have discussed any challenges I see with this plan with the nurse or doctor.    ..........................................................................................................................................  Patient/Patient Representative Signature      ..........................................................................................................................................  Patient Representative Print Name and Relationship to Patient    ..................................................               ................................................  Date                                   Time    ..........................................................................................................................................  Reviewed by Signature/Title    ...................................................              ..............................................  Date                                               Time          22EPIC Rev 08/18
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
## 2019-12-02 NOTE — ANESTHESIA PREPROCEDURE EVALUATION
Anesthesia Pre-Procedure Evaluation    Patient: Zuleyma Reed   MRN:     [id number] Gender:   female   Age:    49 year old :      1970        Preoperative Diagnosis: * No pre-op diagnosis entered *   * No procedures listed *     Past Medical History:   Diagnosis Date     Depression with anxiety       Past Surgical History:   Procedure Laterality Date     ORTHOPEDIC SURGERY                       PHYSICAL EXAM:   Mental Status/Neuro: A/A/O   Airway: Facies: Feasible  Mallampati: II  Mouth/Opening: Full  TM distance: > 6 cm  Neck ROM: Full   Respiratory: Auscultation: CTAB     Resp. Rate: Normal     Resp. Effort: Normal      CV: Rhythm: Regular  Rate: Age appropriate  Heart: Normal Sounds  Edema: None   Comments:      Dental: Normal Dentition                LABS:  CBC:   Lab Results   Component Value Date    WBC 9.3 10/23/2019    WBC 8.2 2019    HGB 15.0 10/23/2019    HGB 15.7 2019    HCT 45.1 10/23/2019    HCT 46.4 2019     10/23/2019     2019     BMP:   Lab Results   Component Value Date     10/23/2019     2019    POTASSIUM 4.1 10/23/2019    POTASSIUM 4.1 2019    CHLORIDE 107 10/23/2019    CHLORIDE 109 2019    CO2 27 10/23/2019    CO2 28 2019    BUN 11 10/23/2019    BUN 10 2019    CR 0.85 10/23/2019    CR 0.92 2019    GLC 96 10/23/2019     (H) 2019     COAGS: No results found for: PTT, INR, FIBR  POC:   Lab Results   Component Value Date    HCG Negative 10/24/2019     OTHER:   Lab Results   Component Value Date    SHANA 8.5 10/23/2019    MAG 2.2 2006    ALBUMIN 3.6 10/23/2019    PROTTOTAL 7.1 10/23/2019    ALT 20 10/23/2019    AST 8 10/23/2019    ALKPHOS 61 10/23/2019    BILITOTAL 0.9 10/23/2019    LIPASE 65 2006    TSH 3.34 10/23/2019        Preop Vitals    BP Readings from Last 3 Encounters:   19 (!) 137/94   19 (!) (P) 144/88   19 (!) 129/108    Pulse Readings from Last 3  "Encounters:   11/18/19 91   11/06/19 80   11/04/19 97      Resp Readings from Last 3 Encounters:   11/27/19 20   11/25/19 18   11/22/19 12    SpO2 Readings from Last 3 Encounters:   11/27/19 99%   11/25/19 95%   11/22/19 95%      Temp Readings from Last 1 Encounters:   11/27/19 37  C (98.6  F) (Temporal)    Ht Readings from Last 1 Encounters:   10/22/19 1.74 m (5' 8.5\")      Wt Readings from Last 1 Encounters:   10/31/19 90.8 kg (200 lb 1.6 oz)    Estimated body mass index is 29.98 kg/m  as calculated from the following:    Height as of 10/22/19: 1.74 m (5' 8.5\").    Weight as of 10/31/19: 90.8 kg (200 lb 1.6 oz).     LDA:  Peripheral IV 11/27/19 Right Upper arm (Active)   Number of days: 5        Assessment:   ASA SCORE: 3            Plan:   Anes. Type:  General   Pre-Medication: None   Induction:  IV (Standard)   Airway: Mask   Access/Monitoring: PIV   Maintenance: N/a     Postop Plan:   Postop Pain: Opioids  Postop Sedation/Airway: Not planned     PONV Management:   Adult Risk Factors: Female, Postop Opioids                       Graham Heath MD  "

## 2019-12-03 ENCOUNTER — DOCUMENTATION ONLY (OUTPATIENT)
Dept: PSYCHIATRY | Facility: CLINIC | Age: 49
End: 2019-12-03

## 2019-12-03 ENCOUNTER — TELEPHONE (OUTPATIENT)
Dept: PSYCHIATRY | Facility: CLINIC | Age: 49
End: 2019-12-03

## 2019-12-03 DIAGNOSIS — F33.2 SEVERE RECURRENT MAJOR DEPRESSION WITHOUT PSYCHOTIC FEATURES (H): Primary | ICD-10-CM

## 2019-12-03 NOTE — PROGRESS NOTES
Received disability form to determine capacity.  However, this writer only saw pt on 10/22/2019 once and she was admitted directly from the clinic.  Pt has not followed up with this writer since.  Discussed with nursing staff that I am unable to provide accurate current capacity assessment and this documentation should be filled by her ECT provider. Crystal Reece, THI, 12/3/2019

## 2019-12-03 NOTE — TELEPHONE ENCOUNTER
On 12/3/2019, 5 faxed pages of forms, including RAINA, was received from [facility] requesting a Behavioral Health Capacity Questionnaire be completed. The original copies were put in Buck Mason's folder. A message was routed to Stephanie Amin LPN

## 2019-12-04 ENCOUNTER — ANESTHESIA (OUTPATIENT)
Dept: BEHAVIORAL HEALTH | Facility: CLINIC | Age: 49
End: 2019-12-04

## 2019-12-04 ENCOUNTER — ANESTHESIA EVENT (OUTPATIENT)
Dept: BEHAVIORAL HEALTH | Facility: CLINIC | Age: 49
End: 2019-12-04

## 2019-12-04 ENCOUNTER — HOSPITAL ENCOUNTER (OUTPATIENT)
Dept: BEHAVIORAL HEALTH | Facility: CLINIC | Age: 49
Discharge: HOME | End: 2019-12-04
Attending: PSYCHIATRY & NEUROLOGY | Admitting: PSYCHIATRY & NEUROLOGY
Payer: COMMERCIAL

## 2019-12-04 VITALS
TEMPERATURE: 98.6 F | HEART RATE: 118 BPM | OXYGEN SATURATION: 95 % | SYSTOLIC BLOOD PRESSURE: 143 MMHG | RESPIRATION RATE: 14 BRPM | DIASTOLIC BLOOD PRESSURE: 92 MMHG

## 2019-12-04 DIAGNOSIS — F33.2 SEVERE RECURRENT MAJOR DEPRESSION WITHOUT PSYCHOTIC FEATURES (H): ICD-10-CM

## 2019-12-04 DIAGNOSIS — F33.2 SEVERE EPISODE OF RECURRENT MAJOR DEPRESSIVE DISORDER, WITHOUT PSYCHOTIC FEATURES (H): Primary | ICD-10-CM

## 2019-12-04 PROCEDURE — 90870 ELECTROCONVULSIVE THERAPY: CPT

## 2019-12-04 PROCEDURE — 25000125 ZZHC RX 250: Performed by: STUDENT IN AN ORGANIZED HEALTH CARE EDUCATION/TRAINING PROGRAM

## 2019-12-04 PROCEDURE — 25000128 H RX IP 250 OP 636: Performed by: STUDENT IN AN ORGANIZED HEALTH CARE EDUCATION/TRAINING PROGRAM

## 2019-12-04 PROCEDURE — 80335 ANTIDEPRESSANT TRICYCLIC 1/2: CPT | Performed by: PSYCHIATRY & NEUROLOGY

## 2019-12-04 PROCEDURE — 37000008 ZZH ANESTHESIA TECHNICAL FEE, 1ST 30 MIN

## 2019-12-04 PROCEDURE — 36415 COLL VENOUS BLD VENIPUNCTURE: CPT | Performed by: PSYCHIATRY & NEUROLOGY

## 2019-12-04 PROCEDURE — 25000128 H RX IP 250 OP 636: Performed by: PSYCHIATRY & NEUROLOGY

## 2019-12-04 RX ORDER — KETOROLAC TROMETHAMINE 30 MG/ML
30 INJECTION, SOLUTION INTRAMUSCULAR; INTRAVENOUS
Status: COMPLETED | OUTPATIENT
Start: 2019-12-04 | End: 2019-12-04

## 2019-12-04 RX ORDER — KETOROLAC TROMETHAMINE 30 MG/ML
30 INJECTION, SOLUTION INTRAMUSCULAR; INTRAVENOUS
Status: CANCELLED
Start: 2019-12-04

## 2019-12-04 RX ADMIN — METHOHEXITAL SODIUM 90 MG: 500 INJECTION, POWDER, LYOPHILIZED, FOR SOLUTION INTRAMUSCULAR; INTRAVENOUS; RECTAL at 09:19

## 2019-12-04 RX ADMIN — KETOROLAC TROMETHAMINE 30 MG: 30 INJECTION, SOLUTION INTRAMUSCULAR; INTRAVENOUS at 09:11

## 2019-12-04 RX ADMIN — Medication 90 MG: at 09:19

## 2019-12-04 NOTE — IP AVS SNAPSHOT
[facility]  [facility] [address] [facility] [address]  Phone:  [phone]                                    After Visit Summary   12/4/2019    Zuleyma Reed    MRN: [id number]           After Visit Summary Signature Page    I have received my discharge instructions, and my questions have been answered. I have discussed any challenges I see with this plan with the nurse or doctor.    ..........................................................................................................................................  Patient/Patient Representative Signature      ..........................................................................................................................................  Patient Representative Print Name and Relationship to Patient    ..................................................               ................................................  Date                                   Time    ..........................................................................................................................................  Reviewed by Signature/Title    ...................................................              ..............................................  Date                                               Time          22EPIC Rev 08/18
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
## 2019-12-04 NOTE — DISCHARGE INSTRUCTIONS
ECT Discharge Instructions      During your ECT series:      Do not drive for 24 hours after treatment. If you will have more than one treatment within a week, no driving until 7 days after your last ECT treatment.     Do not drink alcohol or use street drugs (illicit drugs) while you are having treatments.    Do not make important decisions, including legal decisions.    After each treatment:      Get plenty of rest. A responsible adult must stay with your for at least 6 hours.    Avoid heavy or risky activities for 24 hours.    If you feel light-headed, sit for a few minutes before standing. Have someone help you get up.    If you have nausea (feel sick to your stomach): Drink only clear liquids such as apple juice, ginger ale, broth or 7UP, Be sure to drink plenty of liquids. Move to a normal diet as you feel able.     If you received Toradol, wait 6 hours before taking ibuprofen.    Call your doctor if:     You have a fever over 100F (37.7 C) (taken under the tongue), or a fever that last more than 24 hours.    Your IV site is red, swollen, very painful or is getting more tender.    You have nausea that gets very bad or does not improve.      If you have any symptoms after ECT, tell our staff before your next treatment.    The ECT Department can be reached at [phone].  The ECT Department is open Mondays, Wednesdays and Fridays from 7:00 AM to 2:00 PM.      To speak to a doctor, call:    Other: Please call Dr. Newton at [phone]     You had Toradol at 0910am which is an NSAID medication. Do not take any Ibuprofen, Excedrin, Motrin, Aleve, Advil, Asprin, or any other NSAID medication until after 310pm. Questions, check with your physician or pharmacist.      Plan:   Discontinue ECT, contact us or clinic provider as soon as possible if depression retrns  Call therapist to make appointment ASAP to work on socialization issues, should see PALAK Reece in clinic next week to start Li+  Continue nortriptyline 50  mg QHS, should get level done on Wed.   Strongly encouraged to walk or exercise regularly     Dmitry Newton MD  Dept. of Psychiatry   [facility]

## 2019-12-04 NOTE — IP AVS SNAPSHOT
MRN:[id number]                      After Visit Summary   12/4/2019    Zuleyma Reed    MRN: [id number]           Visit Information        Provider Department      12/4/2019  8:00 AM Dmitry Newton MD [facility]           Review of your medicines      CONTINUE these medicines which have NOT CHANGED       Dose / Directions   acetaminophen 325 MG tablet  Commonly known as:  TYLENOL      Dose:  975 mg  Take 975 mg by mouth every 6 hours as needed for mild pain  Refills:  0     aspirin-acetaminophen-caffeine 250-250-65 MG tablet  Commonly known as:  EXCEDRIN MIGRAINE      Dose:  1 tablet  Take 1 tablet by mouth every 6 hours as needed for headaches  Refills:  0     cyclobenzaprine 5 MG tablet  Commonly known as:  FLEXERIL  Used for:  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      Dose:  5-10 mg  Take 1-2 tablets (5-10 mg) by mouth 3 times daily as needed for muscle spasms (severe pain)  Quantity:  90 tablet  Refills:  0     nortriptyline 25 MG capsule  Commonly known as:  PAMELOR  Used for:  Severe recurrent major depression without psychotic features (H)      Dose:  50 mg  Take 2 capsules (50 mg) by mouth At Bedtime  Quantity:  30 capsule  Refills:  0     sodium chloride 0.65 % nasal spray  Commonly known as:  OCEAN  Used for:  Seasonal allergic rhinitis due to pollen      Dose:  1 spray  Spray 1 spray into both nostrils every hour as needed for congestion  Quantity:  1 Bottle  Refills:  0     traZODone 50 MG tablet  Commonly known as:  DESYREL  Used for:  Severe episode of recurrent major depressive disorder, without psychotic features (H)      Dose:  50 mg  Take 1 tablet (50 mg) by mouth nightly as needed for sleep  Quantity:  30 tablet  Refills:  0              Protect others around you: Learn how to safely use, store and throw away your medicines at www.disposemymeds.org.       Follow-ups after your visit       Your next 10 appointments already scheduled     Dec 24, 2019 10:00 AM [facility]  Adult Med Follow UP with BERTRAM [facility]  Psychiatry Clinic ([facility]) [place] [id number]  [address] [phone]         Care Instructions       Further instructions from your care team       ECT Discharge Instructions      During your ECT series:      Do not drive for 24 hours after treatment. If you will have more than one treatment within a week, no driving until 7 days after your last ECT treatment.     Do not drink alcohol or use street drugs (illicit drugs) while you are having treatments.    Do not make important decisions, including legal decisions.    After each treatment:      Get plenty of rest. A responsible adult must stay with your for at least 6 hours.    Avoid heavy or risky activities for 24 hours.    If you feel light-headed, sit for a few minutes before standing. Have someone help you get up.    If you have nausea (feel sick to your stomach): Drink only clear liquids such as apple juice, ginger ale, broth or 7UP, Be sure to drink plenty of liquids. Move to a normal diet as you feel able.     If you received Toradol, wait 6 hours before taking ibuprofen.    Call your doctor if:     You have a fever over 100F (37.7 C) (taken under the tongue), or a fever that last more than 24 hours.    Your IV site is red, swollen, very painful or is getting more tender.    You have nausea that gets very bad or does not improve.      If you have any symptoms after ECT, tell our staff before your next treatment.    The ECT Department can be reached at [phone].  The ECT Department is open Mondays, Wednesdays and Fridays from 7:00 AM to 2:00 PM.      To speak to a doctor, call:    Other: Please call Dr. Newton at [phone]     You had Toradol at 0910am which is an NSAID medication. Do not take any Ibuprofen, Excedrin, Motrin, Aleve, Advil, Asprin, or any other NSAID medication until after 310pm.  Questions, check with your physician or pharmacist.      Plan:   Discontinue ECT, contact us or clinic provider as soon as possible if depression retrns  Call therapist to make appointment ASAP to work on socialization issues, should see NP Chevy in clinic next week to start Li+  Continue nortriptyline 50 mg QHS, should get level done on Wed.   Strongly encouraged to walk or exercise regularly     Dmitry Newton MD  Dept. of Psychiatry   [facility]                      Additional Information About Your Visit       MyChart Information    [employer] gives you secure access to your electronic health record. If you see a primary care provider, you can also send messages to your care team and make appointments. If you have questions, please call your primary care clinic.  If you do not have a primary care provider, please call [phone] and they will assist you.       Care EveryWhere ID    This is your Care EveryWhere ID. This could be used by other organizations to access your [facility] medical records  [id number]       Your Vitals Were  Most recent update: 12/4/2019  9:40 AM    Blood Pressure   163/98      Pulse   118    Temperature   99  F (37.2  C) (Temporal)    Respirations   15    Pulse Oximetry   95%          Primary Care Provider Office Phone # Fax #    Hannah Arias Keith, APRN [facility] [phone]      Equal Access to Services    STACEY MARTINEZ : Hadii mary jama hadasho Soomaali, waaxda luqadaha, qaybta kaalmada adeegyada, seema alvarez. So [facility] [phone].    ATENCIÓN: Si habla español, tiene a jeff disposición servicios gratuitos de asistencia lingüística. Llame al [phone].    We comply with applicable federal and state civil rights laws, including the [place] Human Rights Act. We do not discriminate on the basis of race, color, creed, Buddhism, national origin, marital status, age, disability, sex, sexual orientation, or gender identity.       Thank  you!    Thank you for choosing [facility] for your care. Our goal is always to provide you with excellent care. Hearing back from our patients is one way we can continue to improve our services. Please take a few minutes to complete the written survey that you may receive in the mail after you visit with us. Thank you!            Medication List      Medications       Morning Afternoon Evening Bedtime As Needed   acetaminophen 325 MG tablet  Also known as:  TYLENOL  INSTRUCTIONS:  Take 975 mg by mouth every 6 hours as needed for mild pain                    aspirin-acetaminophen-caffeine 250-250-65 MG tablet  Also known as:  EXCEDRIN MIGRAINE  INSTRUCTIONS:  Take 1 tablet by mouth every 6 hours as needed for headaches                    cyclobenzaprine 5 MG tablet  Also known as:  FLEXERIL  INSTRUCTIONS:  Take 1-2 tablets (5-10 mg) by mouth 3 times daily as needed for muscle spasms (severe pain)                    nortriptyline 25 MG capsule  Also known as:  PAMELOR  INSTRUCTIONS:  Take 2 capsules (50 mg) by mouth At Bedtime                    sodium chloride 0.65 % nasal spray  Also known as:  OCEAN  INSTRUCTIONS:  Spray 1 spray into both nostrils every hour as needed for congestion                    traZODone 50 MG tablet  Also known as:  DESYREL  INSTRUCTIONS:  Take 1 tablet (50 mg) by mouth nightly as needed for sleep

## 2019-12-04 NOTE — ANESTHESIA PREPROCEDURE EVALUATION
Anesthesia Pre-Procedure Evaluation    Patient: Zuleyma Reed   MRN:     [id number] Gender:   female   Age:    49 year old :      1970        Preoperative Diagnosis: * No pre-op diagnosis entered *   * No procedures listed *     Past Medical History:   Diagnosis Date     Depression with anxiety       Past Surgical History:   Procedure Laterality Date     ORTHOPEDIC SURGERY                       PHYSICAL EXAM:   Mental Status/Neuro: A/A/O   Airway: Facies: Feasible  Mallampati: II  Mouth/Opening: Full  TM distance: > 6 cm  Neck ROM: Full   Respiratory: Auscultation: CTAB     Resp. Rate: Normal     Resp. Effort: Normal      CV: Rhythm: Regular  Rate: Age appropriate  Heart: Normal Sounds  Edema: None   Comments:      Dental: Normal Dentition                LABS:  CBC:   Lab Results   Component Value Date    WBC 9.3 10/23/2019    WBC 8.2 2019    HGB 15.0 10/23/2019    HGB 15.7 2019    HCT 45.1 10/23/2019    HCT 46.4 2019     10/23/2019     2019     BMP:   Lab Results   Component Value Date     10/23/2019     2019    POTASSIUM 4.1 10/23/2019    POTASSIUM 4.1 2019    CHLORIDE 107 10/23/2019    CHLORIDE 109 2019    CO2 27 10/23/2019    CO2 28 2019    BUN 11 10/23/2019    BUN 10 2019    CR 0.85 10/23/2019    CR 0.92 2019    GLC 96 10/23/2019     (H) 2019     COAGS: No results found for: PTT, INR, FIBR  POC:   Lab Results   Component Value Date    HCG Negative 10/24/2019     OTHER:   Lab Results   Component Value Date    SHANA 8.5 10/23/2019    MAG 2.2 2006    ALBUMIN 3.6 10/23/2019    PROTTOTAL 7.1 10/23/2019    ALT 20 10/23/2019    AST 8 10/23/2019    ALKPHOS 61 10/23/2019    BILITOTAL 0.9 10/23/2019    LIPASE 65 2006    TSH 3.34 10/23/2019        Preop Vitals    BP Readings from Last 3 Encounters:   19 (!) 132/114   19 (!) 137/94   19 (!) (P) 144/88    Pulse Readings from Last 3  "Encounters:   12/02/19 97   11/18/19 91   11/06/19 80      Resp Readings from Last 3 Encounters:   12/02/19 14   11/27/19 20   11/25/19 18    SpO2 Readings from Last 3 Encounters:   12/02/19 98%   11/27/19 99%   11/25/19 95%      Temp Readings from Last 1 Encounters:   12/02/19 37  C (98.6  F) (Temporal)    Ht Readings from Last 1 Encounters:   10/22/19 1.74 m (5' 8.5\")      Wt Readings from Last 1 Encounters:   10/31/19 90.8 kg (200 lb 1.6 oz)    Estimated body mass index is 29.98 kg/m  as calculated from the following:    Height as of 10/22/19: 1.74 m (5' 8.5\").    Weight as of 10/31/19: 90.8 kg (200 lb 1.6 oz).     LDA:  Peripheral IV 11/27/19 Right Upper arm (Active)   Number of days: 7        Assessment:   ASA SCORE: 3            Plan:   Anes. Type:  General   Pre-Medication: None   Induction:  IV (Standard)   Airway: Mask   Access/Monitoring: PIV   Maintenance: N/a     Postop Plan:   Postop Pain: Opioids  Postop Sedation/Airway: Not planned     PONV Management:   Adult Risk Factors: Female, Postop Opioids                       Graham Heath MD  "

## 2019-12-04 NOTE — ANESTHESIA POSTPROCEDURE EVALUATION
Anesthesia POST Procedure Evaluation    Patient: Zuleyma Reed   MRN:     [id number] Gender:   female   Age:    49 year old :      1970        Preoperative Diagnosis: * No pre-op diagnosis entered *   * No procedures listed *   Postop Comments: No value filed.       Anesthesia Type:  Not documented  General    Reportable Event: NO     PAIN: Uncomplicated   Sign Out status: Comfortable, Well controlled pain     PONV: No PONV   Sign Out status:  No Nausea or Vomiting     Neuro/Psych: Uneventful perioperative course   Sign Out Status: Preoperative baseline; Age appropriate mentation     Airway/Resp.: Uneventful perioperative course   Sign Out Status: Non labored breathing, age appropriate RR; Resp. Status within EXPECTED Parameters     CV: Uneventful perioperative course   Sign Out status: Appropriate BP and perfusion indices; Appropriate HR/Rhythm     Disposition:   Sign Out in:  PACU  Disposition:  Phase II; Home  Recovery Course: Uneventful  Follow-Up: Not required           Last Anesthesia Record Vitals:  CRNA VITALS  2019 0856 - 2019 0937      2019             Pulse:  135    Ht Rate:  137    SpO2:  100 %    Resp Rate (set):  8          Last PACU Vitals:  Vitals Value Taken Time   /99 2019  9:27 AM   Temp 37.6  C (99.7  F) 2019  9:27 AM   Pulse 118 2019  9:27 AM   Resp 20 2019  9:27 AM   SpO2 94 % 2019  9:27 AM   Temp src     NIBP 222/133 2019  9:26 AM   Pulse 135 2019  9:27 AM   SpO2 100 % 2019  9:27 AM   Resp     Temp     Ht Rate 137 2019  9:27 AM   Temp 2           Electronically Signed By: Graham Heath MD, 2019, 9:37 AM

## 2019-12-04 NOTE — PROGRESS NOTES
Patients VSS, A/O, IV removed, meets phase 2 criteria and is able to move to discharge at this time.

## 2019-12-07 LAB — NORTRIP SERPL-MCNC: 34 NG/ML (ref 50–150)

## 2019-12-12 NOTE — TELEPHONE ENCOUNTER
On 12/11/19 felizr received completed form from Dr. Newton, who identified that if the pt is required to be seen prior to her 12/24 appointment, he could do that.    Faxed form, along with 12/4 ECT procedure note and 10/31 hospital discharge summary (per Dr. Newton's suggestion) to [facility] at [phone]. Routed to scanning. Copy held in 's clinic folder.    Felizr left voice mail message for pt, identifying that the fax was sent to [facility] and that Dr. Newton could see her if needed.

## 2019-12-13 ENCOUNTER — TELEPHONE (OUTPATIENT)
Dept: PSYCHIATRY | Facility: CLINIC | Age: 49
End: 2019-12-13

## 2019-12-13 DIAGNOSIS — F33.2 SEVERE EPISODE OF RECURRENT MAJOR DEPRESSIVE DISORDER, WITHOUT PSYCHOTIC FEATURES (H): Primary | ICD-10-CM

## 2019-12-13 DIAGNOSIS — F41.9 ANXIETY: ICD-10-CM

## 2019-12-13 NOTE — TELEPHONE ENCOUNTER
post-ECT patient   Received: Yesterday   Message Contents   Dmitry Newton MD Nakajima, Yukiko, BERTRAM CNP; Pily Gardner, RN             She told me she had taken Li+ in the past--maybe years ago and had problems with it (wt gain, didn't work, acne? don't quote me on that one), but also she was usually on Seroquel at the same time, so I told her 1) likely the quetiapine was more to blame for the weight gain, 2) wt. gain with Li+ often related to low-grade hypothyroidism, so we monitor and treat that accordingly 3) Li+ can be ineffective to reverse a severe depression and still help prevent relapse, and 4) possibly it would be effective against relapse at a much lower level than we would typically aim for in an acute episode, so side effects may be less.     I'll update you when we hear back from her,.     Ramy      Routed to Moody Valentin RN as an FYI

## 2019-12-16 RX ORDER — NORTRIPTYLINE HCL 25 MG
CAPSULE ORAL
Qty: 98 CAPSULE | Refills: 0 | Status: SHIPPED | OUTPATIENT
Start: 2019-12-16 | End: 2020-01-14

## 2019-12-16 NOTE — TELEPHONE ENCOUNTER
Message   Received: Today   Message Contents   Muhumed, Yasmin Patel, Radhika, RN             Pt called and said you had missed a call from you. Please call them back when you get the chance, they are available all day. Also this is Dr. Hawkins patient.     Thanks,     Yasmin Muhumed      Writer placed a call to patient at [phone] to relay nortriptyline lab results of 34. She reports taking medications regularly. Informed her to take Nortriptyline 75 mg X 2 weeks then increase to 100 mg daily until seen with Crystal. She agreed with the plan. She requested a refill be sent to [facility] [id number] [place] [facility] [address] Meds refilled per providers approval. Med tab changed to reflect this.     No further action needed by this writer

## 2019-12-16 NOTE — TELEPHONE ENCOUNTER
Patient/info Update     Dmitry Newton MD  You; Crystal Reece, BERTRAM CNP 3 days ago      Pily, he is please call and advise tbat her level was 34, should be between 50 and 150 ng/mL for best effect. Make sure she was sure she was taking it regularly during ECT when the test was done, if she was then  increase dose to 75 x 2 weeks then 100 mg but will see Crystal on 12/24 anyway.     Ramy Peck comment      Writer placed a call to patient at [phone] to relay the labs and providers recommendations. No answer at number provided. LVM, requesting a call back. Clinic number provided.

## 2019-12-20 ENCOUNTER — MEDICAL CORRESPONDENCE (OUTPATIENT)
Dept: HEALTH INFORMATION MANAGEMENT | Facility: CLINIC | Age: 49
End: 2019-12-20

## 2019-12-20 NOTE — TELEPHONE ENCOUNTER
Patient called to report [employer] is still waiting for this form. Writer told patient a follow up message will be sent to Dr. Newton's nurse since there is a note the form was faxed on 12/12/19. Patient would like an update when available and can be reached at [phone].

## 2019-12-20 NOTE — TELEPHONE ENCOUNTER
Writer called pt to gather more information. She said she called [employer] and they said they did not receive the forms, although, she was given money. Still, they asked that the forms are re-faxed. Writer resent these to [employer] at [phone].

## 2019-12-24 ENCOUNTER — OFFICE VISIT (OUTPATIENT)
Dept: PSYCHIATRY | Facility: CLINIC | Age: 49
End: 2019-12-24
Attending: NURSE PRACTITIONER
Payer: COMMERCIAL

## 2019-12-24 VITALS
BODY MASS INDEX: 31.02 KG/M2 | WEIGHT: 207 LBS | SYSTOLIC BLOOD PRESSURE: 144 MMHG | HEART RATE: 97 BPM | DIASTOLIC BLOOD PRESSURE: 97 MMHG

## 2019-12-24 DIAGNOSIS — F33.1 MDD (MAJOR DEPRESSIVE DISORDER), RECURRENT EPISODE, MODERATE (H): Primary | ICD-10-CM

## 2019-12-24 DIAGNOSIS — F41.9 ANXIETY: ICD-10-CM

## 2019-12-24 DIAGNOSIS — F43.10 PTSD (POST-TRAUMATIC STRESS DISORDER): ICD-10-CM

## 2019-12-24 PROCEDURE — G0463 HOSPITAL OUTPT CLINIC VISIT: HCPCS | Mod: ZF

## 2019-12-24 ASSESSMENT — PAIN SCALES - GENERAL: PAINLEVEL: MILD PAIN (3)

## 2019-12-24 NOTE — PROGRESS NOTES
Psychiatry Clinic Progress Note                                                                  Patient Name: Zuleyma Reed  YOB: 1970  MRN: [id number]  Date of Service:  12/24/2019  Last Seen:10/22/2019    Zuleyma Reed is a 49 year old female who uses the name Zuleyma and pronoun misbah.      Zuleyma Reed is a 49 year old year old adult who presents for ongoing psychiatric care.  Zuleyma Reed was last seen in clinic on 10/22/2019.     At that time,     Pt was accepted for inpatient admission today and no medication was ordered at outpatient.  Pt completed Genes[employer] testing today.  Will follow up according to inpatient discharge plan.     Medication Ordered/Consults/Labs/tests Ordered:      Medication: none today  OTC Recommendations: none  Lab Orders:  Genesight testing  Referrals: none  Release of Information: none  Future Treatment Considerations: per symptoms.   Return for Follow Up: per inpatient discharge plan      Pertinent Background:  Diagnoses include MDD, PTSD and anxiety.  Psych critical item history includes suicide attempt [multiple], mutiple psychotropic trials, trauma hx, psych hosp (3-5) and ECT.     Previous Psychiatric Meds: Celexa, Zoloft, Lexapro, Prozac, Paxil, Wellbutrin (vivid dream, not effective at 150 mg BID), Effexor, Cymbalta, Remeron, Trazodone and Ambien (oversedations with all medications), was prescribed Lithium once, but did not start.    Interim History                                                                                                        4, 4     Pt was hospitalized 10/22/2019-10/31/2019 at [facility] psychiatry, started on ECT and Viibryd was started and increased to 20 mg daily. On 11/22/2019, Dr Newton started Nortriptyline, but pt stopped Viibryd.  There was no documentation why Viibryd was stopped. 12/4/2019 was last ECT after 14 ECTs as pt wanted to stop it.  Dr Newton strongly recommended to start Lithium, but pt declined due to bad side  "effects in the past.  On 12/13/2019, her Nortriptyline level was 34 and she was instructed to take 75 mg daily x 2 weeks and increase to 100 mg daily.    Pt reports depression is better than since last seen, but not still has moderate depression.  Not showering daily, still feels unable to take care of herself until her daughter came to visit her few days ago.  Reports having difficulties to maintain asleep, sleep for 1-2 hours, wake up for about 30 min, then goes back to sleep last 3-4 months.  Middle awakening is not due to nocturia nor nightmares, just to wake up.  Not taking Trazodone as she does not think it works. Has been on Nortriptyline 75 mg x 4-5 days only.  Does not think notrtiptyline is making any improvement or changes, but stated \"maybe I feel that way because I don't have any side effects because all past meds gave me bad side effects.\"  Denies SI, SIB or HI.  Pt vehemently declines to be on lithium for the fear of the medication.  Denies SI, SIB or HI.  Pt also reports significant weight gain (12 lbs gain since last seen) and does not want to take any medications that increases her weight as she feels weight gain decreases her already low self-esteem and she is unable to exercise due to back pain from weight gain.    Pt also reports she has not returned to work and does not know when she is returning to work.  Pt reports there's no set date that she is aware of to return to work and also stated \"I don't have my position at work anymore, my position is being posted, so I am just casual, I don't know if I would have insurance after the end of the month.\"  Pt is concerned that she may have too much medical debt to continue any treatment.  Continues to see private practice therapist once a week near her home in [place] and does not think coming to this location for any further service may not be possible.    Denies any symptoms suggestive of hypomania or psychosis.    Current Suicidality/Hx of " Suicide Attempts: denies currently, hx of SA x 3, most recently 1/2019 by overdose  CoCominent Medical concerns: Reports back pain from weight gain    Medication Side Effects: The patient denies all medication side effects.      Medical Review of Systems     Apart from the symptoms mentioned int he HPI, the 14 point review of systems, including constitutional, HEENT, cardiovascular, respiratory, gastrointestinal, genitourinary, musculoskeletal, integumentary, endocrine, neurological, hematologic and allergic is entirely negative except back pain.    Pregnant: None. Nursing: None, Contraception: Mirena IUD, not currently sexually active    Substance Use   Pt has been staying substance free since last seen.      Medical / Surgical History                                                                                                                  Patient Active Problem List   Diagnosis     Depression with anxiety     Allergic rhinitis     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     Dysthymic disorder     Family history of autoimmune disorder     Generalized anxiety disorder     IUD (intrauterine device) in place     Major depressive disorder, recurrent episode (H)     Primary localized osteoarthrosis, lower leg     Tension type headache     MDD (major depressive disorder)     Suicidal ideation     Tobacco dependence syndrome       Past Surgical History:   Procedure Laterality Date     ORTHOPEDIC SURGERY          Social/ Family History                                  [per patient report]                                 1ea,1ea     Living arrangements: lives alone and feels safe  Social Support: daughter, M, F  Access to gun: denies    Allergy                                Patient has no known allergies.    Current Medications                                                                                                       Current Outpatient Medications   Medication Sig Dispense Refill      acetaminophen (TYLENOL) 325 MG tablet Take 975 mg by mouth every 6 hours as needed for mild pain       aspirin-acetaminophen-caffeine (EXCEDRIN MIGRAINE) 250-250-65 MG tablet Take 1 tablet by mouth every 6 hours as needed for headaches       nortriptyline (PAMELOR) 25 MG capsule Take 3 caps (75 mg) by mouth for 2 week then, After 2 weeks, if  tolerated, increse to 4 caps (100 mg) daily at bedtime 98 capsule 0     traZODone (DESYREL) 50 MG tablet Take 1 tablet (50 mg) by mouth nightly as needed for sleep 30 tablet 0         Vitals                                                                                                                       3, 3     Vitals:    12/24/19 1009 12/24/19 1018   BP: (!) 159/109 (!) 144/97   Pulse: 105 97   Weight: 93.9 kg (207 lb)         Mental Status Exam                                                                                   9, 14 cog      Alertness: alert  and oriented  Appearance:  Casually dressed and Adequately groomed  Behavior/Demeanor: cooperative, calm and guarded, with fair  eye contact   Speech: regular rate and rhythm  Mood :  depressed and anhedonia  Affect: slightly subdued, irritable; was congruent to mood; was congruent to content  Thought Process (Associations):  Linear and Goal directed  Thought process (Rate):  Normal  Thought content:  no overt psychosis, denies suicidal ideation, intent or thoughts and patient does not appear to be responding to internal stimuli  Perception:  Reports none;  Denies depersonalization and derealization  Attention/Concentration:  Fair  Memory:  Immediate recall intact and Short-term memory intact  Language: intact  Fund of Knowledge/Intelligence:  Average  Abstraction:  [place]  Insight:  Adequate  Judgment:  Adequate for safety  Cognition: (6) does  appear grossly intact; formal cognitive testing was not done    Physical Exam     Motor activity/EPS:  Normal  Gait:  Normal  Psychomotor: normal or unremarkable    Labs and  Results      Pertinent findings on review include: Review of records with relevant information reported in the HPI.  Reviewed pt's past medical record and obtained collateral information.      [place] PRESCRIPTION MONITORING PROGRAM [] was checked today:  indicates no refills since last seen..    PHQ9 Today: 13  PHQ-9 SCORE 9/19/2019 10/4/2019 10/22/2019   PHQ-9 Total Score MyChart - - -   PHQ-9 Total Score 22 25 26       Recent Labs   Lab Test 10/23/19  0736 06/26/19  0701 05/11/18  0251   CR 0.85 0.92 0.85   GFRESTIMATED 81 74 72     Recent Labs   Lab Test 10/23/19  0736 06/26/19  0701   AST 8 13   ALT 20 21   ALKPHOS 61 63       PSYCHOTROPIC DRUG INTERACTIONS:    Nortriptyline---Trazodone: Concurrent use of TRAZODONE and SEROTONERGIC AGENTS may result in increased risk of serotonin syndrome.   MANAGEMENT:  Monitoring for adverse effects, routine vitals and minimal use of [Trazodone] and pt is aware of risks.    Impression/Assessment      Zuleyma Reed is a 49 year old adult  who presents for med management follow up.  Pt continues to appear somewhat depressed, but no longer exhibiting flat affect though still exhibit somewhat subdued mood.  Denies SI, SIB or HI.  Pt's nortriptyline level was 34 on 12/13/2019 and pt was instructed by Dr Newton to increase to 75 mg daily x 2 weeks and to 100 mg daily.  Pt reports she has been on 75 mg daily less than a week now.  Encouraged to continue on titration of Nortriptyline and will check the level once when she is on 100 mg daily x 1 week with trough.  Also reiterated Dr Newton's recommendation on lithium augmentation, but pt vehemently declines lithium as she is fearful of the medication for toxicity and weight gain.  [employer] results reviewed and discussed Nortriptyline can be used as typical dosage and also reviewed other augmentation possibilities including Viibryd retrial, Abilify or Geodon as pt is concerned about weight gain, but pt does not want to do any of changes  today as she is concerned about cost of medication as she is unsure insurance coverage after the end of the year as she feels she no longer has her job.  However, pt inquired about adding stimulants to help with depression.  Discussed that could be an option when her blood pressure is better controlled, but with today's BP, it is contraindicated. Discussed pt should speak with her HR to see what's her expected date of return to work (if any, as she thinks she only has a position as a casual, but in that case, she would not have insurance coverage from work).    Pt denied any PTSD related symptoms today including nightmares.  Discussed that it's possible that pt may not be remembering nightmares, but body may be responding to hypervigilance and use of Clonidine or Prazosin, but pt declined.    Pt reported significant improvement with her ADLs while her daughter is visiting. Also, reiterated that she was recommended to start DBT by her PCP and often stepdown from inpt is to attend PHP or IOP.  She was attending PHP prior to admission, but due to URI, she could not attend and PHP note indicated that she could return to PHP when she recovers from URI.  Pt does not think she can attend PHP, but maybe reluctantly open to IOP.  Discussed possibility of inquiring IOP close to home as she does not think coming to this location is feasible due to long commute.  Discussed to explore if there's any program closer by with current therapist as she is local, but pt then reported she did not think it's an option [place].  Discussed [facility] [place] [facility] [place] and [place] agreed to contact for IOP intake.  Information given.  But this all depends on work and insurance.  Discussed that this writer can write a letter of recommendation to accomodation to work if needed.  But if she does not have work, she may not need a letter.  Discussed importance of medication management PLUS psychotherapy to  help with depression management.    Also given information on [facility] clinic for sliding scale psychiatric medication management in case if she loses insurance by the end of the month.  Pt wants help with  to manage her insurance.   on the unit contacted to assist pt.      Diagnosis                                                                   MDD moderate  PTSD  Anxiety  R/out personality disorder    Treatment Recommendation & Plan       Medication Ordered/Consults/Labs/tests Ordered:     Medication: Continue on Nortriptyline 75 mg daily until 2 weeks duration is met and increase it to 100 mg daily as instructed by Dr Newton on 12/13/2019  OTC Recommendations: none  Lab Orders:  none  Referrals:   [facility] [phone]  [facility] for sliding scale service  Release of Information: none  Future Treatment Considerations: Per symptoms. augmentation with Abilify or Geodon?  Return for Follow Up: in 3 weeks    -Discussed safety plan for suicidal thoughts  -Discussed plan for suicidality  -Discussed available emergency services  -Patient agrees with the treatment plan  -Encouraged to continue outpatient therapy to gain more coping mechanism for stress.      Treatment Risk Statement: Discussed with the patient my impressions, as well as recommended studies. I educated patient on the differential diagnosis and prognosis. I discussed with the patient the risks and benefits of medications versus no interventions, including efficacy, dose, possible side effects and length of treatment and the importance of medication compliance.  The patient understands the risks, benefits, adverse effects and alternatives. Agrees to treatment with the capacity to do so. No medical contraindications to treatment. The patient also understands the risks of using street drugs or alcohol.     CRISIS NUMBERS:   Provided routinely in AVS.       I spent 40 minutes face to face today with the patient during today's office  visit.  Over 50% of this time was spent counseling the patient and/or coordinating care regarding management of  depression.  See note for details.    Crystal Reece CNP,  12/24/2019

## 2019-12-24 NOTE — PATIENT INSTRUCTIONS
[facility] ([facility]) [email or web address]    -Ask your therapist about intensive outpatient treatment referral nearby you.  [facility]: [phone] to call and make an appt.    Thank you for coming to the PSYCHIATRY CLINIC.    Lab Testing:  If you had lab testing today and your results are reassuring or normal they will be mailed to you or sent through [employer] within 7 days.   If the lab tests need quick action we will call you with the results.  The phone number we will call with results is # [phone] (home) . If this is not the best number please call our clinic and change the number.    Medication Refills:  If you need any refills please call your pharmacy and they will contact us. Our fax number for refills is [phone]. Please allow three business for refill processing.   If you need to  your refill at a new pharmacy, please contact the new pharmacy directly. The new pharmacy will help you get your medications transferred.     Scheduling:  If you have any concerns about today's visit or wish to schedule another appointment please call our office during normal business hours [phone] (8-5:00 M-F)    Contact Us:  Please call [phone] during business hours (8-5:00 M-F).  If after clinic hours, or on the weekend, please call  [phone].    Financial Assistance [phone]  [facility] B[facility] [phone]  Central Billing Office, [facility]: [phone]  [facility] [phone]  Medical Records [phone]      MENTAL HEALTH CRISIS NUMBERS:  [facility]:   [facility] - [phone]   Crisis Residence [facility] - [phone]   Walk-In Counseling Center [facility] - [phone]   COPE 24/7 [place] Mobile Team for Adults - [[phone]]; Child - [[phone]]        [facility]:   [facility] - [phone]   Walk-in counseling [facility] - [phone]   Walk-in counseling [facility]  Clinic - [phone]   Crisis Residence [facility] - [phone]   Urgent Care Adult Mental Health:   --Drop-in, 24/7 crisis line, and [facility] Mobile Team [[phone]]    CRISIS TEXT LINE: Text [phone] from anywhere, anytime, any crisis 24/7;    OR SEE www.crisistextline.org     Poison Control Center - [phone]    CHILD: [facility] needs assessment team - [phone]     [facility] Lifeline - [phone]; or [place] Lifeline - 1-990.667.3557    If you have a medical emergency please call 911or go to the nearest ER.                    _____________________________________________    Again thank you for choosing PSYCHIATRY CLINIC and please let us know how we can best partner with you to improve you and your family's health.  You may be receiving a survey in the mail regarding this appointment. We would love to have your feedback, both positive and negative, so please fill out the survey and return it using the provided envelope. The survey is done by an external company, so your answers are anonymous.

## 2019-12-24 NOTE — NURSING NOTE
Chief Complaint   Patient presents with     Recheck Medication     Tobacco dependence syndrome

## 2019-12-26 ASSESSMENT — PATIENT HEALTH QUESTIONNAIRE - PHQ9: SUM OF ALL RESPONSES TO PHQ QUESTIONS 1-9: 13

## 2019-12-26 NOTE — TELEPHONE ENCOUNTER
Writer received fax from [facility] requesting a confirmed return to work date, as they received two return dates. Reviewed completed forms. There's a return to work date of 12/16/19 and a return to full-time on 1/1/20.    Called pt and asked for her input. The pt was uncertain what the providers agreed upon, as she said this was never discussed. Pt informed this writer that she's still really struggling but feels 1/1/20 would be appropriate to return to work, but as part-time.     Agreed to discuss this with both providers to confirm a return to work date. Will then call her back with an update.

## 2019-12-27 NOTE — TELEPHONE ENCOUNTER
[facility], requesting 12/24/19 office visit notes. Return to Mariaelena Winston, voice [phone], fax [phone].  Writer printed notes, faxed to provided number.

## 2019-12-27 NOTE — TELEPHONE ENCOUNTER
Crystal Reece, BERTRAM CNP  Lisseth Alberts, JONATHAN Cc: Dmitry Newton MD; Arya Valentin, RN   Caller: Unspecified (1 week ago, 11:56 AM)             So I saw this pt on 12/26, when I asked, she said she is not working and her job doesn't have position for her anymore, so I was confused.  She said she does not know when she is returning to work.  I recommended her IOP because she does not want to do any medication change except to increase Nortriptyline.    Previous Messages      ----- Message -----   From: Lisseth Alberts RN   Sent: 12/26/2019   4:03 PM CST   To: Dmitry Newton MD, Arya Valentin RN, *     ----- Message from Lisseth Alberts RN sent at 12/26/2019  4:03 PM CST -----   Hi All,     I received a cover sheet [facility] requesting a confirmed return to work date. They said they received two dates, but what Moody and I interpreted this as is, a return to work date and a return to full-time date. Her return to work date was 12/16 and full-time date is 1/1/20. What confirmed date should we provide [employer]?     ThanksLisseth

## 2019-12-27 NOTE — TELEPHONE ENCOUNTER
Per conversation from Crystal and Dr. Newton, pt may no longer have a position at her previous place of employment. Called pt to confirm if this is accurate, but no answer. LVM requesting a c/b to discuss this further.

## 2019-12-31 NOTE — TELEPHONE ENCOUNTER
Confirmed with the provider that it will be appropriate to extend return to work date to 1/16/20- two days after next office visit on 1/14/20.    Called Zahida with [employer] at [phone], ext: [phone]. No answer. LVM with the new return to work date.    Claim #: [id number]    Called pt and provided update. Agreed to call Zahida again on 1/2/20 (reminder set).

## 2019-12-31 NOTE — TELEPHONE ENCOUNTER
Writer called pt to discuss IOP further. Pt unwilling to call today, as she said she has other things she needs to complete first. Reviewed provider's note and can see the provider referred her to [facility]/Day treatment. Referral was entered on 12/24. Called behavioral intake and confirmed they received the referral, but no one has called the pt. They will reach out to her today to schedule an evaluation. Per staff, day treatment is 3 hours, 3 days/wk and can run 2-3 months. Will discuss with the provider further. Pt scheduled next for 1/14. Will discuss the option of a date shortly after the next visit.

## 2019-12-31 NOTE — TELEPHONE ENCOUNTER
Writer received incoming phone call from Zahida with [employer]. She is calling to confirm the pt's return to work date. The date they have on file is from Dr. Newton, which is 1/2/20. Although, they received a note from Crystal's visit on 12/24 which does not specify when the pt will return to work. She would like to confirm if Crystal agrees to an extended leave from work past 1/2/20. Even if the patient has lost her job, she will receive benefits until 1/2/20, not past this, unless Crystal provides additional information with a new date. Agreed to connect with the pt and provider.    Called the pt and was informed that she has not lost her job, but cannot guarantee that she will have the same position when she returns. The pt was under the impression that Crystal wanted her to start a new program (pt unable to elaborate on this). If this is true, pt's leave will need to be extended. Agreed to discuss with the provider further and call [employer]. Will then call the pt directly with an update.

## 2019-12-31 NOTE — TELEPHONE ENCOUNTER
Discussed situation with BERTRAM Lorenzo CNP. She is open to extending the pt's return to work date, as long as the pt attends IOP (this was recommended on 12/24).     Called pt and LVM requesting a c/b. Explained in VM that Crystal will extend return to work date, as long as she participates in IOP. Asked that she schedule eval with IOP today and confirm length of program and then call this writer back.

## 2020-01-02 NOTE — TELEPHONE ENCOUNTER
Received response from Shanti Horton via email. She attached Workability Form and asked that the provider complete this and fax or email to her.    Fax: [phone]  Email: [email or web address]    Writer and provider unable to complete today, 1/2. Responded to Shanti Horton and asked if it would be okay to return form to her on 1/7. Awaiting response.

## 2020-01-02 NOTE — TELEPHONE ENCOUNTER
Writer tried calling Zahida with [employer] on 1/2/20 at approximately 9:20 am. Unable to connect.    Called [employer] again at 10:35 am. Able to connect with another representative, Jenny. Provided new return to work date of 1/16/20. Jenny documented this information, along with the plan for pt to start day treatment (eval scheduled for 1/3/20). Lastly, this writer informed Jenny that pt will see provider on 1/14/20 and her return to work date may need to be extended past 1/16/20, after provider completes assessment. If the provider would like to extend the return to work date, Jenny asks that staff call [employer] with this information after the 1/14/20 visit.    Called pt. No answer. LVM with the above information. Requested a c/b if she has any questions or concerns.     *If patient calls back, staff should confirm if patient has contacted HR to discuss her insurance for 2020. Per provider, pt is only casual, which would likely mean she does not have benefits.

## 2020-01-02 NOTE — TELEPHONE ENCOUNTER
Received incoming phone call from the pt. She informed this writer that she received an email from [employer] HR person Shanti Hodges asking for more information from the pt's psych provider, as the pt's return to work date is for today, 1/2/20. The pt asked that writer call Shanti Horton directly.    Writer called Shanti Horton at [phone]. No answer. [facility] requesting a c/b. Also sent email to Shanti Horton at [email or web address]. Awaiting a response.

## 2020-01-03 NOTE — TELEPHONE ENCOUNTER
Received incoming fax from [employer] stating the pt's return to work date is still 1/2/20 and they need more information to extend the return to work date. This was done yesterday, 1/2/20.    Called Zahida at the extension number. No answer. LVM requesting a c/b.     Called [employer] again and was connected with an absence coordinator, Stephen. He took the new start date information again from this writer. He asked if the pt's employer has been contacted yet, which she has not. Writer still waiting for a response from Shanti Horton.

## 2020-01-06 NOTE — TELEPHONE ENCOUNTER
Received VM from Zahida with [employer]. She confirmed that she received message for pt's return to work date to be extended to 1/16/20. Zahida said everything is good to go on their end, but they will need updated information after the appt with Crystal on 1/14. She will call around that time.

## 2020-01-07 NOTE — TELEPHONE ENCOUNTER
Received completed and signed form from the provider. Faxed to Shanti Horton at [phone]. Form held at writer's desk.

## 2020-01-14 ENCOUNTER — OFFICE VISIT (OUTPATIENT)
Dept: PSYCHIATRY | Facility: CLINIC | Age: 50
End: 2020-01-14
Attending: NURSE PRACTITIONER
Payer: MEDICAID

## 2020-01-14 DIAGNOSIS — F33.1 MDD (MAJOR DEPRESSIVE DISORDER), RECURRENT EPISODE, MODERATE (H): Primary | ICD-10-CM

## 2020-01-14 DIAGNOSIS — F43.10 PTSD (POST-TRAUMATIC STRESS DISORDER): ICD-10-CM

## 2020-01-14 DIAGNOSIS — F41.9 ANXIETY: ICD-10-CM

## 2020-01-14 NOTE — NURSING NOTE
Pt late, Patient taken to treatment room before rooming/vitals done today.   Veronica Dunbar, CMA

## 2020-01-14 NOTE — PATIENT INSTRUCTIONS
-Start Prozac 20 mg daily  -May take L methyl folate 15 mg daily    -For seasonal light, you have to bring a paper prescription to local medical equipment store.    Thank you for coming to the PSYCHIATRY CLINIC.    Lab Testing:  If you had lab testing today and your results are reassuring or normal they will be mailed to you or sent through [facility] within 7 days.   If the lab tests need quick action we will call you with the results.  The phone number we will call with results is # [phone] (home) . If this is not the best number please call our clinic and change the number.    Medication Refills:  If you need any refills please call your pharmacy and they will contact us. Our fax number for refills is [phone]. Please allow three business for refill processing.   If you need to  your refill at a new pharmacy, please contact the new pharmacy directly. The new pharmacy will help you get your medications transferred.     Scheduling:  If you have any concerns about today's visit or wish to schedule another appointment please call our office during normal business hours [phone] (8-5:00 M-F)    Contact Us:  Please call [phone] during business hours (8-5:00 M-F).  If after clinic hours, or on the weekend, please call  [phone].    Financial Assistance [phone]  [facility] Billing [phone]  Central Billing Office, [facility]: [phone]  [facility] Billing [phone]  Medical Records [phone]      MENTAL HEALTH CRISIS NUMBERS:  [facility]:   [facility] - [phone]   Crisis Residence [facility] - [facility] - [phone]   Walk-In Counseling Center [facility] - [phone]   COPE 24/7 [facility] Mobile Team for Adults - [[phone]]; Child - [[phone]]        [facility]:   [facility] - [phone]   Walk-in counseling [facility] - [phone]   Walk-in counseling [facility] - [phone]   Crisis  Residence [facility] - [phone]   Urgent Care Adult Mental Health:   --Drop-in, 24/7 crisis line, and [facility] Mobile Team [[phone]]    CRISIS TEXT LINE: Text [phone] from anywhere, anytime, any crisis 24/7;    OR SEE www.crisistextline.org     National Suicide Prevention Lifeline: 086-950-YRHC (270-618-4586) or dial 988    Poison Control Center - 1-665.523.7383    CHILD: [facility] needs assessment team - [phone]     Trans Lifeline - 1-544.904.2971; or [employer] Lifeline - 1-355.160.6806    If you have a medical emergency please call 911or go to the nearest ER.                    _____________________________________________    Again thank you for choosing PSYCHIATRY CLINIC and please let us know how we can best partner with you to improve you and your family's health.  You may be receiving a survey regarding this appointment. We would love to have your feedback, both positive and negative. The survey is done by an external company, so your answers are anonymous.

## 2020-01-14 NOTE — PROGRESS NOTES
Psychiatry Clinic Progress Note                                                                  Patient Name: Zuleyma Reed  YOB: 1970  MRN: [id number]  Date of Service:  1/14/2020  Last Seen:12/24/2019    Zuleyma Reed is a 49 year old female who uses the name Zuleyma and pronoun she.       Zuleyma Reed is a 49 year old year old adult who presents for ongoing psychiatric care.  Zuleyma Reed was last seen in clinic on 12/24/2019.    At that time,     Medication Ordered/Consults/Labs/tests Ordered:      Medication: Continue on Nortriptyline 75 mg daily until 2 weeks duration is met and increase it to 100 mg daily as instructed by Dr Newton on 12/13/2019  OTC Recommendations: none  Lab Orders:  none  Referrals:   [facility] [phone]  [facility] for sliding scale service  Release of Information: none  Future Treatment Considerations: Per symptoms. augmentation with Abilify or Geodon?  Return for Follow Up: in 3 weeks      Pertinent Background:  Diagnoses include MDD, PTSD and anxiety.  Psych critical item history includes suicide attempt [multiple], mutiple psychotropic trials, trauma hx, psych hosp (3-5) and ECT.      Previous Psychiatric Meds: Celexa, Zoloft, Lexapro, Prozac, Paxil, Wellbutrin (vivid dream, not effective at 150 mg BID), Effexor, Cymbalta, Remeron, Trazodone and Ambien (oversedations with all medications), was prescribed Lithium once, but did not start.       Interim History                                                                                                        4, 4     Since the last visit, pt reports stop taking Nortriptyline last week due to significant dry mouth.  Pt tried 100 mg daily for few days and she could not tolerate.  Has not noticed any exacerbation of depression nor anxiety as pt reports partly she stopped the medication as she did not feel it was effective.  Pt also feels less sedated during day time. Does not take Trazodone. Denies SI, SIB or  HI. Pt also reports she did not go to IOP assessment because she was ill.  However, pt went to [facility] support group x 1 yesterday and found it very helpful and hoping to go x2/week.  Reporting something scheduled to go was helpful.  Pt reports she has not tried Prozac in the past though it indicated in her past record she tried and reviewing Genesight, Prozac can be used typically.  Pt also wonders TMS, L methlfolate supplementation and SAD light.  Pt continues to decline lithium.    Pt brings in a letter from work to indicate that if she is unable to return to work by 1/16/2020, her employment will end and benefits will only continue through the month of January.  Pt is concerned that even if she goes back to work, she is unsure how she would afford IOP care as she would have to drive 1.5 hrs each way and parking fee etc.  Pt has not discussed this with her therapist to see if there's IOP close by her.    Denies any symptoms suggestive of hypomania or psychosis.    Current Suicidality/Hx of Suicide Attempts: denies currently, hx of SA x 3, most recently 1/2019 by overdose  CoCominent Medical concerns: Reports back pain from weight gain    Medication Side Effects: The patient denies all medication side effects.      Medical Review of Systems     Apart from the symptoms mentioned int he HPI, the 14 point review of systems, including constitutional, HEENT, cardiovascular, respiratory, gastrointestinal, genitourinary, musculoskeletal, integumentary, endocrine, neurological, hematologic and allergic is entirely negative except back pain.    Pregnant: None. Nursing: None, Contraception: Mirena IUD, not currently sexually active    Substance Use   Pt has been staying substance free since last seen.      Medical / Surgical History                                                                                                                  Patient Active Problem List   Diagnosis     Depression with anxiety     Allergic  rhinitis     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     Dysthymic disorder     Family history of autoimmune disorder     Generalized anxiety disorder     IUD (intrauterine device) in place     Major depressive disorder, recurrent episode (H)     Primary localized osteoarthrosis, lower leg     Tension type headache     MDD (major depressive disorder)     Suicidal ideation     Tobacco dependence syndrome       Past Surgical History:   Procedure Laterality Date     ORTHOPEDIC SURGERY          Social/ Family History                                  [per patient report]                                 1ea,1ea   Living arrangements: lives alone and feels safe  Social Support: daughter, M, F  Access to gun: denies    Allergy                                Patient has no known allergies.    Current Medications                                                                                                       Current Outpatient Medications   Medication Sig Dispense Refill     acetaminophen (TYLENOL) 325 MG tablet Take 975 mg by mouth every 6 hours as needed for mild pain       aspirin-acetaminophen-caffeine (EXCEDRIN MIGRAINE) 250-250-65 MG tablet Take 1 tablet by mouth every 6 hours as needed for headaches       nortriptyline (PAMELOR) 25 MG capsule Take 3 caps (75 mg) by mouth for 2 week then, After 2 weeks, if  tolerated, increse to 4 caps (100 mg) daily at bedtime 98 capsule 0     traZODone (DESYREL) 50 MG tablet Take 1 tablet (50 mg) by mouth nightly as needed for sleep 30 tablet 0         Vitals                                                                                                                       3, 3   There were no vitals taken for this visit.        Mental Status Exam                                                                                   9, 14 cog      Alertness: alert  and oriented  Appearance:  Casually dressed and Adequately groomed  Behavior/Demeanor: cooperative and calm,  with fair  eye contact   Speech: regular rate and rhythm  Mood :  depressed  Affect: mostly full range, tearful when discussing possible work termination; was not congruent to mood; was not congruent to content  Thought Process (Associations):  Linear and Goal directed  Thought process (Rate):  Normal  Thought content:  no overt psychosis, denies suicidal ideation, intent or thoughts and patient does not appear to be responding to internal stimuli  Perception:  Reports none;  Denies depersonalization and derealization  Attention/Concentration:  Fair  Memory:  Immediate recall intact and Short-term memory intact  Language: intact  Fund of Knowledge/Intelligence:  Average  Abstraction:  [employer]  Insight:  Adequate  Judgment:  Adequate for safety  Cognition: (6) does  appear grossly intact; formal cognitive testing was not done    Physical Exam     Motor activity/EPS:  Normal  Gait:  Normal  Psychomotor: normal or unremarkable    Labs and Results      Pertinent findings on review include: Review of records with relevant information reported in the HPI.  Reviewed pt's past medical record and obtained collateral information.      [place] PRESCRIPTION MONITORING PROGRAM [] was checked today:  indicates no refills since last seen..    PHQ9 Today:  15  PHQ-9 SCORE 10/4/2019 10/22/2019 12/24/2019   PHQ-9 Total Score MyChart - - -   PHQ-9 Total Score 25 26 13       Recent Labs   Lab Test 10/23/19  0736 06/26/19  0701 05/11/18  0251   CR 0.85 0.92 0.85   GFRESTIMATED 81 74 72     Recent Labs   Lab Test 10/23/19  0736 06/26/19  0701   AST 8 13   ALT 20 21   ALKPHOS 61 63       PSYCHOTROPIC DRUG INTERACTIONS:    no.  MANAGEMENT:  N/A    Impression/Assessment      Pt was 16 minutes late to the appointment and decided to complete the appt for the reminder of the appt rather than rescheduling though pt was seen for full 30 minutes.    Zuleyma Reed is a 49 year old adult  who presents for med management follow up.  Pt appears  somewhat anxious and slightly depressed, denies SI, SIB or HI.  Pt reports stopping Nortriptyline due to side effects last week and has not noticed any exacerbation of depression or anxiety as pt did not feel any improvement with Nortriptyline.  Pt wanted trial of Prozac after reviewing Genesight test results carefully though previous record indicated failed trial of Prozac.  Pt did not think she tried Prozac. Discussed Prozac could be activating and exacerbate anxiety, but pt reported anxiety is fairly well managed. OK to start Prozac 20 mg daily, since pt discontinued Nortriptyline and Trazodone, will discontinue both medications at this time.  Pt also discussed possibility of L methyl folate supplementation due to low conversion, discussed pt may take L methyl folate 15 mg daily.  Pt also wanted trial of SAD light and this was ordered, but discussed uncertainty of insurance coverage.  Pt also discussed she is interested in TMS, but unsure insurance coverage.  Pt was encouraged to check with insurance, if insurance covers, TRD referral would be ordered.      It was discussed recommendation would be still IOP especially since pt reports having consistent schedule would be helpful, however pt has missed IOP assessment and is unsure how to afford transportation between the care especially if she loses the job.  Reiterated importance to discuss this with her therapist to see if there's IOP near her.  Pt signed RAINA for the therapist today, this writer called and LVM for therapist.  Pt gave fax number instead of phone number and this writer researched number to reach the patient.  Pt also was unsure what type of letter is needed for work accomodation and she has not called HR, she indicated she would call HR and get back to this writer ASAP for necessary paperwork.  Pt is aware that this writer's recommendation is medication plus IOP like intensive treatment.  Pt is also aware that this needs to be processed ASAP as her  work letter indicates that if she is unable to return to work by 1/16/2020, she will lose the job.  Pt is also aware that this wrier is out of office 1/20/2020-2/13/2020 and in case of emergency, pt can call to make an appointment to see other providers.      Diagnosis                                                                   MDD moderate  PTSD  Anxiety  R/out personality disorder    Treatment Recommendation & Plan       Medication Ordered/Consults/Labs/tests Ordered:     Medication:   -Start Prozac 20 mg daily  -Discontinued Trazodone and Nortriptyline per pt's request as she has not been taking the medication  OTC Recommendations: May take L methyl folate 15 mg daily  Lab Orders:  none  Referrals: none, if insurance covers TMS, pt is interested in TRD consult  Release of Information: Floridalma Liz ([phone]) and ALFREDA  Future Treatment Considerations: Per symptoms. Increase Prozac  Return for Follow Up: in 6 weeks    -Discussed safety plan for suicidal thoughts  -Discussed plan for suicidality  -Discussed available emergency services  -Patient agrees with the treatment plan  -Encouraged to continue outpatient therapy to gain more coping mechanism for stress.    Treatment Risk Statement: Discussed with the patient my impressions, as well as recommended studies. I educated patient on the differential diagnosis and prognosis. I discussed with the patient the risks and benefits of medications versus no interventions, including efficacy, dose, possible side effects and length of treatment and the importance of medication compliance.  The patient understands the risks, benefits, adverse effects and alternatives. Agrees to treatment with the capacity to do so. No medical contraindications to treatment. The patient also understands the risks of using street drugs or alcohol.     CRISIS NUMBERS:   Provided routinely in AVS.      Crystal Reece CNP,  1/14/2020

## 2020-01-15 ENCOUNTER — MYC MEDICAL ADVICE (OUTPATIENT)
Dept: PSYCHIATRY | Facility: CLINIC | Age: 50
End: 2020-01-15

## 2020-01-16 ENCOUNTER — TELEPHONE (OUTPATIENT)
Dept: PSYCHIATRY | Facility: CLINIC | Age: 50
End: 2020-01-16

## 2020-01-16 NOTE — TELEPHONE ENCOUNTER
Social Work   Incoming/Outgoing Call  [facility]    Outgoing Call To: Zuleyma Reed    Reason for Call:  CORBY following up on health insurance concerns.    Response/Plan:  Patient shared estimate of monthly income with writer. Based on her reports, she would likely [place] for [place]. SW reviewed [place] for medical assistance, [place], and [place] credit help from [employer]. Patient is not interested in market place plan due to level of deductibles. She is also worried as her daughter (25) is covered by her insurance. SW reviewed qualifying event status and encouraged her daughter to check in with HR at her job to see if she qualifies to start health care coverage through them.     SW directed Zuleyma to [employer] website to review income information on her own and possibly apply, and to talk with her HR to confirm how much she is getting in disability payments. Zuleyma noted she is planning to do occasional work within company once she is approved by providers. SW reviewed she will need to count this as income. SW also encouraged her to check with HR at her company to identify how plan to work some hours would affect disability payments.      Zuleyma reported feeling a sense of hope after learning there may be some options. SW provided direct contact number if patient needed support or advocacy.    Will route to patient's current psychiatric provider(s) as an FYI.   Please call or EPIC message with any questions or concerns.    TROY Avila, [facility]  [phone]

## 2020-01-16 NOTE — TELEPHONE ENCOUNTER
Received fax of behavioral health capacity questionnaire for pt from [facility]. Routed to PALAK Reece.    Writer left voice mail message for pt, requesting callback to explore the specifics about restrictions that would work for her, such as days per week and hours per day. Also identified that the paperwork from [facility] was received.       Received call from pt, who identified that any casual shift would be for full 8 hours.  She suggested restrictions of not currently able to return to work full or part time, but can do casual shift work, 1-2 times per pay period if available. Restrictions through 3/31.    Routed to provider.          Crystal Reece APRN CNP Snyder, David J, RN   Caller: Unspecified (Yesterday,  2:23 PM)             Thank you, I will bella this, but only until the end of Feb (2/29) as she has an appt with me on 2/25 to reevaluate.        Received completed form, faxed to [facility] at [phone]. Form placed in writer's clinic folder for interim.  Left voice mail message with Shanti Hodges ([phone]) requesting callback to identify any further information needed.        Writer received voice mail message from Shanti Horton, who identified that an updated medical report would be helpful and could be faxed to absence management department at [phone].  Writer conferred with PALAK Reece, who identified there would be no changes to the form completed on 1/7.

## 2020-01-23 NOTE — TELEPHONE ENCOUNTER
Writer received voice mail message form Shanti Horton, inquiring on updates to medical report form.  Writer left voice mail message for Shanti Horton, identifying that the provider had no updates to the form previously sent.

## 2020-01-27 NOTE — TELEPHONE ENCOUNTER
Writer received voice mail message from Shanti Horton, indicating that the last workability form they have is from 1/7, but no information was provided on if the pt is able to return to work or needs to remain off work. Shanti Horton requests a note or a new form indicating if pt is capable of working, the restrictions she would have, and / or how long she needs to be off work.      Writer received voice mail message from pt, requesting callback regarding need for a new workability form for [place].      Writer called pt and confirmed that the restriction specified on 1/16/20 [employer] form (1-2 casual shifts per pay period until 2/29/20 with next evaluation on 2/25/20) is what should be specified on the [place] workability form.    Writer left voice mail message for Shanti Horton, requesting blank workability form via fax, email, or intranet.      Felizr found blank form on [place] intranet, coordinated with Dr. Valencia to sign the form, routed it to her clinic folder.

## 2020-01-28 NOTE — TELEPHONE ENCOUNTER
Writer received a voice mail message from Shanti Horton, who identified that she will fax blank form to clinic, but that the [employer] form would be acceptable.

## 2020-01-29 NOTE — TELEPHONE ENCOUNTER
Writer received signed [employer] Workability form, faxed to Shanti Horton along with [employer] form from January.

## 2020-02-06 ENCOUNTER — TELEPHONE (OUTPATIENT)
Dept: PSYCHIATRY | Facility: CLINIC | Age: 50
End: 2020-02-06

## 2020-02-06 NOTE — TELEPHONE ENCOUNTER
Writer left voice mail message for pt, requesting callback to follow up on her status and response to latest medication change. Writer prompted pt to request to speak to another nurse if writer was not available.

## 2020-02-28 ENCOUNTER — MYC MEDICAL ADVICE (OUTPATIENT)
Dept: PSYCHIATRY | Facility: CLINIC | Age: 50
End: 2020-02-28

## 2020-02-28 NOTE — LETTER
3/3/2020      Patient: Zuleyma Reed  : 1970      To Whom it May Concern:    Zuleyma was last assessed in our clinic on 20. At that time the recommended restrictions were for her to work 1-2 casual shifts per pay period.    Zuleyma has lost her medical insurance coverage as of  due to the loss of her full-time position. Because she is currently without insurance she cannot afford to be seen in our clinic for further evaluation. When she has insurance coverage she can be seen and re-assessed.      Sincerely,        Arya Valentin RN for [facility]

## 2020-02-28 NOTE — TELEPHONE ENCOUNTER
Crystal Reece APRN CNP  Lisseth Alberts, JONATHAN   Cc: Arya Valentin, RN; Ping Emery, [facility]   Caller: Unspecified (Today,  2:33 PM)             Hi all     Thanks for relaying the message.  I am not sure if she is taking any Prozac that she wanted to restart though I said she said it was not helpful in the past.  Since it's unsure when she can follow up, I am very hesitant to start new medication (let alone benzo).  I agree with Arya that the best for her at this time is probably seek services at primary care.  Pt told me that she lives far away and there's no sliding scale service clinic available, I am not sure what would be best, but I know our service is more expensive than primary care clinic.     I can take a look at the form, but I have no new things to say except pt reports she can't work.  Since I haven't seen her, I can't evaluate.  I can also write what she has reported in mychart and in Lisseth's phone call, if that's sufficient as a paperwork, I am not sure.

## 2020-02-28 NOTE — TELEPHONE ENCOUNTER
Writer received incoming phone call from the pt relaying the same information from her Fridge message. Pt shared that [employer] needs an update from the provider regarding the pt's condition.     She also shared that she's feeling overwhelmed with this entire process and has been struggling with anxiety and panic attacks. Pt found three tabs of Xanax and took a dose and noticed significant improvement in her symptoms. She said prior to finding the Xanax, she was close to going to the ED. Pt denies any SI or safety concerns at this time and understands when it's appropriate to go to the ED. She also has crisis phone numbers that she will utilize over the weekend if necessary.    Pt shared that she has been struggling to complete ADLs and get out of the house. The last time she showered and left the house was Monday morning. Today, she needs to leave the house to get dog food. Writer stressed the importance of getting out of the house, even to take a short walk and try and engage in exercise. Relayed TIPP skills for her anxiety as well. Patient voiced understanding.    Writer agreed to relay all of this information to her care team for further recommendations. Pt said she already applied for health insurance and is awaiting a response. Agreed to reach out to SW as well, as pt may need help with any remaining steps. Pt agreed with this plan, as she's feeling overwhelmed.

## 2020-02-28 NOTE — TELEPHONE ENCOUNTER
"CORBY talked briefly with Zuleyma. She has applied for health insurance and it appears state has reached a determination and that a \"letter is in the mail.\" She went on the [facility] website to try to get more information but noted it is not easy to navigate (writer concurs).    Zuleyma was unable to talk further as she is going to take a shower and needs to get vehicle tabs renewed before the office closes today.    She agreed to call from writer next week to check in.    Ping Emery, MACKSW    "

## 2020-03-03 NOTE — TELEPHONE ENCOUNTER
Writer faxed behavioral health capacity questionnaire and mental status examination form from 1/16/19 along with letter attached to this encounter to Zahida Enamorado at [phone].

## 2020-03-05 NOTE — TELEPHONE ENCOUNTER
SW left voicemail checking in on status of insurance. Requested call back and left contact information.    Ping Emery, [facility]

## 2020-03-09 ENCOUNTER — HOSPITAL ENCOUNTER (EMERGENCY)
Facility: CLINIC | Age: 50
Discharge: HOME | End: 2020-03-10
Attending: EMERGENCY MEDICINE | Admitting: EMERGENCY MEDICINE
Payer: MEDICAID

## 2020-03-09 ENCOUNTER — APPOINTMENT (OUTPATIENT)
Dept: GENERAL RADIOLOGY | Facility: CLINIC | Age: 50
End: 2020-03-09
Attending: EMERGENCY MEDICINE
Payer: MEDICAID

## 2020-03-09 DIAGNOSIS — F41.0 PANIC ATTACK: ICD-10-CM

## 2020-03-09 LAB
BASOPHILS # BLD AUTO: 0.1 10E9/L (ref 0–0.2)
BASOPHILS NFR BLD AUTO: 0.9 %
D DIMER PPP FEU-MCNC: 0.3 UG/ML FEU (ref 0–0.5)
DIFFERENTIAL METHOD BLD: ABNORMAL
EOSINOPHIL NFR BLD AUTO: 0.9 %
ERYTHROCYTE [DISTWIDTH] IN BLOOD BY AUTOMATED COUNT: 12.9 % (ref 10–15)
HCT VFR BLD AUTO: 45.6 % (ref 35–47)
HGB BLD-MCNC: 15.5 G/DL (ref 11.7–15.7)
IMM GRANULOCYTES # BLD: 0 10E9/L (ref 0–0.4)
IMM GRANULOCYTES NFR BLD: 0.3 %
LYMPHOCYTES # BLD AUTO: 3.3 10E9/L (ref 0.8–5.3)
LYMPHOCYTES NFR BLD AUTO: 25.6 %
MCH RBC QN AUTO: 30.7 PG (ref 26.5–33)
MCHC RBC AUTO-ENTMCNC: 34 G/DL (ref 31.5–36.5)
MCV RBC AUTO: 90 FL (ref 78–100)
MONOCYTES # BLD AUTO: 0.8 10E9/L (ref 0–1.3)
MONOCYTES NFR BLD AUTO: 6.6 %
NEUTROPHILS # BLD AUTO: 8.3 10E9/L (ref 1.6–8.3)
NEUTROPHILS NFR BLD AUTO: 65.7 %
NRBC # BLD AUTO: 0 10*3/UL
NRBC BLD AUTO-RTO: 0 /100
PLATELET # BLD AUTO: 269 10E9/L (ref 150–450)
RBC # BLD AUTO: 5.05 10E12/L (ref 3.8–5.2)
WBC # BLD AUTO: 12.7 10E9/L (ref 4–11)

## 2020-03-09 PROCEDURE — 85379 FIBRIN DEGRADATION QUANT: CPT | Performed by: EMERGENCY MEDICINE

## 2020-03-09 PROCEDURE — 71046 X-RAY EXAM CHEST 2 VIEWS: CPT | Mod: TC

## 2020-03-09 PROCEDURE — 96360 HYDRATION IV INFUSION INIT: CPT | Performed by: EMERGENCY MEDICINE

## 2020-03-09 PROCEDURE — 85025 COMPLETE CBC W/AUTO DIFF WBC: CPT | Performed by: EMERGENCY MEDICINE

## 2020-03-09 PROCEDURE — 99285 EMERGENCY DEPT VISIT HI MDM: CPT | Mod: 25 | Performed by: EMERGENCY MEDICINE

## 2020-03-09 PROCEDURE — 84484 ASSAY OF TROPONIN QUANT: CPT | Performed by: EMERGENCY MEDICINE

## 2020-03-09 PROCEDURE — 93010 ELECTROCARDIOGRAM REPORT: CPT | Mod: Z6 | Performed by: EMERGENCY MEDICINE

## 2020-03-09 PROCEDURE — 93005 ELECTROCARDIOGRAM TRACING: CPT | Performed by: EMERGENCY MEDICINE

## 2020-03-09 PROCEDURE — 25800030 ZZH RX IP 258 OP 636: Performed by: EMERGENCY MEDICINE

## 2020-03-09 PROCEDURE — 80048 BASIC METABOLIC PNL TOTAL CA: CPT | Performed by: EMERGENCY MEDICINE

## 2020-03-09 RX ADMIN — SODIUM CHLORIDE 1000 ML: 9 INJECTION, SOLUTION INTRAVENOUS at 23:38

## 2020-03-09 NOTE — ED AVS SNAPSHOT
[facility] Emergency Department  [address] [place] [address]  Phone:  [phone]  Fax:  [phone]                                    Zuleyma Reed   MRN: [id number]    Department:  [facility] Emergency Department   Date of Visit:  3/9/2020           After Visit Summary Signature Page    I have received my discharge instructions, and my questions have been answered. I have discussed any challenges I see with this plan with the nurse or doctor.    ..........................................................................................................................................  Patient/Patient Representative Signature      ..........................................................................................................................................  Patient Representative Print Name and Relationship to Patient    ..................................................               ................................................  Date                                   Time    ..........................................................................................................................................  Reviewed by Signature/Title    ...................................................              ..............................................  Date                                               Time          22EPIC Rev 08/18

## 2020-03-10 VITALS
WEIGHT: 220 LBS | BODY MASS INDEX: 32.96 KG/M2 | TEMPERATURE: 98.4 F | HEART RATE: 87 BPM | OXYGEN SATURATION: 100 % | SYSTOLIC BLOOD PRESSURE: 150 MMHG | DIASTOLIC BLOOD PRESSURE: 87 MMHG | RESPIRATION RATE: 18 BRPM

## 2020-03-10 LAB
ANION GAP SERPL CALCULATED.3IONS-SCNC: 7 MMOL/L (ref 3–14)
BUN SERPL-MCNC: 14 MG/DL (ref 7–30)
CALCIUM SERPL-MCNC: 8.8 MG/DL (ref 8.5–10.1)
CHLORIDE SERPL-SCNC: 106 MMOL/L (ref 94–109)
CO2 SERPL-SCNC: 23 MMOL/L (ref 20–32)
CREAT SERPL-MCNC: 0.99 MG/DL (ref 0.52–1.04)
GFR SERPL CREATININE-BSD FRML MDRD: 68 ML/MIN/{1.73_M2}
GLUCOSE SERPL-MCNC: 118 MG/DL (ref 70–99)
POTASSIUM SERPL-SCNC: 3.7 MMOL/L (ref 3.4–5.3)
SODIUM SERPL-SCNC: 136 MMOL/L (ref 133–144)
TROPONIN I SERPL-MCNC: <0.015 UG/L (ref 0–0.04)

## 2020-03-10 RX ORDER — LORAZEPAM 1 MG/1
1 TABLET ORAL EVERY 8 HOURS PRN
Qty: 10 TABLET | Refills: 0 | Status: SHIPPED | OUTPATIENT
Start: 2020-03-10 | End: 2020-05-15

## 2020-03-10 NOTE — ED PROVIDER NOTES
History     Chief Complaint   Patient presents with     Anxiety     HPI  Zuleyma Reed is a 49 year old female who presents with concerns she was having an anxiety attack perhaps.  She suffers from long-term depression and anxiety, currently out of work.  At home she was taking a shower which she does not do often and began to feel short of air and like she was going to pass out.  She went to her depression support group [facility] and continued to feel shaky and concerned.  She has no suicidal or homicidal thoughts.  She is currently not on any medications for depression or anxiety.  She was recently hospitalized and underwent 14 ECT treatments.    Allergies:  No Known Allergies    Problem List:    Patient Active Problem List    Diagnosis Date Noted     Tobacco dependence syndrome 10/30/2019     Priority: Medium     Suicidal ideation 10/22/2019     Priority: Medium     MDD (major depressive disorder) 09/19/2019     Priority: Medium     Depression with anxiety      Priority: Medium     IUD (intrauterine device) in place 05/15/2017     Priority: Medium     Overview:   5/15/2017 Mirena       Family history of autoimmune disorder 10/28/2015     Priority: Medium     Overview:   Dad has lupus  Pat. Aunt ALS and stiff person syndrome  All 5 of dad's sibling have an autoimmune disorder       Allergic rhinitis 02/12/2014     Priority: Medium     Dysthymic disorder 02/12/2014     Priority: Medium     Tension type headache 04/13/2011     Priority: Medium     Primary localized osteoarthrosis, lower leg 09/01/2010     Priority: Medium     Generalized anxiety disorder 06/09/2010     Priority: Medium     Major depressive disorder, recurrent episode (H) 01/06/2006     Priority: Medium     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 11/07/2005     Priority: Medium        Past Medical History:    Past Medical History:   Diagnosis Date     Depression with anxiety      Depressive disorder        Past Surgical History:    Past  Surgical History:   Procedure Laterality Date     ORTHOPEDIC SURGERY         Family History:    Family History   Problem Relation Age of Onset     Lupus Father      Autoimmune Disease Paternal Aunt         ALS     Autoimmune Disease Paternal Aunt         crohn's     Suicide Maternal Great-Grandmother        Social History:  Marital Status:  Single [1]  Social History     Tobacco Use     Smoking status: Former Smoker     Packs/day: 0.00     Years: 0.00     Pack years: 0.00     Smokeless tobacco: Never Used   Substance Use Topics     Alcohol use: Yes     Comment: 3 times a week, wine vodka     Drug use: No        Medications:    acetaminophen (TYLENOL) 325 MG tablet  LORazepam (ATIVAN) 1 MG tablet  aspirin-acetaminophen-caffeine (EXCEDRIN MIGRAINE) 250-250-65 MG tablet          Review of Systems  All other systems are reviewed and are negative    Physical Exam   BP: (!) 165/101  Pulse: 98  Temp: 98.4  F (36.9  C)  Resp: 20  Weight: 99.8 kg (220 lb)  SpO2: 99 %      Physical Exam  Vitals signs and nursing note reviewed.   Constitutional:       General: She is not in acute distress.     Appearance: She is well-developed. She is not diaphoretic.   HENT:      Head: Normocephalic and atraumatic.   Eyes:      General: No scleral icterus.     Pupils: Pupils are equal, round, and reactive to light.   Neck:      Musculoskeletal: Normal range of motion and neck supple.   Cardiovascular:      Rate and Rhythm: Normal rate and regular rhythm.      Heart sounds: Normal heart sounds. No murmur.   Pulmonary:      Effort: No respiratory distress.      Breath sounds: No stridor. No wheezing or rales.   Abdominal:      Palpations: Abdomen is soft.      Tenderness: There is no abdominal tenderness.   Musculoskeletal:         General: No tenderness.   Skin:     General: Skin is warm and dry.      Coloration: Skin is not pale.      Findings: No erythema or rash.   Neurological:      Mental Status: She is alert.   Psychiatric:         Mood  and Affect: Mood is anxious and depressed. Affect is tearful.         Thought Content: Thought content does not include homicidal or suicidal ideation.         ED Course        Procedures          EKG reveals normal sinus rhythm at 90 bpm.  No acute ST segment or T wave changes noted.  Interpreted by myself.    Critical Care time:  none               Results for orders placed or performed during the hospital encounter of 03/09/20 (from the past 24 hour(s))   CBC with platelets differential   Result Value Ref Range    WBC 12.7 (H) 4.0 - 11.0 10e9/L    RBC Count 5.05 3.8 - 5.2 10e12/L    Hemoglobin 15.5 11.7 - 15.7 g/dL    Hematocrit 45.6 35.0 - 47.0 %    MCV 90 78 - 100 fl    MCH 30.7 26.5 - 33.0 pg    MCHC 34.0 31.5 - 36.5 g/dL    RDW 12.9 10.0 - 15.0 %    Platelet Count 269 150 - 450 10e9/L    Diff Method Automated Method     % Neutrophils 65.7 %    % Lymphocytes 25.6 %    % Monocytes 6.6 %    % Eosinophils 0.9 %    % Basophils 0.9 %    % Immature Granulocytes 0.3 %    Nucleated RBCs 0 0 /100    Absolute Neutrophil 8.3 1.6 - 8.3 10e9/L    Absolute Lymphocytes 3.3 0.8 - 5.3 10e9/L    Absolute Monocytes 0.8 0.0 - 1.3 10e9/L    Absolute Basophils 0.1 0.0 - 0.2 10e9/L    Abs Immature Granulocytes 0.0 0 - 0.4 10e9/L    Absolute Nucleated RBC 0.0    Basic metabolic panel   Result Value Ref Range    Sodium 136 133 - 144 mmol/L    Potassium 3.7 3.4 - 5.3 mmol/L    Chloride 106 94 - 109 mmol/L    Carbon Dioxide 23 20 - 32 mmol/L    Anion Gap 7 3 - 14 mmol/L    Glucose 118 (H) 70 - 99 mg/dL    Urea Nitrogen 14 7 - 30 mg/dL    Creatinine 0.99 0.52 - 1.04 mg/dL    GFR Estimate 68 >60 mL/min/[1.73_m2]    GFR Estimate If Black 78 >60 mL/min/[1.73_m2]    Calcium 8.8 8.5 - 10.1 mg/dL   D dimer quantitative   Result Value Ref Range    D Dimer 0.3 0.0 - 0.50 ug/ml FEU   Troponin I   Result Value Ref Range    Troponin I ES <0.015 0.000 - 0.045 ug/L   XR Chest 2 Views    Narrative    EXAM: XR CHEST 2 VW  LOCATION: [facility]  Services  DATE/TIME: 3/9/2020 11:44 PM    INDICATION: Shortness of breath.  COMPARISON: None.      Impression    IMPRESSION: Negative chest.       Medications   0.9% sodium chloride BOLUS (0 mLs Intravenous Stopped 3/10/20 0012)       Assessments & Plan (with Medical Decision Making)  49-year-old female with long-term depression and anxiety who had a panic attack it appears today.  Medical work-up as above negative.  Prescribed Ativan as needed.  Recommended she try to return to her Prozac.  Also recommended she try to get daily exercise, at a minimum walk 30 minutes a day outside.  Also try to get regular diet and rest.  Follow-up with her primary care clinic in the next week.  Return anytime sooner if condition worsens or other concern.     I have reviewed the nursing notes.    I have reviewed the findings, diagnosis, plan and need for follow up with the patient.       New Prescriptions    LORAZEPAM (ATIVAN) 1 MG TABLET    Take 1 tablet (1 mg) by mouth every 8 hours as needed for anxiety       Final diagnoses:   None       3/9/2020   [facility] EMERGENCY DEPARTMENT     Jak Weinstein MD  03/10/20 0012

## 2020-03-10 NOTE — ED TRIAGE NOTES
"Pt reports she has severe anxiety and depression and took a shower today and almost passed out, she does not usually take showers or leave her home due to the anxiety and depression, she now had shortness of breath and reports \"I don't know if something is wrong or I am just that anxious\"  "

## 2020-03-10 NOTE — DISCHARGE INSTRUCTIONS
Try to get out of the house daily for some regular exercise, such as a 30 minute walk.    Try to eat and sleep regularly.

## 2020-03-11 ENCOUNTER — ANCILLARY PROCEDURE (OUTPATIENT)
Dept: GENERAL RADIOLOGY | Facility: CLINIC | Age: 50
End: 2020-03-11
Attending: FAMILY MEDICINE
Payer: COMMERCIAL

## 2020-03-11 ENCOUNTER — OFFICE VISIT (OUTPATIENT)
Dept: FAMILY MEDICINE | Facility: CLINIC | Age: 50
End: 2020-03-11
Payer: COMMERCIAL

## 2020-03-11 VITALS
HEIGHT: 69 IN | RESPIRATION RATE: 22 BRPM | HEART RATE: 97 BPM | OXYGEN SATURATION: 96 % | TEMPERATURE: 99.4 F | DIASTOLIC BLOOD PRESSURE: 83 MMHG | WEIGHT: 219 LBS | BODY MASS INDEX: 32.44 KG/M2 | SYSTOLIC BLOOD PRESSURE: 147 MMHG

## 2020-03-11 DIAGNOSIS — M25.561 ACUTE PAIN OF RIGHT KNEE: ICD-10-CM

## 2020-03-11 DIAGNOSIS — M25.561 ACUTE PAIN OF RIGHT KNEE: Primary | ICD-10-CM

## 2020-03-11 PROCEDURE — 99214 OFFICE O/P EST MOD 30 MIN: CPT | Performed by: FAMILY MEDICINE

## 2020-03-11 PROCEDURE — 73562 X-RAY EXAM OF KNEE 3: CPT | Mod: RT

## 2020-03-11 RX ORDER — HYDROCODONE BITARTRATE AND ACETAMINOPHEN 5; 325 MG/1; MG/1
1 TABLET ORAL EVERY 6 HOURS PRN
Qty: 18 TABLET | Refills: 0 | Status: SHIPPED | OUTPATIENT
Start: 2020-03-11 | End: 2020-03-14

## 2020-03-11 ASSESSMENT — MIFFLIN-ST. JEOR: SCORE: 1684.82

## 2020-03-11 ASSESSMENT — PAIN SCALES - GENERAL: PAINLEVEL: EXTREME PAIN (8)
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
## 2020-03-11 NOTE — PATIENT INSTRUCTIONS
Your provider has referred you to: [facility]: [facility] [phone]   [email or web address]  [facility]: [facility] [phone]    [email or web address]  [facility]:  [facility] [phone]   [email or web address]    Call for an appointment(s) with orthopedic(s)

## 2020-03-19 ENCOUNTER — TELEPHONE (OUTPATIENT)
Dept: PSYCHIATRY | Facility: CLINIC | Age: 50
End: 2020-03-19

## 2020-03-19 NOTE — TELEPHONE ENCOUNTER
Social Work   Incoming Voicemail  [facility]    Incoming Voicemail From:  Zuleyma Reed    Content of Voicemail:    Patient is requesting call back as soon as possible.    Response/Plan:    SW returned call. SW left voicemail with contact information and requesting that she leave good times to contact her if she receives voicemail.      Will route to patient's current psychiatric provider(s) as an FYI.   Please call or EPIC message with any questions or concerns.    TROY Avila, [facility]  [phone]

## 2020-04-22 ENCOUNTER — TELEPHONE (OUTPATIENT)
Dept: ENDOCRINOLOGY | Facility: CLINIC | Age: 50
End: 2020-04-22

## 2020-04-22 NOTE — TELEPHONE ENCOUNTER
1st Attempt Recall Letter mailed on 04/04/2020.    2nd Attempt: I spoke with Zuleyma, but she declined scheduling her September follow up appointment at this time stating that she will call back as it gets closer. Patient is due to be seen by an Endocrinologist on or around Sept 8, 2020. Patient has previously seen Dr Suárez.     Dliip Chapin  Procedure , [place]  Peds Specialty and Adult Endocrinology

## 2020-05-15 ENCOUNTER — VIRTUAL VISIT (OUTPATIENT)
Dept: PSYCHIATRY | Facility: CLINIC | Age: 50
End: 2020-05-15
Attending: NURSE PRACTITIONER
Payer: COMMERCIAL

## 2020-05-15 DIAGNOSIS — F41.9 ANXIETY: ICD-10-CM

## 2020-05-15 DIAGNOSIS — F33.1 MDD (MAJOR DEPRESSIVE DISORDER), RECURRENT EPISODE, MODERATE (H): Primary | ICD-10-CM

## 2020-05-15 RX ORDER — LORAZEPAM 0.5 MG/1
0.5 TABLET ORAL DAILY PRN
Qty: 20 TABLET | Refills: 0 | Status: SHIPPED | OUTPATIENT
Start: 2020-05-15
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
## 2020-05-15 NOTE — PATIENT INSTRUCTIONS
-Restart Prozac 20 mg daily x 3 days, then increase it to 40 mg daily  -May use Ativan 0.5 mg daily as needed for severe anxiety    Your next appointment is scheduled on 6/16 (Tue) at 10:30am.    To access your telemedicine visit:     Open a web browser, like Interview, and type [email or web address]     You will see a box asking you to check in to let Crystal Reece know that you are here.     Type in your name and press Check In. That will let Crystal see you in the virtual waiting room. At your scheduled appointment time, your provider will initiate the visit and connect you.     When your visit is done, you can simply close the browser window.        Please Note:  Ideally, you will connect from a desktop, laptop, or tablet with a WiFi connection. Your computer/tablet must have a camera and microphone. You can use a cell phone, if it has a camera, and if you can connect to WiFi. However, if you connect your phone over a cellular network, it is of lower quality and less reliable    Thank you for coming to the PSYCH[facility]ATRY [facility].    Lab Testing:  **If you had lab testing today and your results are reassuring or normal they will be mailed to you or sent through [employer] within 7 days.   **If the lab tests need quick action we will call you with the results.  The phone number we will call with results is # [phone] (home) . If this is not the best number please call our clinic and change the number.    Medication Refills:  If you need any refills please call your pharmacy and they will contact us. Please allow three business for refill processing.   If you need to  your refill at a new pharmacy, please contact the new pharmacy directly. The new pharmacy will help you get your medications transferred.     Scheduling:  If you have any concerns about today's visit or wish to schedule another appointment please call our office during normal business hours [phone] (8-5:00 M-F)    Contact Us:  Please call  [phone] during business hours (8-5:00 M-F).  If after clinic hours, or on the weekend, please call  [phone].      MENTAL HEALTH CRISIS NUMBERS:  [facility]:   [facility] - [phone]   Crisis Residence [facility] - [phone]   Walk-In Counseling Center [facility] - [phone]   COPE 24/7 [place] Mobile Team for Adults - [[phone]]; Child - [[phone]]     Crisis Connection - [phone]     [facility]:   [facility] - [phone]   Walk-in counseling [facility] - [phone]   Walk-in counseling [facility] - [phone]   Crisis Residence [facility] - [phone]   Urgent Care Adult Mental Health:   --Drop-in, 24/7 crisis line, and [place] Mobile Team [[phone]]    CRISIS TEXT LINE: Text [phone] from anywhere, anytime, any crisis 24/7;    OR SEE [email or web address]     Poison Control Center - [phone]    CHILD: [facility] needs assessment team - [phone]     [facility] - [phone]; or [employer] [facility] - [phone]    If you have a medical emergency please call 911or go to the nearest ER.                    _____________________________________________    Again thank you for choosing [facility]IN[facility] and please let us know how we can best partner with you to improve you and your family's health.  You may be receiving a survey in the mail regarding this appointment. We would love to have your feedback, both positive and negative, so please fill out the survey and return it using the provided envolpe. The survey is done by an external company, so your answers are anonymous.

## 2020-06-16 ENCOUNTER — VIRTUAL VISIT (OUTPATIENT)
Dept: PSYCHIATRY | Facility: CLINIC | Age: 50
End: 2020-06-16
Attending: NURSE PRACTITIONER
Payer: COMMERCIAL

## 2020-06-16 DIAGNOSIS — F33.1 MDD (MAJOR DEPRESSIVE DISORDER), RECURRENT EPISODE, MODERATE (H): ICD-10-CM

## 2020-06-16 DIAGNOSIS — F41.9 ANXIETY: Primary | ICD-10-CM

## 2020-06-16 DIAGNOSIS — F43.10 PTSD (POST-TRAUMATIC STRESS DISORDER): ICD-10-CM

## 2020-06-16 RX ORDER — FLUOXETINE 40 MG/1
40 CAPSULE ORAL DAILY
Qty: 30 CAPSULE | Refills: 1 | Status: SHIPPED | OUTPATIENT
Start: 2020-06-16

## 2020-06-16 ASSESSMENT — PAIN SCALES - GENERAL: PAINLEVEL: MODERATE PAIN (4)

## 2020-06-16 NOTE — PROGRESS NOTES
"VIDEO VISIT  Zuleyma Reed is a 50 year old patient who is being evaluated via a billable video visit.      The patient has been notified of following:   \"This video visit will be conducted via a call between you and your physician/provider. We have found that certain health care needs can be provided without the need for an in-person physical exam. This service lets us provide the care you need with a video conversation. If a prescription is necessary we can send it directly to your pharmacy. If lab work is needed we can place an order for that and you can then stop by our lab to have the test done at a later time. Insurers are generally covering virtual visits as they would in-office visits so billing should not be different than normal.  If for some reason you do get billed incorrectly, you should contact the billing office to correct it and that number is in the AVS .    Patient has given verbal consent for video visit?:  Yes     How would you like to obtain your AVS?:  scenios    Video invitation for patient:  DOXY provider, invite not needed      AVS SmartPhrase [PsychAVS] has been placed in 'Patient Instructions':  Yes     Start Time:  1038         End Time:  1051    Telemedicine Visit: The patient's condition can be safely assessed and treated via synchronous audio and visual telemedicine encounter.      Reason for Telemedicine Visit: Due to COVID 19 pandemic, clinic switching all appointments to telemedicine     Originating Site (Patient Location): Patient's home    Distant Site (Provider Location): Provider Remote Setting    Consent:  The patient/guardian has verbally consented to: the potential risks and benefits of telemedicine (video visit) versus in person care; bill my insurance or make self-payment for services provided; and responsibility for payment of non-covered services.     Mode of Communication:  Video Conference via Doxy.    As the provider I attest to compliance with applicable laws and " regulations related to telemedicine.    Psychiatry Clinic Progress Note                                                                  Patient Name: Zuleyma Reed  YOB: 1970  MRN: [id number]  Date of Service:  6/16/2020  Last Seen:5/15/2020    Zuleyma Reed is a 50 year old female who uses the name Zuleyma and pronoun she.        Zuleyma Reed is a 50 year old year old adult who presents for ongoing psychiatric care.  Zuleyma Reed was last seen on 5/15/2020.     At that time,     Medication:   -Restart Prozac 20 mg daily x 3 days, then increase it to 40 mg daily  -May use Ativan 0.5 mg daily as needed for severe anxiety  OTC Recommendations: none  Lab Orders:  none  Referrals: none  Release of Information: none  Future Treatment Considerations: Per symptoms.   Return for Follow Up: in 4 weeks    Pertinent Background:  Diagnoses include MDD, PTSD and anxiety.  Psych critical item history includes suicide attempt [multiple], mutiple psychotropic trials, trauma hx, psych hosp (3-5) and ECT.      Previous Psychiatric Meds: Celexa, Zoloft, Lexapro, Prozac, Paxil, Wellbutrin (vivid dream, not effective at 150 mg BID), Effexor, Cymbalta, Remeron, Trazodone and Ambien (oversedations with all medications), was prescribed Lithium once, but did not start.      Interim History                                                                                                        4, 4     Since the last visit, reports tolerating Prozac 40 mg, taking the medication at night.  Notes there are occasional anxiety exacerbation without any patterns.  Has been taking Ativan x3-4/week, depending on how anxiety is.  Thinks partly this is situational as she continues to live in a camper at her parents' house and though they are very nice, mother is difficult to deal with.  Denies Si, SIB or HI.  Pt no longer seeing therapist since she lost insurance and previous therapist was in [place] whereas she now lives in  [place].  Unsure where she would be living in the future.    Denies any symptoms suggestive of hypomania or psychosis.    Current Suicidality/Hx of Suicide Attempts: denies currently, hx of SA x 3, most recently 1/2019 by overdose  CoCominent Medical concerns: none    Medication Side Effects: The patient denies all medication side effects.      Medical Review of Systems     Apart from the symptoms mentioned int he HPI, the 14 point review of systems, including constitutional, HEENT, cardiovascular, respiratory, gastrointestinal, genitourinary, musculoskeletal, integumentary, endocrine, neurological, hematologic and allergic is entirely negative.    Pregnant: None. Nursing: None, Contraception: Mirena IUD, not currently sexually active      Substance Use   Pt has been staying substance free since last seen.        Medical / Surgical History                                                                                                                  Patient Active Problem List   Diagnosis     Depression with anxiety     Allergic rhinitis     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     Dysthymic disorder     Family history of autoimmune disorder     Generalized anxiety disorder     IUD (intrauterine device) in place     Major depressive disorder, recurrent episode (H)     Primary localized osteoarthrosis, lower leg     Tension type headache     MDD (major depressive disorder)     Suicidal ideation     Tobacco dependence syndrome       Past Surgical History:   Procedure Laterality Date     ORTHOPEDIC SURGERY          Social/ Family History                                  [per patient report]                                 1ea,1ea   Living arrangements: lives in a camper at her parent's house in [place] and feels safe  Social Support: parents, daughter  Access to gun: denies    Allergy                                Patient has no known allergies.    Current Medications                                       "                                                                 Current Outpatient Medications   Medication Sig Dispense Refill     acetaminophen (TYLENOL) 325 MG tablet Take 975 mg by mouth every 6 hours as needed for mild pain       aspirin-acetaminophen-caffeine (EXCEDRIN MIGRAINE) 250-250-65 MG tablet Take 1 tablet by mouth every 6 hours as needed for headaches       FLUoxetine (PROZAC) 20 MG capsule Take 1 tab daily x 3 days and increase it to 2 tabs daily 60 capsule 1     FLUoxetine (PROZAC) 20 MG capsule Take 20 mg by mouth daily       LORazepam (ATIVAN) 0.5 MG tablet Take 1 tablet (0.5 mg) by mouth daily as needed for anxiety 20 tablet 0        Mental Status Exam                                                                                   9, 14 cog        Alertness: alert  and oriented  Appearance:  Casually dressed and Adequately groomed  Behavior/Demeanor: cooperative and calm, with fair  eye contact   Speech: regular rate and rhythm  Mood :  \"okay\", occasionally anxious  Affect: slightly subdued, mostly full range; was congruent to mood; was congruent to content  Thought Process (Associations):  Linear and Goal directed  Thought process (Rate):  Normal  Thought content:  no overt psychosis, denies suicidal ideation, intent or thoughts and patient does not appear to be responding to internal stimuli  Perception:  Reports none;  Denies depersonalization and derealization  Attention/Concentration:  Normal  Memory:  Immediate recall intact and Short-term memory intact  Language: intact  Fund of Knowledge/Intelligence:  Average  Abstraction:  [place]  Insight:  Fair  Judgment:  Fair  Cognition: (6) does  appear grossly intact; formal cognitive testing was not done    Physical Exam     Motor activity/EPS:  Normal  Gait:  Normal  Psychomotor: normal or unremarkable    Labs and Results      Pertinent findings on review include: Review of records with relevant information reported in the HPI.  Reviewed pt's " past medical record and obtained collateral information.      [place] PRESCRIPTION MONITORING PROGRAM [] was checked today:  indicates no refills since last seen.    PHQ9 Today:  N/A  PHQ 10/4/2019 10/22/2019 12/24/2019   PHQ-9 Total Score 25 26 13   Q9: Thoughts of better off dead/self-harm past 2 weeks More than half the days Nearly every day Not at all   F/U: Thoughts of suicide or self-harm - - -   F/U: Self harm-plan - - -   F/U: Self-harm action - - -   F/U: Safety concerns - - -       FAVIAN 7 Today: N/A  FAVIAN-7 SCORE 7/22/2019 9/6/2019 10/4/2019   Total Score 5 14 13       Recent Labs   Lab Test 03/09/20  2339 10/23/19  0736 06/26/19  0701   CR 0.99 0.85 0.92   GFRESTIMATED 68 81 74     Recent Labs   Lab Test 10/23/19  0736 06/26/19  0701   AST 8 13   ALT 20 21   ALKPHOS 61 63       PSYCHOTROPIC DRUG INTERACTIONS:    no.  MANAGEMENT:  N/A    Impression/Assessment      Zuleyma Reed is a 50 year old adult  who presents for med management follow up.  Pt appears fairly stable in her mood and anxiety, denies SI, SIB Or HI.  Pt noted occasional exacerbation of anxiety, but thinks this is situational to her current living status.  Pt denied any exacerbation of anxiety since restart of Prozac.  Since pt has been on Prozac 40 mg only for 3 weeks, she wants to continue on current dosing for now while monitoring closely on anxiety.  OK to continue on current medication regimen.  Though pt reported she has been using Ativan,  indicates that she has not refilled the medication since last seen.  Will not refill Ativan today.    Also discussed importance of reestablishing therapist.  Since pt is in [place], gave her information on [facility] to establish therapy.      Diagnosis                                                                    MDD moderate  PTSD  Anxiety  R/out personality disorder    Treatment Recommendation & Plan       Medication Ordered/Consults/Labs/tests Ordered:     Medication: Continue on  current medication regimen for now.  Next refill of Prozac is ordered with 40 mg capsule, make sure to read the label please  OTC Recommendations: none  Lab Orders:  none  Referrals: Establish therapist.  Rene Vasquez: [phone]  Release of Information: none  Future Treatment Considerations: Per symptoms.   Return for Follow Up: in 4 weeks    -Discussed safety plan for suicidal thoughts  -Discussed plan for suicidality  -Discussed available emergency services  -Patient agrees with the treatment plan  -Encouraged to continue outpatient therapy to gain more coping mechanism for stress.      Treatment Risk Statement: Discussed with the patient my impressions, as well as recommended studies. I educated patient on the differential diagnosis and prognosis. I discussed with the patient the risks and benefits of medications versus no interventions, including efficacy, dose, possible side effects and length of treatment and the importance of medication compliance.  The patient understands the risks, benefits, adverse effects and alternatives. Agrees to treatment with the capacity to do so. No medical contraindications to treatment. The patient also understands the risks of using street drugs or alcohol.     CRISIS NUMBERS:   Provided routinely in AVS.      Crystal Reece CNP,  2020

## 2020-06-16 NOTE — PATIENT INSTRUCTIONS
-Continue on current medication regimen for now.  Next refill of Prozac is ordered with 40 mg capsule, make sure to read the label please  -Establish therapist.  Rene Vasquez: [phone]    Your next appointment is scheduled on  (Tue) 10:30am    To access your telemedicine visit:     Open a web browser, like Boomi, and type [email or web address]     You will see a box asking you to check in to let Crystal Reece know that you are here.     Type in your name and press Check In. That will let Crystal see you in the virtual waiting room. At your scheduled appointment time, your provider will initiate the visit and connect you.     When your visit is done, you can simply close the browser window.        Please Note:  Ideally, you will connect from a desktop, laptop, or tablet with a WiFi connection. Your computer/tablet must have a camera and microphone. You can use a cell phone, if it has a camera, and if you can connect to WiFi. However, if you connect your phone over a cellular network, it is of lower quality and less reliable      Thank you for coming to the PSYCHIATRY CLINIC.    Lab Testing:  If you had lab testing today and your results are reassuring or normal they will be mailed to you or sent through [employer] within 7 days. If the lab tests need quick action we will call you with the results. The phone number we will call with results is # [phone] (home) . If this is not the best number please call our clinic and change the number.    Medication Refills:  If you need any refills please call your pharmacy and they will contact us. Our fax number for refills is [phone]. Please allow three business for refill processing. If you need to  your refill at a new pharmacy, please contact the new pharmacy directly. The new pharmacy will help you get your medications transferred.     Scheduling:  If you have any concerns about today's visit or wish to schedule another appointment please call  our office during normal business hours [phone] (8-5:00 M-F)    Contact Us:  Please call [phone] during business hours (8-5:00 M-F).  If after clinic hours, or on the weekend, please call  [phone].    Financial Assistance [phone]  [facility] Billing [phone]  [place] Billing Office, [facility]: [phone]  [facility] [facility] Billing [phone]  Medical Records [phone]      MENTAL HEALTH CRISIS NUMBERS:  For a medical emergency please call  911 or go to the nearest ER.     [facility]:   [facility] -[phone]   Crisis Residence [facility] -[phone]   Walk-In Counseling Center [facility] -[phone]   COPE 24/7 [place] Mobile Team -[phone] (adults)/[phone] (child)  CHILD: [facility] needs assessment team - [phone]      [facility]:   [facility] - [phone]   Walk-in counseling [facility] - [phone]   Walk-in counseling [facility] - [phone]   Crisis Residence [facility] - [phone]  Urgent Care Adult Mental [facility]-[phone] mobile unit/ 24/7 crisis line    National Crisis Numbers:   National Suicide Prevention Lifeline: 4-064-872-TALK (125-309-2163)  Poison Control Center - 1-181.293.2995  Lindsey Shell.[employer]/resources for a list of additional resources (SOS)  Trans Lifeline a hotline for transgender people 1-186.897.9967  The [employer] a hotline for LGBT youth 1-933.174.1489  Crisis Text Line: For any crisis 24/7   To: 453357  see www.crisistextline.org  - IF MAKING A CALL FEELS TOO HARD, send a text!         Again thank you for choosing PSYCHIATRY CLINIC and please let us know how we can best partner with you to improve you and your family's health.    You may be receiving a survey regarding this appointment. We would love to have your feedback, both positive and negative. The survey is done by an external company, so your answers are anonymous.

## 2020-06-24 NOTE — TELEPHONE ENCOUNTER
3rd Attempt Letter sent.     Dilip Chapin  Procedure , [facility]

## 2020-07-14 ENCOUNTER — DOCUMENTATION ONLY (OUTPATIENT)
Dept: PSYCHIATRY | Facility: CLINIC | Age: 50
End: 2020-07-14

## 2020-07-14 ENCOUNTER — TELEPHONE (OUTPATIENT)
Dept: PSYCHIATRY | Facility: CLINIC | Age: 50
End: 2020-07-14

## 2020-07-14 NOTE — PROGRESS NOTES
Pt did not show up for [facility], did not respond to Doxy invite to email nor phone.  Pt needs to reschedule. Crystal Reece, CNP, 7/14/2020

## 2020-07-14 NOTE — TELEPHONE ENCOUNTER
On 7/14/2020, at 10:15, writer called patient at [phone] to confirm Virtual Visit. Writer unable to make contact with patient. Writer left detailed voice message for call back. [phone] left as call back number. Nargis Dunbar, [facility]

## 2020-11-16 ENCOUNTER — HEALTH MAINTENANCE LETTER (OUTPATIENT)
Age: 50
End: 2020-11-16

## 2021-03-17 ENCOUNTER — TELEPHONE (OUTPATIENT)
Dept: PSYCHIATRY | Facility: CLINIC | Age: 51
End: 2021-03-17

## 2021-04-08 NOTE — TELEPHONE ENCOUNTER
Writer received notification of immunization records from [facility] stating that the patient has received the Covid-19 vaccine on 03/15/2021 from the  [employer]; Lot # [id number]; EXP: 07/31/2021. 30mcg/0.3 ml, given 0.3 ml, IM/RD. This form was sent to scanning. Alice Ribeiro, CMA

## 2021-04-12 ENCOUNTER — TELEPHONE (OUTPATIENT)
Dept: FAMILY MEDICINE | Facility: CLINIC | Age: 51
End: 2021-04-12

## 2021-04-12 NOTE — TELEPHONE ENCOUNTER
Patient Quality Outreach      Summary:    Patient has the following on her problem list/HM:     Depression / Dysthymia review    6 Month Remission: 4-8 month window range:   12 Month Remission: 10-14 month window range:        PHQ-9 SCORE 9/6/2019 9/19/2019 10/4/2019   PHQ-9 Total Score MyChart - - -   PHQ-9 Total Score 19 22 25   Some encounter information is confidential and restricted. Go to Review Flowsheets activity to see all data.       If PHQ-9 recheck is 5 or more, route to provider for next steps.    Patient is due/failing the following:   PHQ-9 Needed    Type of outreach:    Sent [employer] message.    Questions for provider review:    None                                                                                                                                     Cheli Miller

## 2021-04-19 ENCOUNTER — E-VISIT (OUTPATIENT)
Dept: URGENT CARE | Facility: URGENT CARE | Age: 51
End: 2021-04-19
Payer: COMMERCIAL

## 2021-04-19 DIAGNOSIS — Z20.822 SUSPECTED COVID-19 VIRUS INFECTION: Primary | ICD-10-CM

## 2021-04-19 PROCEDURE — 99421 OL DIG E/M SVC 5-10 MIN: CPT | Performed by: FAMILY MEDICINE

## 2021-04-19 NOTE — PATIENT INSTRUCTIONS
Dear Zuleyma Reed,    Your symptoms show that you may have coronavirus (COVID-19). This illness can cause fever, cough and trouble breathing. Many people get a mild case and get better on their own. Some people can get very sick.    Will I be tested for COVID-19?  We would like to test you for Covid-19 virus. I have placed orders for this test.     To schedule: go to your eEvent home page and scroll down to the section that says  You have an appointment that needs to be scheduled  and click the large green button that says  Schedule Now  and follow the steps to find the next available openings.    If you are unable to complete these eEvent scheduling steps, please call [phone] to schedule your testing.     Return to work/school/ guidance:  Please let your workplace manager and staffing office know when your quarantine ends     We can t give you an exact date as it depends on the above. You can calculate this on your own or work with your manager/staffing office to calculate this. (For example if you were exposed on 10/4, you would have to quarantine for 14 full days. That would be through 10/18. You could return on 10/19.)      If you receive a positive COVID-19 test result, follow the guidance of the those who are giving you the results. Usually the return to work is 10 (or in some cases 20 days from symptom onset.) If you work at [facility], you must also be cleared by Employee Occupational Health and Safety to return to work.        If you receive a negative COVID-19 test result and did not have a high risk exposure to someone with a known positive COVID-19 test, you can return to work once you're free of fever for 24 hours without fever-reducing medication and your symptoms are improving or resolved.      If you receive a negative COVID-19 test and If you had a high risk exposure to someone who has tested positive for COVID-19 then you can return to work 14 days after your last contact  with the positive individual    Note: If you have ongoing exposure to the covid positive person, this quarantine period may be more than 14 days. (For example, if you are continued to be exposed to your child who tested positive and cannot isolate from them, then the quarantine of 7-14 days can't start until your child is no longer contagious. This is typically 10 days from onset of the child's symptoms. So the total duration may be 17-24 days in this case.)    Sign up for [employer].   We know it's scary to hear that you might have COVID-19. We want to track your symptoms to make sure you're okay over the next 2 weeks. Please look for an email from [employer]--this is a free, online program that we'll use to keep in touch. To sign up, follow the link in the email you will receive. Learn more at [email or web address]    How can I take care of myself?    Get lots of rest. Drink extra fluids (unless a doctor has told you not to)    Take Tylenol (acetaminophen) or ibuprofen for fever or pain. If you have liver or kidney problems, ask your family doctor if it's okay to take Tylenol o ibuprofen    If you have other health problems (like cancer, heart failure, an organ transplant or severe kidney disease): Call your specialty clinic if you don't feel better in the next 2 days.    Know when to call 911. Emergency warning signs include:  o Trouble breathing or shortness of breath  o Pain or pressure in the chest that doesn't go away  o Feeling confused like you haven't felt before, or not being able to wake up  o Bluish-colored lips or face    Where can I get more information?   [employer] [place] - About COVID-19:   [email or web address]    CDC - What to Do If You're Sick:   www.cdc.gov/coronavirus/2019-ncov/about/steps-when-sick.html    April 19, 2021  RE:  Zuleyma Reed                                                                                                                  [id number]  [facility] [place] [address]      To whom it may concern:    I evaluated Zuleyma Reed on April 19, 2021. Zuleyma Reed should be excused from work/school.     They should let their workplace manager and staffing office know when their quarantine ends.    We can not give an exact date as it depends on the information below. They can calculate this on their own or work with their manager/staffing office to calculate this. (For example if they were exposed on 10/04, they would have to quarantine for 14 full days. That would be through 10/18. They could return on 10/19.)    Quarantine Guidelines:      If patient receives a positive COVID-19 test result, they should follow the guidance of those who are giving the results. Usually the return to work is 10 (or in some cases 20 days from symptom onset.) If they work at [employer], they must be cleared by Employee Occupational Health and Safety to return to work.        If patient receives a negative COVID-19 test result and did not have a high risk exposure to someone with a known positive COVID-19 test, they can return to work once they're free of fever for 24 hours without fever-reducing medication and their symptoms are improving or resolved.      If patient receives a negative COVID-19 test and if they had a high risk exposure to someone who has tested positive for COVID-19 then they can return to work 14 days after their last contact with the positive individual    Note: If there is ongoing exposure to the covid positive person, this quarantine period may be longer than 14 days. (For example, if they are continually exposed to their child, who tested positive and cannot isolate from them, then the quarantine of 7-14 days can't start until their child is no longer contagious. This is typically 10 days from onset to the child's symptoms. So the total duration may be 17-24 days in this case.)     Sincerely,  Krish Easton, DO

## 2021-09-12 ENCOUNTER — HEALTH MAINTENANCE LETTER (OUTPATIENT)
Age: 51
End: 2021-09-12

## 2022-01-02 ENCOUNTER — HEALTH MAINTENANCE LETTER (OUTPATIENT)
Age: 52
End: 2022-01-02

## 2022-05-11 NOTE — DISCHARGE INSTRUCTIONS
ECT Discharge Instructions      During your ECT series:      Do not drive or work heavy equipment until 7 days after your last treatment.    Do not drink alcohol or use street drugs (illicit drugs) while you are having treatments.    Do not make important decisions, including legal decisions.    After each treatment:      Get plenty of rest. A responsible adult must stay with your for at least 6 hours.    Avoid heavy or risky activities for 24 hours.    If you feel light-headed, sit for a few minutes before standing. Have someone help you get up.    If you have nausea (feel sick to your stomach): Drink only clear liquids such as apple juice, ginger ale, broth or 7UP, Be sure to drink plenty of liquids. Move to a normal diet as you feel able.     If you received Toradol, wait 6 hours before taking ibuprofen.    Call your doctor if:     You have a fever over 100F (37.7 C) (taken under the tongue), or a fever that last more than 24 hours.    Your IV site is red, swollen, very painful or is getting more tender.    You have nausea that gets very bad or does not improve.      If you have any symptoms after ECT, tell our staff before your next treatment.    The ECT Department can be reached at [phone].  The ECT Department is open Mondays, Wednesdays and Fridays from 7:00 AM to 2:00 PM.        To speak to a doctor, call: Dr. [facility]#[phone] office #[phone] Fax #[phone]    Other: Patient advised to keep a journal or serial video record of her progress through outpt. ECT. Encouraged more contact with family  Monitor depression severity with clinical assessment augmented with CUDOS every other treatment    Transported by: Nicholas Gonzalez     no

## 2022-11-19 ENCOUNTER — HEALTH MAINTENANCE LETTER (OUTPATIENT)
Age: 52
End: 2022-11-19

## 2023-04-09 ENCOUNTER — HEALTH MAINTENANCE LETTER (OUTPATIENT)
Age: 53
End: 2023-04-09

## 2023-04-17 NOTE — PROGRESS NOTES
10/29/19 1500   Occupational Therapy   Type of Intervention structured groups   Response Initiates, socially acceptable   Hours 2     Multi-step creative expressions craft for increased motivation, coping with symptoms, and socialization with peers. Pt Response: Pleasant and highly engaged. I to initiate, make decisions, sequence and adjust to workspace demands as needed. Utilized verbal and visual instructions for direction. Demonstrated great focus, planning, frustration tolerance, and problem solving. Reported overall experience as enjoyable.      Statement Selected

## 2023-06-01 ENCOUNTER — HEALTH MAINTENANCE LETTER (OUTPATIENT)
Age: 53
End: 2023-06-01

## 2024-03-22 NOTE — ADDENDUM NOTE
Addended by: SARAH RUDD on: 11/27/2019 08:27 AM     Modules accepted: Orders    
Detail Level: Generalized
Detail Level: Detailed

## 2024-06-16 ENCOUNTER — HEALTH MAINTENANCE LETTER (OUTPATIENT)
Age: 54
End: 2024-06-16

## 2024-10-01 NOTE — PROCEDURES
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 [phone] and ask to speak to Pily Gardner RN who will direct any questions to Dr. Newton.    For further  information about ECT, go to these websites:   [email or web address] de [email or web address]    https://www.youtube.com/watch?v=CYqo9ai9ksr    https://www.youtube.com/watch?v=IgtK68IB3Sq    https://www.isen-ect.org/educational-content  http://www.[facility].org/tests-procedures/electroconvulsive-therapy/basics/definition/[facility]-[id number]      Start using a calendar and notebook or apps on your smartphone to keep track of appointments, conversations,  and other things you need to remember, as this will be more helpful as the ECT continues.     Start an ECT journal to track your progress. Spend a few minutes writing down how you feel now before ECT and why you are doing ECT. Throughout the course of treatments (probably this will be easier on non-ECT days), write what changes you are noticing in your depression, daily activities or side effects of treatment. This can be useful later on in the ECT if you are having some memory loss day-to-day and can't recall how you used to feel. If you have trouble writing this, try recording a video on non-ECT days.describing the above.    Eat healthily, keep a regular sleep schedule, exercise or other physical activity at least on non-ECT days as able.     Keep mentally active by reading, doing crossword puzzles or other word games, play video or other games. You can improve your memory for events happening over the previous few days by regularly reviewing things that happened over that period of time which will refresh your memory.       Ramy Newton MD   of Psychiatry

## (undated) RX ORDER — METHOHEXITAL IN WATER/PF 100MG/10ML
SYRINGE (ML) INTRAVENOUS
Status: DISPENSED
Start: 2019-11-04

## (undated) RX ORDER — METHOHEXITAL IN WATER/PF 100MG/10ML
SYRINGE (ML) INTRAVENOUS
Status: DISPENSED
Start: 2019-11-18

## (undated) RX ORDER — KETOROLAC TROMETHAMINE 30 MG/ML
INJECTION, SOLUTION INTRAMUSCULAR; INTRAVENOUS
Status: DISPENSED
Start: 2019-11-04

## (undated) RX ORDER — METHOHEXITAL IN WATER/PF 100MG/10ML
SYRINGE (ML) INTRAVENOUS
Status: DISPENSED
Start: 2019-10-28

## (undated) RX ORDER — KETOROLAC TROMETHAMINE 30 MG/ML
INJECTION, SOLUTION INTRAMUSCULAR; INTRAVENOUS
Status: DISPENSED
Start: 2019-11-18

## (undated) RX ORDER — METHOHEXITAL IN WATER/PF 100MG/10ML
SYRINGE (ML) INTRAVENOUS
Status: DISPENSED
Start: 2019-10-25

## (undated) RX ORDER — KETOROLAC TROMETHAMINE 30 MG/ML
INJECTION, SOLUTION INTRAMUSCULAR; INTRAVENOUS
Status: DISPENSED
Start: 2019-11-27

## (undated) RX ORDER — METHOHEXITAL IN WATER/PF 100MG/10ML
SYRINGE (ML) INTRAVENOUS
Status: DISPENSED
Start: 2019-11-01

## (undated) RX ORDER — METHOHEXITAL IN WATER/PF 100MG/10ML
SYRINGE (ML) INTRAVENOUS
Status: DISPENSED
Start: 2019-11-11

## (undated) RX ORDER — METHOHEXITAL IN WATER/PF 100MG/10ML
SYRINGE (ML) INTRAVENOUS
Status: DISPENSED
Start: 2019-11-27

## (undated) RX ORDER — METHOHEXITAL IN WATER/PF 100MG/10ML
SYRINGE (ML) INTRAVENOUS
Status: DISPENSED
Start: 2019-11-25

## (undated) RX ORDER — METHOHEXITAL IN WATER/PF 100MG/10ML
SYRINGE (ML) INTRAVENOUS
Status: DISPENSED
Start: 2019-12-02

## (undated) RX ORDER — LABETALOL 20 MG/4 ML (5 MG/ML) INTRAVENOUS SYRINGE
Status: DISPENSED
Start: 2019-11-06

## (undated) RX ORDER — KETOROLAC TROMETHAMINE 30 MG/ML
INJECTION, SOLUTION INTRAMUSCULAR; INTRAVENOUS
Status: DISPENSED
Start: 2019-12-02

## (undated) RX ORDER — KETOROLAC TROMETHAMINE 30 MG/ML
INJECTION, SOLUTION INTRAMUSCULAR; INTRAVENOUS
Status: DISPENSED
Start: 2019-11-25

## (undated) RX ORDER — KETOROLAC TROMETHAMINE 30 MG/ML
INJECTION, SOLUTION INTRAMUSCULAR; INTRAVENOUS
Status: DISPENSED
Start: 2019-12-04

## (undated) RX ORDER — GLYCOPYRROLATE 0.2 MG/ML
INJECTION INTRAMUSCULAR; INTRAVENOUS
Status: DISPENSED
Start: 2019-10-25

## (undated) RX ORDER — HYDRALAZINE HYDROCHLORIDE 20 MG/ML
INJECTION INTRAMUSCULAR; INTRAVENOUS
Status: DISPENSED
Start: 2019-11-06

## (undated) RX ORDER — METHOHEXITAL IN WATER/PF 100MG/10ML
SYRINGE (ML) INTRAVENOUS
Status: DISPENSED
Start: 2019-12-04

## (undated) RX ORDER — KETOROLAC TROMETHAMINE 30 MG/ML
INJECTION, SOLUTION INTRAMUSCULAR; INTRAVENOUS
Status: DISPENSED
Start: 2019-11-11

## (undated) RX ORDER — KETOROLAC TROMETHAMINE 30 MG/ML
INJECTION, SOLUTION INTRAMUSCULAR; INTRAVENOUS
Status: DISPENSED
Start: 2019-11-06

## (undated) RX ORDER — METHOHEXITAL IN WATER/PF 100MG/10ML
SYRINGE (ML) INTRAVENOUS
Status: DISPENSED
Start: 2019-10-30

## (undated) RX ORDER — METHOHEXITAL IN WATER/PF 100MG/10ML
SYRINGE (ML) INTRAVENOUS
Status: DISPENSED
Start: 2019-11-08

## (undated) RX ORDER — METHOHEXITAL IN WATER/PF 100MG/10ML
SYRINGE (ML) INTRAVENOUS
Status: DISPENSED
Start: 2019-11-06

## (undated) RX ORDER — KETOROLAC TROMETHAMINE 30 MG/ML
INJECTION, SOLUTION INTRAMUSCULAR; INTRAVENOUS
Status: DISPENSED
Start: 2019-11-22

## (undated) RX ORDER — LABETALOL 20 MG/4 ML (5 MG/ML) INTRAVENOUS SYRINGE
Status: DISPENSED
Start: 2019-11-27

## (undated) RX ORDER — METHOHEXITAL IN WATER/PF 100MG/10ML
SYRINGE (ML) INTRAVENOUS
Status: DISPENSED
Start: 2019-11-22

## (undated) RX ORDER — KETOROLAC TROMETHAMINE 30 MG/ML
INJECTION, SOLUTION INTRAMUSCULAR; INTRAVENOUS
Status: DISPENSED
Start: 2019-11-08